# Patient Record
Sex: MALE | Race: ASIAN | NOT HISPANIC OR LATINO | Employment: FULL TIME | URBAN - METROPOLITAN AREA
[De-identification: names, ages, dates, MRNs, and addresses within clinical notes are randomized per-mention and may not be internally consistent; named-entity substitution may affect disease eponyms.]

---

## 2018-04-30 ENCOUNTER — OFFICE VISIT (OUTPATIENT)
Dept: FAMILY MEDICINE | Facility: CLINIC | Age: 28
End: 2018-04-30
Payer: COMMERCIAL

## 2018-04-30 VITALS
SYSTOLIC BLOOD PRESSURE: 94 MMHG | DIASTOLIC BLOOD PRESSURE: 62 MMHG | OXYGEN SATURATION: 100 % | BODY MASS INDEX: 27 KG/M2 | WEIGHT: 175 LBS | HEART RATE: 63 BPM | TEMPERATURE: 98.3 F

## 2018-04-30 DIAGNOSIS — J45.20 MILD INTERMITTENT ASTHMA WITHOUT COMPLICATION: ICD-10-CM

## 2018-04-30 DIAGNOSIS — F90.0 ATTENTION DEFICIT HYPERACTIVITY DISORDER (ADHD), PREDOMINANTLY INATTENTIVE TYPE: Primary | ICD-10-CM

## 2018-04-30 PROCEDURE — 99214 OFFICE O/P EST MOD 30 MIN: CPT | Performed by: FAMILY MEDICINE

## 2018-04-30 RX ORDER — DEXTROAMPHETAMINE SACCHARATE, AMPHETAMINE ASPARTATE, DEXTROAMPHETAMINE SULFATE AND AMPHETAMINE SULFATE 5; 5; 5; 5 MG/1; MG/1; MG/1; MG/1
20 TABLET ORAL 2 TIMES DAILY
Qty: 60 TABLET | Refills: 0 | Status: SHIPPED | OUTPATIENT
Start: 2018-05-30 | End: 2018-04-30

## 2018-04-30 RX ORDER — ALBUTEROL SULFATE 90 UG/1
1 AEROSOL, METERED RESPIRATORY (INHALATION) PRN
COMMUNITY
Start: 2017-10-20 | End: 2018-04-30

## 2018-04-30 RX ORDER — DEXTROAMPHETAMINE SACCHARATE, AMPHETAMINE ASPARTATE, DEXTROAMPHETAMINE SULFATE AND AMPHETAMINE SULFATE 5; 5; 5; 5 MG/1; MG/1; MG/1; MG/1
20 TABLET ORAL 2 TIMES DAILY
Qty: 60 TABLET | Refills: 0 | Status: SHIPPED | OUTPATIENT
Start: 2018-04-30 | End: 2018-04-30

## 2018-04-30 RX ORDER — DEXTROAMPHETAMINE SACCHARATE, AMPHETAMINE ASPARTATE, DEXTROAMPHETAMINE SULFATE AND AMPHETAMINE SULFATE 5; 5; 5; 5 MG/1; MG/1; MG/1; MG/1
20 TABLET ORAL 2 TIMES DAILY
Qty: 60 TABLET | Refills: 0 | Status: SHIPPED | OUTPATIENT
Start: 2018-06-30 | End: 2018-07-30

## 2018-04-30 RX ORDER — ALBUTEROL SULFATE 90 UG/1
1 AEROSOL, METERED RESPIRATORY (INHALATION) PRN
Qty: 18 G | Refills: 3 | Status: SHIPPED | OUTPATIENT
Start: 2018-04-30 | End: 2018-10-10

## 2018-04-30 NOTE — NURSING NOTE
"Chief Complaint   Patient presents with     Recheck Medication       Initial BP 94/62 (Cuff Size: Adult Large)  Pulse 63  Temp 98.3  F (36.8  C) (Tympanic)  Wt 175 lb (79.4 kg)  SpO2 100%  BMI 27 kg/m2 Estimated body mass index is 27 kg/(m^2) as calculated from the following:    Height as of 1/8/13: 5' 7.5\" (1.715 m).    Weight as of this encounter: 175 lb (79.4 kg).  Medication Reconciliation: complete   .Dee GARCIA      "

## 2018-04-30 NOTE — MR AVS SNAPSHOT
After Visit Summary   4/30/2018    Cinda Peterson    MRN: [id number]           Patient Information     Date Of Birth          1990        Visit Information        Provider Department      4/30/2018 4:30 PM Kavin Monaco MD [facility]        Today's Diagnoses     Attention deficit hyperactivity disorder (ADHD), predominantly inattentive type    -  1    Mild intermittent asthma without complication           Follow-ups after your visit        Who to contact     If you have questions or need follow up information about today's clinic visit or your schedule please contact [facility] directly at [phone].  Normal or non-critical lab and imaging results will be communicated to you by MyChart, letter or phone within 4 business days after the clinic has received the results. If you do not hear from us within 7 days, please contact the clinic through [employer] or phone. If you have a critical or abnormal lab result, we will notify you by phone as soon as possible.  Submit refill requests through [employer] or call your pharmacy and they will forward the refill request to us. Please allow 3 business days for your refill to be completed.          Additional Information About Your Visit        MyChart Information     [employer] gives you secure access to your electronic health record. If you see a primary care provider, you can also send messages to your care team and make appointments. If you have questions, please call your primary care clinic.  If you do not have a primary care provider, please call [phone] and they will assist you.        Care EveryWhere ID     This is your Care EveryWhere ID. This could be used by other organizations to access your [place] medical records  [id number]        Your Vitals Were     Pulse Temperature Pulse Oximetry BMI (Body Mass Index)          63 98.3  F (36.8  C) (Tympanic) 100% 27 kg/m2          Blood Pressure from Last 3 Encounters:   04/30/18 94/62   01/08/13 96/54   08/17/12 120/60    Weight from Last 3 Encounters:   04/30/18 175 lb (79.4 kg)   01/08/13 175 lb 6.4 oz (79.6 kg)   08/17/12 157 lb (71.2 kg)              Today, you had the following     No orders found for display         Today's Medication Changes          These changes are accurate as of 4/30/18  4:37 PM.  If you have any questions, ask your nurse or doctor.               Start taking these medicines.        Dose/Directions    amphetamine-dextroamphetamine 20 MG per tablet   Commonly known as:  ADDERALL   Used for:  Attention deficit hyperactivity disorder (ADHD), predominantly inattentive type   Started by:  Kavin Monaco MD        Dose:  20 mg   Start taking on:  6/30/2018   Take 1 tablet (20 mg) by mouth 2 times daily   Quantity:  60 tablet   Refills:  0            Where to get your medicines      These medications were sent to [facility] [place] [id number]  [address] [facility] [address]     Phone:  [phone]     albuterol 108 (90 Base) MCG/ACT Inhaler         Some of these will need a paper prescription and others can be bought over the counter.  Ask your nurse if you have questions.     Bring a paper prescription for each of these medications     amphetamine-dextroamphetamine 20 MG per tablet                Primary Care Provider Office Phone # Fax #    Brianna HAIRSTON Wali Spaulding [phone]       [facility] [address] [place] [address]        Equal Access to Services     [facility] AH: Hadii aad ku hadasho Soomaali, waaxda luqadaha, qaybta kaalmada adeegyada, norma reyes haybianca farr. So [facility] [phone].    ATENCIÓN: Si habla español, tiene a allen disposición servicios gratuitos de asistencia lingüística. Llame al [phone].    We comply with applicable federal civil rights laws and [place] laws. We do not  discriminate on the basis of race, color, national origin, age, disability, sex, sexual orientation, or gender identity.            Thank you!     Thank you for choosing [facility]  for your care. Our goal is always to provide you with excellent care. Hearing back from our patients is one way we can continue to improve our services. Please take a few minutes to complete the written survey that you may receive in the mail after your visit with us. Thank you!             Your Updated Medication List - Protect others around you: Learn how to safely use, store and throw away your medicines at www.disposemymeds.org.          This list is accurate as of 4/30/18  4:37 PM.  Always use your most recent med list.                   Brand Name Dispense Instructions for use Diagnosis    albuterol 108 (90 Base) MCG/ACT Inhaler    VENTOLIN HFA    18 g    Inhale 1 puff into the lungs as needed    Mild intermittent asthma without complication       amphetamine-dextroamphetamine 20 MG per tablet   Start taking on:  6/30/2018    ADDERALL    60 tablet    Take 1 tablet (20 mg) by mouth 2 times daily    Attention deficit hyperactivity disorder (ADHD), predominantly inattentive type       fish oil-omega-3 fatty acids 1000 MG capsule      Take 3 g by mouth daily.        vitamin D 2000 units Caps      Take  by mouth 2 times daily.

## 2018-04-30 NOTE — PROGRESS NOTES
SUBJECTIVE:   Cinda Peterson is a 27 year old male who presents to clinic today for the following health issues:    Pt had been living in [place], [place] working for [employer].  He moved back to [place] in March now working for  [employer]     Medication Followup of adderall    Taking Medication as prescribed: yes    Side Effects:  None    Medication Helping Symptoms:  yes   Pt has been taking Adderall 20 mg twice daily, works well for him without side effects        Attention Deficit Disorder      Duration: since teenager     Description (location/character/radiation): primarily inattentive type    Intensity:  moderate    Accompanying signs and symptoms: hard to keep on track, focus     History (similar episodes/previous evaluation): has been ongoing    Precipitating or alleviating factors: None    Therapies tried and outcome: Adderall 20 mg BID works well     Asthma Follow-Up    Was ACT completed today?  No      Respiratory symptoms:   Cough: No   Wheezing: Yes-  intermittent   Shortness of breath: No    Use of short- acting(rescue) inhaler: Albuterol    Taking controlled (daily) meds as prescribed: No    ER/UC visits or hospital admissions since last visit: none     Recent asthma triggers that patient is dealing with: animal dander, found out that previous renter had a cat.  Pt has Hx of allergy to cats       Problem list and histories reviewed & adjusted, as indicated.  Additional history: as documented    Labs reviewed in EPIC    Reviewed and updated as needed this visit by clinical staff  Tobacco  Allergies  Meds  Problems  Med Hx  Surg Hx  Fam Hx  Soc Hx        Reviewed and updated as needed this visit by Provider  Allergies  Meds  Problems         ROS:  CONSTITUTIONAL:NEGATIVE for fever, chills, change in weight  ENT/MOUTH: NEGATIVE for ear, mouth and throat problems  RESP:POSITIVE for Hx asthma and wheezing  PSYCHIATRIC: NEGATIVE for changes in mood or affect and  POSITIVE forconcentration difficulty    OBJECTIVE:                                                    BP 94/62 (Cuff Size: Adult Large)  Pulse 63  Temp 98.3  F (36.8  C) (Tympanic)  Wt 175 lb (79.4 kg)  SpO2 100%  BMI 27 kg/m2  Body mass index is 27 kg/(m^2).  GENERAL APPEARANCE: healthy, alert and no distress  HENT: ear canals and TM's normal and nose and mouth without ulcers or lesions  RESP: lungs clear to auscultation - no rales, rhonchi or wheezes  CV: regular rates and rhythm, normal S1 S2, no S3 or S4 and no murmur, click or rub  PSYCH: mentation appears normal and affect normal/bright    Diagnostic test results:  none      ASSESSMENT/PLAN:                                                        ICD-10-CM    1. Attention deficit hyperactivity disorder (ADHD), predominantly inattentive type F90.0 amphetamine-dextroamphetamine (ADDERALL) 20 MG per tablet     DISCONTINUED: amphetamine-dextroamphetamine (ADDERALL) 20 MG per tablet     DISCONTINUED: amphetamine-dextroamphetamine (ADDERALL) 20 MG per tablet   2. Mild intermittent asthma without complication J45.20 albuterol (VENTOLIN HFA) 108 (90 Base) MCG/ACT Inhaler       Follow up with Provider - 3 mo   Rx for 3 sequential months of Adderall given to Pt.  1st month dated today, 2nd dated 5/30/18 and the 3rd dated 6/30/18     Patient Instructions   Advised to have apartment professionally cleaned to help get rid of the cat dander and hair      Kavin Monaco MD  [facility]

## 2018-05-03 ENCOUNTER — TELEPHONE (OUTPATIENT)
Dept: FAMILY MEDICINE | Facility: CLINIC | Age: 28
End: 2018-05-03

## 2018-05-03 NOTE — TELEPHONE ENCOUNTER
Patient called today.    Patient needs a prior authorization on medication Adderall.    Please contact patient.    Thank you.    Central Scheduling  Anna RAHMAN

## 2018-05-03 NOTE — TELEPHONE ENCOUNTER
Central Prior Authorization Team   Phone: [phone]    PA Initiation    Medication: Amphetamine-dextroamphetamine 20 mg  Insurance Company: [employer] - Phone [phone] Fax [phone]  Pharmacy Filling the Rx: [place], [place] - [address]  Filling Pharmacy Phone: [phone]  Filling Pharmacy Fax:    Start Date: 5/3/2018

## 2018-05-03 NOTE — TELEPHONE ENCOUNTER
Prior Authorization Retail Medication Request    Medication/Dose: Amphetamine-dextroamphetamine 20 mg  ICD code (if different than what is on RX):    Previously Tried and Failed:    Rationale:      Insurance Name:  Preferred [employer]clear [employer]  Insurance ID:  [id number]     Pharmacy Information (if different than what is on RX)  Name:    Phone:

## 2018-05-04 NOTE — TELEPHONE ENCOUNTER
Central Prior Authorization Team   Phone: [phone]    Answered additional questions for insurance and faxed back.

## 2018-05-07 NOTE — TELEPHONE ENCOUNTER
Prior Authorization Approval    Authorization Effective Date:  05/04/2018   Authorization Expiration Date:  05/04/2019  Medication: Amphetamine-dextroamphetamine 20 mg  Approved Dose/Quantity:    Reference #:     Insurance Company: [employer] - Phone [phone] Fax [phone]  Expected CoPay:       CoPay Card Available:      Foundation Assistance Needed:    Which Pharmacy is filling the prescription (Not needed for infusion/clinic administered): [facility] [place], [place] - [address]  Pharmacy Notified: Yes  Patient Notified: Yes

## 2018-05-23 ENCOUNTER — TELEPHONE (OUTPATIENT)
Dept: FAMILY MEDICINE | Facility: CLINIC | Age: 28
End: 2018-05-23

## 2018-05-23 NOTE — TELEPHONE ENCOUNTER
Panel Management Review      Patient has the following on his problem list:   Asthma review   No flowsheet data found.   1. Is Asthma diagnosis on the Problem List? Yes    2. Is Asthma listed on Health Maintenance? Yes    3. Patient is due for:  ACT and AAP      Composite cancer screening  Chart review shows that this patient is due/due soon for the following None  Summary:    Patient is due/failing the following:   AAP and ACT    Action needed:   Patient needs office visit for asthma.    Type of outreach:    Sent letter.    Questions for provider review:    None                                                                                                                                    Ellie Gan CMA

## 2018-05-23 NOTE — LETTER
May 23, 2018    Cinda Donaldthipiirene  [address]  [facility] [address]    Dear Koko BLISS cares about your health and your health plan.  I have reviewed your medical conditions, medication list and lab results, and am making recommendations based on this review to better manage your health.    You are in particular need of attention regarding:  -Asthma    I am recommending that you:     -Complete and return the attached ASTHMA CONTROL TEST.  If your total score is 19 or less or you have been to the ER or urgent care for your asthma, then please schedule an asthma followup appointment.      Please call us at the [facility] location:  [phone] or use [employer] to address the above recommendations.     Thank you for trusting [facility].  We appreciate the opportunity to serve you and look forward to supporting your healthcare in the future.    If you have (or plan to have) any of these tests done at a facility other than a [facility] or a [facility], please have the results sent to the [facility] location noted above.      Best Regards,    Kavin Monaco MD

## 2018-05-29 ENCOUNTER — OFFICE VISIT (OUTPATIENT)
Dept: PSYCHIATRY | Facility: CLINIC | Age: 28
End: 2018-05-29
Attending: PSYCHIATRY & NEUROLOGY
Payer: COMMERCIAL

## 2018-05-29 DIAGNOSIS — F90.0 ATTENTION DEFICIT HYPERACTIVITY DISORDER (ADHD), PREDOMINANTLY INATTENTIVE TYPE: Primary | ICD-10-CM

## 2018-05-29 NOTE — PROGRESS NOTES
"[facility]  Therapy Progress Note    Client Name: Cinda Peterson (RUTHY) : 1990  Date:18     Diagnostic Codes: ADHD, predominantly inattentive type F90.0  Type of Session: Individual psychotherapy  Length of Session:  60 minutes  Practitioner: Abiel Turner MD  Supervisor: Tish Holland LP PsyD    The patient was seen by this writer. Met with patient, who prefers to be called TK.  The limits of confidentiality were reviewed at the onset of the session. Discussed various issues that TK feels he needs help with. He has recently moved back to the [place] and has been working with [employer] for 2 months now. He has previously tried therapy in the form of counseling when he was doing his college degree in [place]. He felt he benefited from that therapy and he was with his counselor for two years.     He identified two ongoing issues for him. One is related to his family and his sister. He is having trouble communicating effectively with his family because he feels they don't understand what he is going through. He describes them as \"conservative\" and not adapting to the culture around them. They don't seem to understand what TK is going through and are not fully comprehending the choices he makes. He also struggles with them because he feels his sister is suffering from a mental illness and needs to get help. However, due to stigma and lack of understanding from their end, they are not accepting that and his sister is not getting the help she needs. TK would like explore ways he can communicate effectively with his parents and bridge the gap between them.     The second issue is pertaining to relationships. He describes himself as not able to maintain close friendships. He feels that is partly related to him growing up with his family who \"isolated\" him. They wouldn't let him participate in groups and they didn't encourage him to have friends. The first time he made some friends was in high " "school. He didn't identify close friends in college besides some roommates. Now, that he is back in the [facility], he is living with his highschool friends. He feels that he is not able to connect with them. He describes them as still \"stuck in the highschool phase\". He is seeking relationships and friendships that would stimulate his brain. He feels he is \"grown up\" and they haven't. He is also interested in an romantic relationship. He has not had one before.     As for his mental health, he is being followed by his PCP for treatment of ADHD. He is tolerating the medications, with no side effects, and describes them as clinically effective to him. He was diagnosed with depression during college years, has tried therapy as an intervention. Currently he denies depressive symptoms. No SI. He reported battling with anxiety when he was younger, mostly around talking to people. However, this is not an issue for him now. He is able to talk to people without trouble. In fact, he has been going to meet up groups to socialize and mingle with people. He also has a date planned with a girl he asked out and met at the meet up groups. He is looking forward to that date and denies anxiety surrounding it. Denies current ongoing concerns.     Past therapy techniques were reviewed.Different kinds of therapies were discussed. TK agrees that supportive psychotherapy would work best for him. He prefers a therapist and not a trainee because he wants continuity of care. He is looking for a therapist who would be a male and would have similar diverse background.    Therapy goals: Develop healthy interpersonal relationships that lead to the alleviation and prevention of depression and anxiety symptoms, show evidence of increased motivation, and develop healthy cognitive patterns and beliefs, elevate mood and show evidence of usual energy levels, activities, and socialization level    Assessment:   Appearance:  No apparent distress, " "Formally dressed, Well groomed and Appears stated age  Behavior/relationship to examiner/demeanor:  Cooperative and Pleasant  Motor activity/EPS:  Normal  Gait:  Normal  Speech rate:  Normal  Speech volume:  Normal  Speech articulation:  Normal  Speech coherence:  Normal  Speech spontaneity:  Normal  Mood (subjective report):  \"good\"  Affect (objective appearance):  Appropriate/mood-congruent  Thought Process (Associations):  Logical, Linear and Goal directed  Thought process (Rate):  Normal  Thought content:  no evidence of suicidal or homicidal ideation and no overt psychosis  Abnormal Perception:  None  Insight:  Good  Judgment:  Good      Plan:     - Social Work Referral- patient looking for a therapist with similar background and gender  - Referral to therapist in the community. Patient would benefit from supportive psychotherapy     Abiel Lord MD  PGY 2 Resident     I did not see this pt directly. This pt is discussed with me in individual psychotherapy supervision, and I agree with the plan as documented. Tish Holland Psy.D. , L.P.        "

## 2018-05-29 NOTE — MR AVS SNAPSHOT
After Visit Summary   5/29/2018    Cinda Peterson    MRN: [id number]           Patient Information     Date Of Birth          1990        Visit Information        Provider Department      5/29/2018 4:00 PM Abiel Lord MD [facility]        Today's Diagnoses     Attention deficit hyperactivity disorder (ADHD), predominantly inattentive type    -  1       Follow-ups after your visit        Who to contact     Please call your clinic at [phone] to:    Ask questions about your health    Make or cancel appointments    Discuss your medicines    Learn about your test results    Speak to your doctor            Additional Information About Your Visit        MyChart Information     [employer] gives you secure access to your electronic health record. If you see a primary care provider, you can also send messages to your care team and make appointments. If you have questions, please call your primary care clinic.  If you do not have a primary care provider, please call [phone] and they will assist you.      [facility] is an electronic gateway that provides easy, online access to your medical records. With [facility], you can request a clinic appointment, read your test results, renew a prescription or communicate with your care team.     To access your existing account, please contact your [facility] or call [phone] for assistance.        Care EveryWhere ID     This is your Care EveryWhere ID. This could be used by other organizations to access your [place] medical records  [id number]         Blood Pressure from Last 3 Encounters:   04/30/18 94/62   01/08/13 96/54   08/17/12 120/60    Weight from Last 3 Encounters:   04/30/18 79.4 kg (175 lb)   01/08/13 79.6 kg (175 lb 6.4 oz)   08/17/12 71.2 kg (157 lb)              Today, you had the following     No orders found for display       Primary Care Provider Office Phone # Fax #    Brianna Keyes  MD [place] [phone]       [facility] [address] [facility] [address]        Equal Access to Services     [facility] CLEMENT[facility] : Hadii aad ku danielo Socelsoali, waaxda luqadaha, qaybta kaalmada adeegyada, norma coello laAjitbianca farr. So [facility] [phone].    ATENCIÓN: Si habla español, tiene a allen disposición servicios gratuitos de asistencia lingüística. Llame al [phone].    We comply with applicable federal civil rights laws and [place] laws. We do not discriminate on the basis of race, color, national origin, age, disability, sex, sexual orientation, or gender identity.            Thank you!     Thank you for choosing [facility]  for your care. Our goal is always to provide you with excellent care. Hearing back from our patients is one way we can continue to improve our services. Please take a few minutes to complete the written survey that you may receive in the mail after your visit with us. Thank you!             Your Updated Medication List - Protect others around you: Learn how to safely use, store and throw away your medicines at www.disposemymeds.org.          This list is accurate as of 5/29/18 11:59 PM.  Always use your most recent med list.                   Brand Name Dispense Instructions for use Diagnosis    albuterol 108 (90 Base) MCG/ACT Inhaler    VENTOLIN HFA    18 g    Inhale 1 puff into the lungs as needed    Mild intermittent asthma without complication       amphetamine-dextroamphetamine 20 MG per tablet   Start taking on:  6/30/2018    ADDERALL    60 tablet    Take 1 tablet (20 mg) by mouth 2 times daily    Attention deficit hyperactivity disorder (ADHD), predominantly inattentive type       cholecalciferol 1000 UNIT tablet    vitamin D3     Take 1,000 Units by mouth daily        OMEGA-3 PLUS 1000 MG Caps      Take 1,000 mg by mouth daily

## 2018-06-05 ENCOUNTER — TELEPHONE (OUTPATIENT)
Dept: FAMILY MEDICINE | Facility: CLINIC | Age: 28
End: 2018-06-05

## 2018-06-05 NOTE — TELEPHONE ENCOUNTER
PA Initiation    Medication: amphetamine-dextroamphetamine (ADDERALL) 20 MG per tablet  Insurance Company: [employer] - Phone [phone] Fax [phone]  Pharmacy Filling the Rx: [facility] [id number] [place] [address] AT [facility]  Filling Pharmacy Phone: [phone]  Filling Pharmacy Fax:    Start Date: 6/5/2018    THIS HAS BEEN SUBMITTED BY THE PRIOR-AUTHORIZATION TEAM. ANY QUESTIONS PLEASE CALL [phone]. THANK YOU

## 2018-06-07 NOTE — TELEPHONE ENCOUNTER
Prior Authorization Not Needed per Insurance    Medication: amphetamine-dextroamphetamine (ADDERALL) 20 MG per tablet  Insurance Company: [employer] - Phone [phone] Fax [phone]  Expected CoPay:      Pharmacy Filling the Rx: [facility] [address] [place] [address] [facility]  Pharmacy Notified: Yes  Patient Notified: Yes    Please see telephone note from 5/3/2018.  This medication is already approved for 1 year until 5/4/19.

## 2018-06-20 ENCOUNTER — OFFICE VISIT (OUTPATIENT)
Dept: FAMILY MEDICINE | Facility: CLINIC | Age: 28
End: 2018-06-20
Payer: COMMERCIAL

## 2018-06-20 VITALS
HEART RATE: 93 BPM | BODY MASS INDEX: 27.36 KG/M2 | HEIGHT: 66 IN | TEMPERATURE: 97.3 F | OXYGEN SATURATION: 100 % | RESPIRATION RATE: 18 BRPM | WEIGHT: 170.25 LBS

## 2018-06-20 DIAGNOSIS — J20.9 ACUTE BRONCHITIS WITH COEXISTING CONDITION REQUIRING PROPHYLACTIC TREATMENT: Primary | ICD-10-CM

## 2018-06-20 PROCEDURE — 99213 OFFICE O/P EST LOW 20 MIN: CPT | Performed by: FAMILY MEDICINE

## 2018-06-20 RX ORDER — AZITHROMYCIN 250 MG/1
TABLET, FILM COATED ORAL
Qty: 6 TABLET | Refills: 0 | Status: SHIPPED | OUTPATIENT
Start: 2018-06-20 | End: 2018-10-10

## 2018-06-20 RX ORDER — ALBUTEROL SULFATE 90 UG/1
2 AEROSOL, METERED RESPIRATORY (INHALATION) EVERY 6 HOURS PRN
Qty: 1 INHALER | Refills: 0 | Status: SHIPPED | OUTPATIENT
Start: 2018-06-20 | End: 2018-10-10

## 2018-06-20 NOTE — MR AVS SNAPSHOT
After Visit Summary   6/20/2018    Cinda Peterson    MRN: [id number]           Patient Information     Date Of Birth          1990        Visit Information        Provider Department      6/20/2018 4:45 PM Benedict Dye MD [facility]        Today's Diagnoses     Acute bronchitis with coexisting condition requiring prophylactic treatment    -  1      Care Instructions    Given your exam and symptoms today, I do recommend azithromcyin - 2 pills day one then 1 pill by mouth daily for 4 more days.  Herbal (mint or ginger tea) with lemon and honey.  Tylenol up to 1000mg every 8 hours or Ibuprofen up to 800mg every 8 hrs as needed for fever and aches.  Continue albuterol as needed.    If not better or if worsening, send message Friday AM - I would add prednisone.          Follow-ups after your visit        Who to contact     If you have questions or need follow up information about today's clinic visit or your schedule please contact [facility] directly at [phone].  Normal or non-critical lab and imaging results will be communicated to you by Elixenthart, letter or phone within 4 business days after the clinic has received the results. If you do not hear from us within 7 days, please contact the clinic through BlackStratust or phone. If you have a critical or abnormal lab result, we will notify you by phone as soon as possible.  Submit refill requests through Channel Breeze or call your pharmacy and they will forward the refill request to us. Please allow 3 business days for your refill to be completed.          Additional Information About Your Visit        Elixenthart Information     Channel Breeze gives you secure access to your electronic health record. If you see a primary care provider, you can also send messages to your care team and make appointments. If you have questions, please call your primary care clinic.  If you do not have a primary care  "provider, please call [phone] and they will assist you.        Care EveryWhere ID     This is your Care EveryWhere ID. This could be used by other organizations to access your [place] medical records  [id number]        Your Vitals Were     Pulse Temperature Respirations Height Pulse Oximetry BMI (Body Mass Index)    93 97.3  F (36.3  C) (Oral) 18 5' 6\" (1.676 m) 100% 27.48 kg/m2       Blood Pressure from Last 3 Encounters:   04/30/18 94/62   01/08/13 96/54   08/17/12 120/60    Weight from Last 3 Encounters:   06/20/18 170 lb 4 oz (77.2 kg)   04/30/18 175 lb (79.4 kg)   01/08/13 175 lb 6.4 oz (79.6 kg)              Today, you had the following     No orders found for display         Today's Medication Changes          These changes are accurate as of 6/20/18  5:45 PM.  If you have any questions, ask your nurse or doctor.               Start taking these medicines.        Dose/Directions    azithromycin 250 MG tablet   Commonly known as:  ZITHROMAX   Used for:  Acute bronchitis with coexisting condition requiring prophylactic treatment   Started by:  Benedict Dye MD        Two tablets first day, then one tablet daily for four days.   Quantity:  6 tablet   Refills:  0         These medicines have changed or have updated prescriptions.        Dose/Directions    * albuterol 108 (90 Base) MCG/ACT Inhaler   Commonly known as:  VENTOLIN HFA   This may have changed:  Another medication with the same name was added. Make sure you understand how and when to take each.   Used for:  Mild intermittent asthma without complication   Changed by:  Benedict Dye MD        Dose:  1 puff   Inhale 1 puff into the lungs as needed   Quantity:  18 g   Refills:  3       * albuterol 108 (90 Base) MCG/ACT Inhaler   Commonly known as:  PROAIR HFA/PROVENTIL HFA/VENTOLIN HFA   This may have changed:  You were already taking a medication with the same name, and this prescription was added. Make sure you " understand how and when to take each.   Used for:  Acute bronchitis with coexisting condition requiring prophylactic treatment   Changed by:  Benedict Dye MD        Dose:  2 puff   Inhale 2 puffs into the lungs every 6 hours as needed for shortness of breath / dyspnea or wheezing   Quantity:  1 Inhaler   Refills:  0       * Notice:  This list has 2 medication(s) that are the same as other medications prescribed for you. Read the directions carefully, and ask your doctor or other care provider to review them with you.         Where to get your medicines      These medications were sent to [facility] [address] AT [address] [address]     Phone:  [phone]     albuterol 108 (90 Base) MCG/ACT Inhaler    azithromycin 250 MG tablet                Primary Care Provider Office Phone # Fax #    Brianna MARIKA Wali Spaulding [phone]       [facility] [address] [place] [address]        Equal Access to Services     [facility]: Hadii aad ku hadasho Soomaali, waaxda luqadaha, qaybta kaalmada adeegyada, waxay amy haybianca caraballo . So [facility] [phone].    ATENCIÓN: Si habla español, tiene a allen disposición servicios gratuitos de asistencia lingüística. [facility] [phone].    We comply with applicable federal civil rights laws and [place] laws. We do not discriminate on the basis of race, color, national origin, age, disability, sex, sexual orientation, or gender identity.            Thank you!     Thank you for choosing [facility]  for your care. Our goal is always to provide you with excellent care. Hearing back from our patients is one way we can continue to improve our services. Please take a few minutes to complete the written survey that you may receive in the mail after your visit with us. Thank you!             Your Updated  Medication List - Protect others around you: Learn how to safely use, store and throw away your medicines at www.disposemymeds.org.          This list is accurate as of 6/20/18  5:45 PM.  Always use your most recent med list.                   Brand Name Dispense Instructions for use Diagnosis    * albuterol 108 (90 Base) MCG/ACT Inhaler    VENTOLIN HFA    18 g    Inhale 1 puff into the lungs as needed    Mild intermittent asthma without complication       * albuterol 108 (90 Base) MCG/ACT Inhaler    PROAIR HFA/PROVENTIL HFA/VENTOLIN HFA    1 Inhaler    Inhale 2 puffs into the lungs every 6 hours as needed for shortness of breath / dyspnea or wheezing    Acute bronchitis with coexisting condition requiring prophylactic treatment       amphetamine-dextroamphetamine 20 MG per tablet   Start taking on:  6/30/2018    ADDERALL    60 tablet    Take 1 tablet (20 mg) by mouth 2 times daily    Attention deficit hyperactivity disorder (ADHD), predominantly inattentive type       azithromycin 250 MG tablet    ZITHROMAX    6 tablet    Two tablets first day, then one tablet daily for four days.    Acute bronchitis with coexisting condition requiring prophylactic treatment       cholecalciferol 1000 UNIT tablet    vitamin D3     Take 1,000 Units by mouth daily        OMEGA-3 PLUS 1000 MG Caps      Take 1,000 mg by mouth daily        * Notice:  This list has 2 medication(s) that are the same as other medications prescribed for you. Read the directions carefully, and ask your doctor or other care provider to review them with you.

## 2018-06-20 NOTE — PROGRESS NOTES
"  SUBJECTIVE:   Cinda Peterson is a 28 year old male who presents to clinic today for the following health issues:      RESPIRATORY SYMPTOMS      Duration: started last Wednesday. Pt was bed-ridden Friday and Saturday: couldn't eat    Description   cough, fatigue, dizziness, fogginess, green and yellow phelm, fever, started off as a sore sore throat     Severity: severe    Accompanying signs and symptoms: body aches     History (predisposing factors): none    Precipitating or alleviating factors: None    Therapies tried and outcome:  Vitamin C and Nyquil     He does live with a friend who used to have a cat - moved their three months ago.    These symptoms started last week.     He does take antihistamine. He also has tried albuterol and it does help.    Does use albuterol and allegra for asthma/allergies.      ROS  Feels weak, usually has some more energy.  No longer feels sore throat  No longer feels feverish  No ear pain  Doesn't feel tight chest or shortness of breath.    EXAM:  Pulse 93  Temp 97.3  F (36.3  C) (Oral)  Resp 18  Ht 5' 6\" (1.676 m)  Wt 170 lb 4 oz (77.2 kg)  SpO2 100%  BMI 27.48 kg/m2  Constitutional: Healthy, alert, no distress   EENT: PERRL, TM's clear bilaterally, oropharynx with minimal erythema, no anterior cervical lymphadenopathy   Cardiovascular: RRR. No murmurs   Respiratory: crackles and wheeze in left lower lung posteriorly    ASSESSMENT    ICD-10-CM    1. Acute bronchitis with coexisting condition requiring prophylactic treatment J20.9 azithromycin (ZITHROMAX) 250 MG tablet     albuterol (PROAIR HFA/PROVENTIL HFA/VENTOLIN HFA) 108 (90 Base) MCG/ACT Inhaler      Plan:  Patient Instructions   Given your exam and symptoms today, I do recommend azithromcyin - 2 pills day one then 1 pill by mouth daily for 4 more days.  Herbal (mint or ginger tea) with lemon and honey.  Tylenol up to 1000mg every 8 hours or Ibuprofen up to 800mg every 8 hrs as needed for fever and " aches.  Continue albuterol as needed.    If not better or if worsening, send message Friday AM - I would add prednisone.     Benedict Baltazar MD  Family Medicine Physician

## 2018-06-20 NOTE — PATIENT INSTRUCTIONS
Given your exam and symptoms today, I do recommend azithromcyin - 2 pills day one then 1 pill by mouth daily for 4 more days.  Herbal (mint or ginger tea) with lemon and honey.  Tylenol up to 1000mg every 8 hours or Ibuprofen up to 800mg every 8 hrs as needed for fever and aches.  Continue albuterol as needed.    If not better or if worsening, send message Friday AM - I would add prednisone.

## 2018-06-21 ASSESSMENT — ASTHMA QUESTIONNAIRES: ACT_TOTALSCORE: 20

## 2018-06-22 ENCOUNTER — MYC MEDICAL ADVICE (OUTPATIENT)
Dept: FAMILY MEDICINE | Facility: CLINIC | Age: 28
End: 2018-06-22

## 2018-06-22 DIAGNOSIS — J20.9 ACUTE BRONCHITIS WITH COEXISTING CONDITION REQUIRING PROPHYLACTIC TREATMENT: Primary | ICD-10-CM

## 2018-06-22 RX ORDER — PREDNISONE 20 MG/1
TABLET ORAL
Qty: 7 TABLET | Refills: 0 | Status: SHIPPED | OUTPATIENT
Start: 2018-06-22 | End: 2018-10-10

## 2018-06-22 RX ORDER — PREDNISONE 20 MG/1
TABLET ORAL
Qty: 5 TABLET | Refills: 0 | Status: SHIPPED | OUTPATIENT
Start: 2018-06-22 | End: 2018-06-22

## 2018-06-22 NOTE — TELEPHONE ENCOUNTER
Script rewritten with correction of quantity. Spoke with patient. Felt worse this morning than he does now. Recommended waiting to start medication until tomorrow if needed.    Benedict Baltazar MD  Family Medicine Physician

## 2018-07-30 ENCOUNTER — MYC MEDICAL ADVICE (OUTPATIENT)
Dept: FAMILY MEDICINE | Facility: CLINIC | Age: 28
End: 2018-07-30

## 2018-07-30 DIAGNOSIS — F90.0 ATTENTION DEFICIT HYPERACTIVITY DISORDER (ADHD), PREDOMINANTLY INATTENTIVE TYPE: ICD-10-CM

## 2018-07-30 RX ORDER — DEXTROAMPHETAMINE SACCHARATE, AMPHETAMINE ASPARTATE, DEXTROAMPHETAMINE SULFATE AND AMPHETAMINE SULFATE 5; 5; 5; 5 MG/1; MG/1; MG/1; MG/1
20 TABLET ORAL 2 TIMES DAILY
Qty: 60 TABLET | Refills: 0 | Status: SHIPPED | OUTPATIENT
Start: 2018-07-30 | End: 2018-09-06

## 2018-07-30 NOTE — TELEPHONE ENCOUNTER
Controlled Substance Refill Request for amphetamine-dextroamphetamine (ADDERALL) 20 MG per tablet  Problem List Complete:  No     PROVIDER TO CONSIDER COMPLETION OF PROBLEM LIST AND OVERVIEW/CONTROLLED SUBSTANCE AGREEMENT        Controlled substance agreement on file: No.     Processing:  Patient will  in clinic   checked in past 3 months?  Yes 07/30/2018 no concerns Rx was last filled 07/06/2018 #60 tablets.

## 2018-07-30 NOTE — TELEPHONE ENCOUNTER
Message left on voice mail that script was ready for  at our [facility] location.    Oumou Pedersen LPN

## 2018-07-30 NOTE — TELEPHONE ENCOUNTER
Requested Prescriptions   Pending Prescriptions Disp Refills     amphetamine-dextroamphetamine (ADDERALL) 20 MG per tablet      Last Written Prescription Date:  4/30/18  Last Fill Quantity: 60,   # refills: 0  Last Office Visit: 4/30/18 [facility]  Future Office visit:       Routing refill request to provider for review/approval because:  Drug not on the [facility], [facility] or [facility] refill protocol or controlled substance   60 tablet 0     Sig: Take 1 tablet (20 mg) by mouth 2 times daily    There is no refill protocol information for this order

## 2018-09-06 ENCOUNTER — MYC MEDICAL ADVICE (OUTPATIENT)
Dept: FAMILY MEDICINE | Facility: CLINIC | Age: 28
End: 2018-09-06

## 2018-09-06 DIAGNOSIS — F90.0 ATTENTION DEFICIT HYPERACTIVITY DISORDER (ADHD), PREDOMINANTLY INATTENTIVE TYPE: ICD-10-CM

## 2018-09-06 RX ORDER — DEXTROAMPHETAMINE SACCHARATE, AMPHETAMINE ASPARTATE, DEXTROAMPHETAMINE SULFATE AND AMPHETAMINE SULFATE 5; 5; 5; 5 MG/1; MG/1; MG/1; MG/1
20 TABLET ORAL 2 TIMES DAILY
Qty: 60 TABLET | Refills: 0 | Status: SHIPPED | OUTPATIENT
Start: 2018-09-06 | End: 2018-10-19

## 2018-09-06 NOTE — TELEPHONE ENCOUNTER
Amphetamine-dextroamphetamine (ADDERALL) 20 MG     Last Written Prescription Date:  7/30/18  Last Fill Quantity: 60,   # refills: 0  Last Office Visit:6/20/18  Future Office visit:       Routing refill request to provider for review/approval because:  Drug not on the [facility], [facility] or [facility] refill protocol or controlled substance  Controlled Substance Refill Request for Amphetamine-dextroamphetamine(ADDERALL) 20 MG Problem List Complete:  Yes   checked in past 3 months?  Yes 9/6/18 NO CONCERNS

## 2018-10-10 ENCOUNTER — OFFICE VISIT (OUTPATIENT)
Dept: FAMILY MEDICINE | Facility: CLINIC | Age: 28
End: 2018-10-10
Payer: COMMERCIAL

## 2018-10-10 VITALS
TEMPERATURE: 98.2 F | SYSTOLIC BLOOD PRESSURE: 118 MMHG | HEIGHT: 68 IN | WEIGHT: 174 LBS | OXYGEN SATURATION: 99 % | BODY MASS INDEX: 26.37 KG/M2 | HEART RATE: 60 BPM | DIASTOLIC BLOOD PRESSURE: 62 MMHG | RESPIRATION RATE: 14 BRPM

## 2018-10-10 DIAGNOSIS — J20.9 ACUTE BRONCHITIS, UNSPECIFIED ORGANISM: Primary | ICD-10-CM

## 2018-10-10 PROCEDURE — 99213 OFFICE O/P EST LOW 20 MIN: CPT | Performed by: FAMILY MEDICINE

## 2018-10-10 RX ORDER — ALBUTEROL SULFATE 90 UG/1
2 AEROSOL, METERED RESPIRATORY (INHALATION) 4 TIMES DAILY
Qty: 18 G | Refills: 3 | Status: SHIPPED | OUTPATIENT
Start: 2018-10-10 | End: 2019-02-28

## 2018-10-10 NOTE — PATIENT INSTRUCTIONS
Guaifenisen 400-600mg daily in the morning to loosen secretions.   Herbal (mint or ginger tea) with lemon and honey.  Albuterol 2 puffs every 4 hours for 3 days then as needed.  Vitamin C and Zinc which is naturally occurring in orange juice, but also present in products such as Emergen C or Airborne.  Loratadine 10mg daily.  Tylenol up to 1000mg every 8 hours or Ibuprofen up to 800mg daily as needed for fever and aches.   Rest.    If not better by 10/15/18, call or my chart - that is the timeline I'd expect you to be healed - if not would start azithromycin.

## 2018-10-10 NOTE — MR AVS SNAPSHOT
After Visit Summary   10/10/2018    Cinda Peterson    MRN: [id number]           Patient Information     Date Of Birth          1990        Visit Information        Provider Department      10/10/2018 6:00 PM Benedict Dye MD [facility]        Today's Diagnoses     Mild intermittent asthma without complication          Care Instructions    Guaifenisen 400-600mg daily in the morning to loosen secretions.   Herbal (mint or ginger tea) with lemon and honey.  Albuterol 2 puffs every 4 hours for 3 days then as needed.  Vitamin C and Zinc which is naturally occurring in orange juice, but also present in products such as Emergen C or Airborne.  Loratadine 10mg daily.  Tylenol up to 1000mg every 8 hours or Ibuprofen up to 800mg daily as needed for fever and aches.   Rest.    If not better by 10/15/18, call or my chart - that is the timeline I'd expect you to be healed - if not would start azithromycin.          Follow-ups after your visit        Who to contact     If you have questions or need follow up information about today's clinic visit or your schedule please contact [facility] directly at [phone].  Normal or non-critical lab and imaging results will be communicated to you by MyChart, letter or phone within 4 business days after the clinic has received the results. If you do not hear from us within 7 days, please contact the clinic through [facility] or phone. If you have a critical or abnormal lab result, we will notify you by phone as soon as possible.  Submit refill requests through Handup or call your pharmacy and they will forward the refill request to us. Please allow 3 business days for your refill to be completed.          Additional Information About Your Visit        [facility] Information     Handup gives you secure access to your electronic health record. If you see a primary care provider, you can also send messages to  "your care team and make appointments. If you have questions, please call your primary care clinic.  If you do not have a primary care provider, please call [phone] and they will assist you.        Care EveryWhere ID     This is your Care EveryWhere ID. This could be used by other organizations to access your [place] medical records  [id number]        Your Vitals Were     Pulse Temperature Respirations Height Pulse Oximetry BMI (Body Mass Index)    60 98.2  F (36.8  C) (Oral) 14 5' 7.5\" (1.715 m) 99% 26.85 kg/m2       Blood Pressure from Last 3 Encounters:   10/10/18 118/62   04/30/18 94/62   01/08/13 96/54    Weight from Last 3 Encounters:   10/10/18 174 lb (78.9 kg)   06/20/18 170 lb 4 oz (77.2 kg)   04/30/18 175 lb (79.4 kg)              Today, you had the following     No orders found for display         Today's Medication Changes          These changes are accurate as of 10/10/18  6:47 PM.  If you have any questions, ask your nurse or doctor.               These medicines have changed or have updated prescriptions.        Dose/Directions    albuterol 108 (90 Base) MCG/ACT inhaler   Commonly known as:  VENTOLIN HFA   This may have changed:    - how much to take  - when to take this  - reasons to take this   Used for:  Mild intermittent asthma without complication   Changed by:  Benedict Dye MD        Dose:  2 puff   Inhale 2 puffs into the lungs 4 times daily   Quantity:  18 g   Refills:  3            Where to get your medicines      These medications were sent to [facility] [id number] [place] [address] AT [address] [address]     Phone:  [phone]     albuterol 108 (90 Base) MCG/ACT inhaler                Primary Care Provider Office Phone # Fax #    [facility] [phone] [address] [facility] [address]        Equal Access to Services     GE[facility]: Rael bolden " Fredi, stacia hernandezadaha, qakarmata kabrent baron, norma anayeliin hayaan dulcejane opalsimon laAjitbianca chika. So [facility] [phone].    ATENCIÓN: Si tavares lr, tiene a allen disposición servicios gratuitos de asistencia lingüística. Genny al [phone].    We comply with applicable federal civil rights laws and [place] laws. We do not discriminate on the basis of race, color, national origin, age, disability, sex, sexual orientation, or gender identity.            Thank you!     Thank you for choosing [facility]  for your care. Our goal is always to provide you with excellent care. Hearing back from our patients is one way we can continue to improve our services. Please take a few minutes to complete the written survey that you may receive in the mail after your visit with us. Thank you!             Your Updated Medication List - Protect others around you: Learn how to safely use, store and throw away your medicines at www.disposemymeds.org.          This list is accurate as of 10/10/18  6:47 PM.  Always use your most recent med list.                   Brand Name Dispense Instructions for use Diagnosis    albuterol 108 (90 Base) MCG/ACT inhaler    VENTOLIN HFA    18 g    Inhale 2 puffs into the lungs 4 times daily    Mild intermittent asthma without complication       amphetamine-dextroamphetamine 20 MG per tablet    ADDERALL    60 tablet    Take 1 tablet (20 mg) by mouth 2 times daily    Attention deficit hyperactivity disorder (ADHD), predominantly inattentive type       cholecalciferol 1000 UNIT tablet    vitamin D3     Take 1,000 Units by mouth daily        OMEGA-3 PLUS 1000 MG Caps      Take 1,000 mg by mouth daily

## 2018-10-10 NOTE — PROGRESS NOTES
"  SUBJECTIVE:   Cinda Peterson is a 28 year old male who presents to clinic today for the following health issues:      Acute Illness   Acute illness concerns: URI  Onset: past 5 days - started on Saturday    Fever: YES    Chills/Sweats: YES    Headache (location?): YES    Sinus Pressure:no    Conjunctivitis:  no    Ear Pain: no    Rhinorrhea: no     Congestion: no     Sore Throat: YES     Cough: YES-productive of yellow sputum    Wheeze: no     Decreased Appetite: YES    Nausea: YES    Vomiting: no     Diarrhea:  no     Dysuria/Freq.: no     Fatigue/Achiness: YES    Sick/Strep Exposure: no      Therapies Tried and outcome: fluids and rest    Initially with sore throat, feeling down and decreased energy.    Very fatigued.    Some improvement. Sore throat has resolved.    Has not used albuterol. He does have an inhaler.    ROS  Body was very tired on Sunday  Prominent mucous congestion    EXAM:  /62 (BP Location: Left arm, Patient Position: Sitting, Cuff Size: Adult Regular)  Pulse 60  Temp 98.2  F (36.8  C) (Oral)  Resp 14  Ht 5' 7.5\" (1.715 m)  Wt 174 lb (78.9 kg)  SpO2 99%  BMI 26.85 kg/m2  Constitutional: Healthy, alert, no distress   EENT: PERRL, TM's clear bilaterally, oropharynx slight erythema, minimal anterior cervical lymphadenopathy   Cardiovascular: RRR. No murmurs   Respiratory: Clear to auscultation     ASSESSMENT    ICD-10-CM    1. Acute bronchitis, unspecified organism J20.9 albuterol (VENTOLIN HFA) 108 (90 Base) MCG/ACT inhaler      Plan:  Patient Instructions   Guaifenisen 400-600mg daily in the morning to loosen secretions.   Herbal (mint or ginger tea) with lemon and honey.  Albuterol 2 puffs every 4 hours for 3 days then as needed.  Vitamin C and Zinc which is naturally occurring in orange juice, but also present in products such as Emergen C or Airborne.  Loratadine 10mg daily.  Tylenol up to 1000mg every 8 hours or Ibuprofen up to 800mg daily as needed for fever and " aches.   Rest.    If not better by 10/15/18, call or my chart - that is the timeline I'd expect you to be healed - if not would start azithromycin.     Benedict Baltazar MD  Family Medicine Physician

## 2018-10-19 ENCOUNTER — MYC MEDICAL ADVICE (OUTPATIENT)
Dept: FAMILY MEDICINE | Facility: CLINIC | Age: 28
End: 2018-10-19

## 2018-10-19 DIAGNOSIS — F90.0 ATTENTION DEFICIT HYPERACTIVITY DISORDER (ADHD), PREDOMINANTLY INATTENTIVE TYPE: ICD-10-CM

## 2018-10-19 RX ORDER — DEXTROAMPHETAMINE SACCHARATE, AMPHETAMINE ASPARTATE, DEXTROAMPHETAMINE SULFATE AND AMPHETAMINE SULFATE 5; 5; 5; 5 MG/1; MG/1; MG/1; MG/1
20 TABLET ORAL 2 TIMES DAILY
Qty: 60 TABLET | Refills: 0 | Status: SHIPPED | OUTPATIENT
Start: 2018-10-19 | End: 2018-12-04

## 2018-10-19 NOTE — TELEPHONE ENCOUNTER
Requested Prescriptions   Pending Prescriptions Disp Refills     amphetamine-dextroamphetamine (ADDERALL) 20 MG per tablet      Last Written Prescription Date:  9/6/18  Last Fill Quantity: 60,   # refills: 0  Last Office Visit: 4/30/18 [facility]  Future Office visit:       Routing refill request to provider for review/approval because:  Drug not on the [facility], [facility] or [facility] refill protocol or controlled substance   60 tablet 0     Sig: Take 1 tablet (20 mg) by mouth 2 times daily    There is no refill protocol information for this order

## 2018-10-23 NOTE — TELEPHONE ENCOUNTER
Patient called requesting prescription be sent to his pharmacy   [facility] [address]     Prescription mailed

## 2018-11-02 ENCOUNTER — MYC MEDICAL ADVICE (OUTPATIENT)
Dept: FAMILY MEDICINE | Facility: CLINIC | Age: 28
End: 2018-11-02

## 2018-11-02 ENCOUNTER — MYC REFILL (OUTPATIENT)
Dept: FAMILY MEDICINE | Facility: CLINIC | Age: 28
End: 2018-11-02

## 2018-11-02 DIAGNOSIS — J20.9 ACUTE BRONCHITIS, UNSPECIFIED ORGANISM: ICD-10-CM

## 2018-11-02 RX ORDER — ALBUTEROL SULFATE 90 UG/1
2 AEROSOL, METERED RESPIRATORY (INHALATION) 4 TIMES DAILY
Qty: 18 G | Refills: 3 | Status: CANCELLED | OUTPATIENT
Start: 2018-11-02

## 2018-11-02 NOTE — TELEPHONE ENCOUNTER
Message from MyChart:  Original authorizing provider: Benedict Baltazar MD    TK Nicholas would like a refill of the following medications:  albuterol (VENTOLIN HFA) 108 (90 Base) MCG/ACT inhaler [Benedict Baltazar MD]    Preferred pharmacy: [facility] [address] [place] [address] AT [facility] [address]    Comment:

## 2018-12-03 ENCOUNTER — MYC MEDICAL ADVICE (OUTPATIENT)
Dept: FAMILY MEDICINE | Facility: CLINIC | Age: 28
End: 2018-12-03

## 2018-12-03 DIAGNOSIS — F90.0 ATTENTION DEFICIT HYPERACTIVITY DISORDER (ADHD), PREDOMINANTLY INATTENTIVE TYPE: ICD-10-CM

## 2018-12-03 NOTE — TELEPHONE ENCOUNTER
Could not respond before Dr Baltazar was leaving the office.  Ok with me if Pt transfers getting the Rx done by Dr Baltazar.  Otherwise see how the Pt wants to do for this month

## 2018-12-03 NOTE — TELEPHONE ENCOUNTER
Dr. Harry Baltazar review the Adderall request although you have not seen him for this.  Shall he do a clinic visit or is E visit okay?   Ritu Valenzuela RN

## 2018-12-10 RX ORDER — DEXTROAMPHETAMINE SACCHARATE, AMPHETAMINE ASPARTATE, DEXTROAMPHETAMINE SULFATE AND AMPHETAMINE SULFATE 5; 5; 5; 5 MG/1; MG/1; MG/1; MG/1
20 TABLET ORAL 2 TIMES DAILY
Qty: 60 TABLET | Refills: 0 | Status: SHIPPED | OUTPATIENT
Start: 2018-12-10 | End: 2019-01-23

## 2018-12-10 NOTE — TELEPHONE ENCOUNTER
Script for 1 month filled, in RN basket, should have visit with me for further refills if I am taking over prescribing of this.    Benedict Baltazar MD  Family Medicine Physician

## 2019-01-23 ENCOUNTER — OFFICE VISIT (OUTPATIENT)
Dept: FAMILY MEDICINE | Facility: CLINIC | Age: 29
End: 2019-01-23
Payer: COMMERCIAL

## 2019-01-23 VITALS
DIASTOLIC BLOOD PRESSURE: 64 MMHG | RESPIRATION RATE: 14 BRPM | WEIGHT: 178 LBS | HEART RATE: 86 BPM | BODY MASS INDEX: 27.47 KG/M2 | SYSTOLIC BLOOD PRESSURE: 110 MMHG | OXYGEN SATURATION: 98 % | TEMPERATURE: 97.9 F

## 2019-01-23 DIAGNOSIS — F90.0 ATTENTION DEFICIT HYPERACTIVITY DISORDER (ADHD), PREDOMINANTLY INATTENTIVE TYPE: ICD-10-CM

## 2019-01-23 DIAGNOSIS — J01.90 ACUTE NON-RECURRENT SINUSITIS, UNSPECIFIED LOCATION: ICD-10-CM

## 2019-01-23 DIAGNOSIS — J30.2 SEASONAL ALLERGIC RHINITIS, UNSPECIFIED TRIGGER: Primary | ICD-10-CM

## 2019-01-23 PROCEDURE — 99214 OFFICE O/P EST MOD 30 MIN: CPT | Performed by: FAMILY MEDICINE

## 2019-01-23 RX ORDER — DEXTROAMPHETAMINE SACCHARATE, AMPHETAMINE ASPARTATE, DEXTROAMPHETAMINE SULFATE AND AMPHETAMINE SULFATE 5; 5; 5; 5 MG/1; MG/1; MG/1; MG/1
20 TABLET ORAL 2 TIMES DAILY
Qty: 60 TABLET | Refills: 0 | Status: SHIPPED | OUTPATIENT
Start: 2019-01-23 | End: 2019-03-28

## 2019-01-23 NOTE — PROGRESS NOTES
SUBJECTIVE:   Cinda Peterson is a 28 year old male who presents to clinic today for the following health issues:    URI     Onset: past 3 months ongoing    Fever: YES    Chills/Sweats: YES    Headache (location?): YES    Sinus Pressure:no    Conjunctivitis:  no    Ear Pain: no    Rhinorrhea: YES    Congestion: YES    Sore Throat: no      Cough: YES-productive of clear sputum, productive of yellow sputum    Wheeze: no     Decreased Appetite: YES    Nausea: no     Vomiting: no     Diarrhea:  yes    Dysuria/Freq.: no     Fatigue/Achiness: YES    Sick/Strep Exposure: no     Other: trouble sleeping, feeling tired.        He has been very sick, initially started prior to New Year's maria isabel. His body feels weak. He feels low energy.    Sneezing initially was the symptom. His cough is now the predominant symptom.    Mucous is clear or yellow.    Initially felt very weak/muscle aches, decreased appetite.    Symptoms are fluctuating.    Adderall working well at current dose.    ROS  No vomiting.  No fever    EXAM:  /64   Pulse 86   Temp 97.9  F (36.6  C)   Resp 14   Wt 80.7 kg (178 lb)   SpO2 98%   BMI 27.47 kg/m    Constitutional: Healthy, alert, no distress   EENT: erythema of conjunctivae, yellow post nasal drainage.  Cardiovascular: RRR. No murmurs   Respiratory: Clear to auscultation     ASSESSMENT    ICD-10-CM    1. Seasonal allergic rhinitis, unspecified trigger J30.2 ALLERGY/ASTHMA ADULT REFERRAL   2. Acute non-recurrent sinusitis, unspecified location J01.90 amoxicillin-clavulanate (AUGMENTIN) 875-125 MG tablet   3. Attention deficit hyperactivity disorder (ADHD), predominantly inattentive type F90.0 amphetamine-dextroamphetamine (ADDERALL) 20 MG tablet      Plan:  Patient Instructions   Follow-up in 1 month to consider treatment for latent tuberculosis.    I expect good response to treatment for bacterial sinusitis with augmentin 875mg by mouth twice daily.    Humidifier in the bedroom 30-40%  humidity, change filter monthly.  Cold air humidifier not vaporizer.   Herbal (mint or shivani tea) with lemon and honey.  Guaifenisen 400-600mg once daily in AM to loosen secretions.      Consider consultation with allergist to perhaps do testing to identify allergens/causes of allergy.     OK to continue current dose of adderall.    Return in about 4 weeks (around 2/20/2019) for Routine Visit.    Benedict Baltazar MD  Family Medicine Physician

## 2019-01-23 NOTE — PATIENT INSTRUCTIONS
Follow-up in 1 month to consider treatment for latent tuberculosis.    I expect good response to treatment for bacterial sinusitis with augmentin 875mg by mouth twice daily.    Humidifier in the bedroom 30-40% humidity, change filter monthly.  Cold air humidifier not vaporizer.   Herbal (mint or shivani tea) with lemon and honey.  Guaifenisen 400-600mg once daily in AM to loosen secretions.      Consider consultation with allergist to perhaps do testing to identify allergens/causes of allergy.

## 2019-02-28 ENCOUNTER — MYC MEDICAL ADVICE (OUTPATIENT)
Dept: FAMILY MEDICINE | Facility: CLINIC | Age: 29
End: 2019-02-28

## 2019-02-28 DIAGNOSIS — J20.9 ACUTE BRONCHITIS, UNSPECIFIED ORGANISM: ICD-10-CM

## 2019-02-28 RX ORDER — ALBUTEROL SULFATE 90 UG/1
2 AEROSOL, METERED RESPIRATORY (INHALATION) 4 TIMES DAILY
Qty: 18 G | Refills: 3 | Status: SHIPPED | OUTPATIENT
Start: 2019-02-28 | End: 2019-04-08

## 2019-02-28 NOTE — TELEPHONE ENCOUNTER
LOV: 1/23/2019    OMsignal sent instructing patient to call to complete an ACT.      Please call to complete  Will fill inhaler 1 x.  Thanks! Symone Fallon RN

## 2019-02-28 NOTE — TELEPHONE ENCOUNTER
Patient returned call to Symone HAIRSTON to complete his ACT. Please call patient back. Thanks! MARCIO JessicaN, RN

## 2019-03-01 ASSESSMENT — ASTHMA QUESTIONNAIRES: ACT_TOTALSCORE: 19

## 2019-04-08 ENCOUNTER — OFFICE VISIT (OUTPATIENT)
Dept: FAMILY MEDICINE | Facility: CLINIC | Age: 29
End: 2019-04-08
Payer: COMMERCIAL

## 2019-04-08 VITALS
WEIGHT: 178 LBS | SYSTOLIC BLOOD PRESSURE: 118 MMHG | TEMPERATURE: 97.8 F | HEART RATE: 80 BPM | BODY MASS INDEX: 27.47 KG/M2 | DIASTOLIC BLOOD PRESSURE: 68 MMHG | OXYGEN SATURATION: 98 % | RESPIRATION RATE: 14 BRPM

## 2019-04-08 DIAGNOSIS — J20.9 ACUTE BRONCHITIS, UNSPECIFIED ORGANISM: ICD-10-CM

## 2019-04-08 DIAGNOSIS — F90.0 ATTENTION DEFICIT HYPERACTIVITY DISORDER (ADHD), PREDOMINANTLY INATTENTIVE TYPE: Primary | ICD-10-CM

## 2019-04-08 PROCEDURE — 99213 OFFICE O/P EST LOW 20 MIN: CPT | Performed by: FAMILY MEDICINE

## 2019-04-08 RX ORDER — ALBUTEROL SULFATE 90 UG/1
2 AEROSOL, METERED RESPIRATORY (INHALATION) 4 TIMES DAILY
Qty: 18 G | Refills: 3 | Status: SHIPPED | OUTPATIENT
Start: 2019-04-08 | End: 2019-09-23

## 2019-04-08 RX ORDER — DEXTROAMPHETAMINE SACCHARATE, AMPHETAMINE ASPARTATE, DEXTROAMPHETAMINE SULFATE AND AMPHETAMINE SULFATE 5; 5; 5; 5 MG/1; MG/1; MG/1; MG/1
20 TABLET ORAL 2 TIMES DAILY
Qty: 60 TABLET | Refills: 0 | Status: SHIPPED | OUTPATIENT
Start: 2019-06-07 | End: 2019-12-27

## 2019-04-08 RX ORDER — DEXTROAMPHETAMINE SACCHARATE, AMPHETAMINE ASPARTATE, DEXTROAMPHETAMINE SULFATE AND AMPHETAMINE SULFATE 5; 5; 5; 5 MG/1; MG/1; MG/1; MG/1
20 TABLET ORAL 2 TIMES DAILY
Qty: 60 TABLET | Refills: 0 | Status: SHIPPED | OUTPATIENT
Start: 2019-05-08 | End: 2019-12-27

## 2019-04-08 RX ORDER — DEXTROAMPHETAMINE SACCHARATE, AMPHETAMINE ASPARTATE, DEXTROAMPHETAMINE SULFATE AND AMPHETAMINE SULFATE 5; 5; 5; 5 MG/1; MG/1; MG/1; MG/1
20 TABLET ORAL 2 TIMES DAILY
Qty: 60 TABLET | Refills: 0 | Status: SHIPPED | OUTPATIENT
Start: 2019-04-08 | End: 2019-12-27

## 2019-04-08 NOTE — PROGRESS NOTES
SUBJECTIVE:                                                    Cinda Peterson is a 28 year old male who presents to clinic today for the following health issues:          Medication Followup of desiall     Taking Medication as prescribed: yes    Side Effects:  None    Medication Helping Symptoms:  yes     He is taking 20mg of adderall twice daily and tolerating it OK.    It is helping with his ability to focus and get his job done.  No noted headaches or palpitations.    Uses albuterol a couple times a week when symptoms are acting up.    EXAM:  /68   Pulse 80   Temp 97.8  F (36.6  C)   Resp 14   Wt 80.7 kg (178 lb)   SpO2 98%   BMI 27.47 kg/m    Constitutional: Healthy, alert, no distress   EENT: some erythema of conjunctivae  Cardiovascular: RRR. No murmurs   Respiratory: Clear to auscultation     ASSESSMENT    ICD-10-CM    1. Attention deficit hyperactivity disorder (ADHD), predominantly inattentive type F90.0 amphetamine-dextroamphetamine (ADDERALL) 20 MG tablet     amphetamine-dextroamphetamine (ADDERALL) 20 MG tablet     amphetamine-dextroamphetamine (ADDERALL) 20 MG tablet   2. Acute bronchitis, unspecified organism J20.9 albuterol (VENTOLIN HFA) 108 (90 Base) MCG/ACT inhaler      Plan:  Patient Instructions   Ok to continue current dose of adderall.    If you are consistently using albuterol 2 times a week at nighttime or 4 times a week during daytime we should consider a preventive asthma medication.       Return in about 3 months (around 7/8/2019) for Routine Visit and medication fill.    Benedict Baltazar MD  Family Medicine Physician

## 2019-04-08 NOTE — PATIENT INSTRUCTIONS
Ok to continue current dose of adderall.    If you are consistently using albuterol 2 times a week at nighttime or 4 times a week during daytime we should consider a preventive asthma medication.

## 2019-04-09 ASSESSMENT — ASTHMA QUESTIONNAIRES: ACT_TOTALSCORE: 25

## 2019-06-12 DIAGNOSIS — F90.0 ATTENTION DEFICIT HYPERACTIVITY DISORDER (ADHD), PREDOMINANTLY INATTENTIVE TYPE: ICD-10-CM

## 2019-06-12 RX ORDER — DEXTROAMPHETAMINE SACCHARATE, AMPHETAMINE ASPARTATE, DEXTROAMPHETAMINE SULFATE AND AMPHETAMINE SULFATE 5; 5; 5; 5 MG/1; MG/1; MG/1; MG/1
20 TABLET ORAL 2 TIMES DAILY
Qty: 60 TABLET | Refills: 0 | Status: CANCELLED | OUTPATIENT
Start: 2019-06-12

## 2019-06-12 NOTE — TELEPHONE ENCOUNTER
We got a message from the [facility] that this is not covered. Please call plan at [phone] his ID # is [id number]

## 2019-06-13 ENCOUNTER — TELEPHONE (OUTPATIENT)
Dept: FAMILY MEDICINE | Facility: CLINIC | Age: 29
End: 2019-06-13

## 2019-06-13 ENCOUNTER — MYC MEDICAL ADVICE (OUTPATIENT)
Dept: FAMILY MEDICINE | Facility: CLINIC | Age: 29
End: 2019-06-13

## 2019-06-13 NOTE — TELEPHONE ENCOUNTER
Prior Authorization Approval    Authorization Effective Date: 6/13/2019  Authorization Expiration Date: 6/12/2020  Medication: amphetamine-dextroamphetamine (ADDERALL) 20 MG tablet  Approved Dose/Quantity: 60  Reference #:   [id number]  Insurance Company: [employer] - Phone [phone] Fax [phone]  Which Pharmacy is filling the prescription (Not needed for infusion/clinic administered): [facility] [address] [place] [address] AT [address] [facility]  Pharmacy Notified: Yes  Patient Notified: **Instructed pharmacy to notify patient when script is ready to /ship.**

## 2019-06-13 NOTE — TELEPHONE ENCOUNTER
PA team unable to document in a RxAuth/refill encounter, creating a new telephone encounter. Please see encounter created 06/13/19 for updates.

## 2019-06-13 NOTE — TELEPHONE ENCOUNTER
Clinic got a message from the pharmacy that this is not covered. Plan PH#  [phone] his ID # is [id number]    PA Initiation    Medication: amphetamine-dextroamphetamine (ADDERALL) 20 MG tablet  Insurance Company: [employer] - Phone [phone] Fax [phone]  Pharmacy Filling the Rx: [facility] [address] [place] [address] AT [address]  Filling Pharmacy Phone: [phone]  Filling Pharmacy Fax:    Start Date: 6/13/2019    Central Prior Authorization Team   Phone: [phone]

## 2019-06-13 NOTE — TELEPHONE ENCOUNTER
Patient call to check on the status of PA. Writer spoke with someone from the PA team who states that we can tamar this as high priority since patient has called a few times regarding rx.    Halley Reyes

## 2019-07-26 ENCOUNTER — OFFICE VISIT (OUTPATIENT)
Dept: FAMILY MEDICINE | Facility: CLINIC | Age: 29
End: 2019-07-26
Payer: COMMERCIAL

## 2019-07-26 VITALS
SYSTOLIC BLOOD PRESSURE: 102 MMHG | BODY MASS INDEX: 28.39 KG/M2 | WEIGHT: 184 LBS | TEMPERATURE: 98.7 F | DIASTOLIC BLOOD PRESSURE: 64 MMHG | HEART RATE: 80 BPM

## 2019-07-26 DIAGNOSIS — F90.0 ATTENTION DEFICIT HYPERACTIVITY DISORDER (ADHD), PREDOMINANTLY INATTENTIVE TYPE: ICD-10-CM

## 2019-07-26 PROCEDURE — 99213 OFFICE O/P EST LOW 20 MIN: CPT | Performed by: NURSE PRACTITIONER

## 2019-07-26 RX ORDER — DEXTROAMPHETAMINE SACCHARATE, AMPHETAMINE ASPARTATE, DEXTROAMPHETAMINE SULFATE AND AMPHETAMINE SULFATE 5; 5; 5; 5 MG/1; MG/1; MG/1; MG/1
20 TABLET ORAL 2 TIMES DAILY
Qty: 60 TABLET | Refills: 0 | Status: CANCELLED | OUTPATIENT
Start: 2019-07-26

## 2019-07-26 RX ORDER — DEXTROAMPHETAMINE SACCHARATE, AMPHETAMINE ASPARTATE, DEXTROAMPHETAMINE SULFATE AND AMPHETAMINE SULFATE 5; 5; 5; 5 MG/1; MG/1; MG/1; MG/1
20 TABLET ORAL 2 TIMES DAILY
Qty: 60 TABLET | Refills: 0 | Status: SHIPPED | OUTPATIENT
Start: 2019-07-26 | End: 2019-08-25

## 2019-07-26 RX ORDER — DEXTROAMPHETAMINE SACCHARATE, AMPHETAMINE ASPARTATE, DEXTROAMPHETAMINE SULFATE AND AMPHETAMINE SULFATE 5; 5; 5; 5 MG/1; MG/1; MG/1; MG/1
20 TABLET ORAL 2 TIMES DAILY
Qty: 60 TABLET | Refills: 0 | Status: SHIPPED | OUTPATIENT
Start: 2019-09-23 | End: 2020-03-06

## 2019-07-26 RX ORDER — DEXTROAMPHETAMINE SACCHARATE, AMPHETAMINE ASPARTATE, DEXTROAMPHETAMINE SULFATE AND AMPHETAMINE SULFATE 5; 5; 5; 5 MG/1; MG/1; MG/1; MG/1
20 TABLET ORAL 2 TIMES DAILY
Qty: 60 TABLET | Refills: 0 | Status: SHIPPED | OUTPATIENT
Start: 2019-08-23 | End: 2020-03-06

## 2019-07-26 NOTE — PROGRESS NOTES
Subjective     Cinda Peterson is a 29 year old male who presents to clinic today for the following health issues:    HPI   Medication Followup of Adderall    Taking Medication as prescribed: yes    Side Effects:  None    Medication Helping Symptoms:  yes     ADHD    Onset: since childhood     Description:   Easily distracted: YES  Short attention span: YES  Trouble following directions: YES   Impulsive behavior: no   Trouble completing tasks: YES    Accompanying Signs & Symptoms:        Change in sleep pattern: none  Irritability at certain times of the day: no  Socially withdrawn: no  Depression symptoms: no  Anxiety symptoms: no    History:  Caffeine intake: Small  Loss of appetite: no  Healthy diet: YES  Did you have problems in school or with previous employment: no  Family history of ADHD: no  Have you had an evaluation for ADHD in the past: YES  Do you use alcohol or drugs: no    Therapies tried: Adderall (concerta) with total relief    Has been taking Adderall 20 mg IR BID for several years  Working well  Not having any side effects  BP and weight stable  Wants to stay on this dose  Due for refills today.          Reviewed and updated as needed this visit by Provider  Tobacco  Allergies  Meds  Problems  Med Hx  Surg Hx  Fam Hx         Review of Systems   ROS COMP: Constitutional, HEENT, cardiovascular, pulmonary, gi and gu systems are negative, except as otherwise noted.      Objective    /64   Pulse 80   Temp 98.7  F (37.1  C) (Oral)   Wt 83.5 kg (184 lb)   BMI 28.39 kg/m    Body mass index is 28.39 kg/m .  Physical Exam   GENERAL: healthy, alert and no distress  RESP: lungs clear to auscultation - no rales, rhonchi or wheezes  CV: regular rate and rhythm, normal S1 S2, no S3 or S4, no murmur, click or rub, no peripheral edema and peripheral pulses strong  MS: no gross musculoskeletal defects noted, no edema  SKIN: no suspicious lesions or rashes  PSYCH: mentation appears normal,  affect normal/bright          Assessment & Plan       ICD-10-CM    1. Attention deficit hyperactivity disorder (ADHD), predominantly inattentive type F90.0 amphetamine-dextroamphetamine (ADDERALL) 20 MG tablet     amphetamine-dextroamphetamine (ADDERALL) 20 MG tablet     amphetamine-dextroamphetamine (ADDERALL) 20 MG tablet      Discussed the nature of ADHD and medications with side effects.  Refill meds 3 months.  Must fill at [facility] pharmacy, they will hold onto remaining Rx.  Will not replace lost or stolen prescriptions.    Follow-up:  3 months on the phone or via com[employer]hart to verify things are going well and not having any side effects. Then 6 months in the clinic to get weight and BP check.        Return in about 3 months (around 10/26/2019) for Routine E Visit.    KORIN Ordonez [facility]

## 2019-09-23 ENCOUNTER — OFFICE VISIT (OUTPATIENT)
Dept: FAMILY MEDICINE | Facility: CLINIC | Age: 29
End: 2019-09-23
Payer: COMMERCIAL

## 2019-09-23 VITALS
DIASTOLIC BLOOD PRESSURE: 64 MMHG | RESPIRATION RATE: 13 BRPM | TEMPERATURE: 97.8 F | SYSTOLIC BLOOD PRESSURE: 94 MMHG | WEIGHT: 178.5 LBS | OXYGEN SATURATION: 100 % | HEART RATE: 59 BPM | BODY MASS INDEX: 28.69 KG/M2 | HEIGHT: 66 IN

## 2019-09-23 DIAGNOSIS — Z23 NEED FOR PROPHYLACTIC VACCINATION AND INOCULATION AGAINST INFLUENZA: ICD-10-CM

## 2019-09-23 DIAGNOSIS — Z22.7 TB LUNG, LATENT: ICD-10-CM

## 2019-09-23 DIAGNOSIS — J30.2 SEASONAL ALLERGIC RHINITIS, UNSPECIFIED TRIGGER: Primary | ICD-10-CM

## 2019-09-23 DIAGNOSIS — J45.20 MILD INTERMITTENT ASTHMA WITHOUT COMPLICATION: ICD-10-CM

## 2019-09-23 PROCEDURE — 90471 IMMUNIZATION ADMIN: CPT | Performed by: FAMILY MEDICINE

## 2019-09-23 PROCEDURE — 99214 OFFICE O/P EST MOD 30 MIN: CPT | Mod: 25 | Performed by: FAMILY MEDICINE

## 2019-09-23 PROCEDURE — 90686 IIV4 VACC NO PRSV 0.5 ML IM: CPT | Performed by: FAMILY MEDICINE

## 2019-09-23 RX ORDER — ALBUTEROL SULFATE 90 UG/1
2 AEROSOL, METERED RESPIRATORY (INHALATION) 4 TIMES DAILY
Qty: 18 G | Refills: 3 | Status: SHIPPED | OUTPATIENT
Start: 2019-09-23 | End: 2019-09-23

## 2019-09-23 RX ORDER — ALBUTEROL SULFATE 90 UG/1
2 AEROSOL, METERED RESPIRATORY (INHALATION) 4 TIMES DAILY
Qty: 18 G | Refills: 3 | Status: SHIPPED | OUTPATIENT
Start: 2019-09-23 | End: 2019-12-30

## 2019-09-23 ASSESSMENT — MIFFLIN-ST. JEOR: SCORE: 1722.42

## 2019-09-23 NOTE — PROGRESS NOTES
Subjective     Cinda Peterson is a 29 year old male who presents to clinic today for the following health issues:    HPI   Asthma Follow-Up    Was ACT completed today?    Yes    ACT Total Scores 9/23/2019   ACT TOTAL SCORE (Goal Greater than or Equal to 20) 20   In the past 12 months, how many times did you visit the emergency room for your asthma without being admitted to the hospital? 0   In the past 12 months, how many times were you hospitalized overnight because of your asthma? 0       How many days per week do you miss taking your asthma controller medication?  I do not have an asthma controller medication    Please describe any recent triggers for your asthma: Patient is unaware of triggers- come at night     Have you had any Emergency Room Visits, Urgent Care Visits, or Hospital Admissions since your last office visit?  No    How many servings of fruits and vegetables do you eat daily?  2-3    On average, how many sweetened beverages do you drink each day (soda, juice, sweet tea, etc)?   0  How many days per week do you miss taking your medication?  Taking it as needed    What makes it hard for you to take your medications?  taking as needed.    Pt state looking to get a referral to see an allergists     Used to have cat allergies.   Moved and not using allergy medication.   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     Around Oct month - gets sick easily. Not sure why.     Latent TB - never started medication. No direct patient contact.          Social History     Occupational History     Not on file   Tobacco Use     Smoking status: Never Smoker     Smokeless tobacco: Never Used   Substance and Sexual Activity     Alcohol use: No     Alcohol/week: 0.0 standard drinks     Comment: quit drinking     Drug use: No     Sexual activity: Yes     Partners: Female     Birth control/protection: Condom     No Known Allergies  Patient Active Problem List   Diagnosis     H/O vitamin D deficiency      "Attention deficit hyperactivity disorder (ADHD), predominantly inattentive type     Mild intermittent asthma without complication     TB lung, latent     Reviewed medications, social history and  past medical and surgical history.    Review of system: for general, respiratory, CVS, GI and psychiatry negative except for noted above.     EXAM:  BP 94/64 (BP Location: Right arm, Patient Position: Chair, Cuff Size: Adult Large)   Pulse 59   Temp 97.8  F (36.6  C) (Oral)   Resp 13   Ht 1.676 m (5' 6\")   Wt 81 kg (178 lb 8 oz)   SpO2 100%   BMI 28.81 kg/m    Constitutional: healthy, alert and no distress   Psychiatric: mentation appears normal and affect normal/bright  Cardiovascular: RRR. No murmurs,  Respiratory: negative, Lungs clear. No crackles or wheezing. No tachypnea.        ASSESSMENT / PLAN:  (J30.2) Seasonal allergic rhinitis, unspecified trigger  (primary encounter diagnosis)  Comment: Contributing to his asthma.  Persistent symptoms and he would like to find out further about possible culprit.  Given allergy/ asthma referral.  Plan: ALLERGY/ASTHMA ADULT REFERRAL             (J45.20) Mild intermittent asthma without complication  Comment: See above.  Using albuterol as needed.  We discussed about using inhaled steroid.  He is going to hold off until seen by allergy specialist.  If they do not recommend any inhaled steroid and if you would like to give it a try he can certainly call me.  Plan: ALLERGY/ASTHMA ADULT REFERRAL             (R76.11) TB lung, latent  Comment:    Plan: From his history and as per his records ( had positive QuantiFERON test).  He did not do latent tuberculosis treatment.  He is asymptomatic.  We discussed why it is important to do treatment.  He would like to hold off on it for now and he is going to do some research on his own.  Does not come in contact with patients or sick people directly.     (Z23) Need for prophylactic vaccination and inoculation against influenza  Comment:  "   Plan: INFLUENZA VACCINE IM > 6 MONTHS VALENT IIV4         [81099], Vaccine Administration, Initial         [79911]                  The above note was dictated using voice recognition. Although reviewed after completion, some word and grammatical error may remain .      Patient Instructions   The Difference Between Latent TB Infection and TB Disease  What is TB?  Tuberculosis (TB) is a disease caused by a germ called Mycobacterium tuberculosis that is spread from person to person through the air. TB usually affects the lungs, but it can also affect other parts of the body, such as the brain, the kidneys, or the spine. When a person with infectious TB coughs or sneezes, droplet nuclei containing M. tuberculosis are expelled into the air. If another person inhales air containing these droplet nuclei, he or she may become infected. However, not everyone infected with TB bacteria becomes sick. As a result, two TB-related conditions exist: latent TB infection and TB disease.  What is Latent TB Infection?  Persons with latent TB infection do not feel sick and do not have any symptoms. They are infected with M. tuberculosis, but do not have TB disease. The only sign of TB infection is a positive reaction to the tuberculin skin test or TB blood test. Persons with latent TB infection are not infectious and cannot spread TB infection to others.  Overall, without treatment, about 5 to 10% of infected persons will develop TB disease at some time in their lives. About half of those people who develop TB will do so within the first two years of infection. For persons whose immune systems are weak, especially those with HIV infection, the risk of developing TB disease is considerably higher than for persons with normal immune systems.  Of special concern are persons infected by someone with extensively drug-resistant TB (XDR TB) who later develop TB disease; these persons will have XDR TB, not regular TB disease.  A person with  latent TB infection   Usually has a skin test or blood test result indicating TB infection   Has a normal chest x-ray and a negative sputum test   Has TB bacteria in his/her body that are alive, but inactive   Does not feel sick,   Cannot spread TB bacteria to others   Needs treatment for latent TB infection to prevent TB disease; however, if exposed and infected by a person with multidrug-resistant TB (MDR TB) or extensively drug-resistant TB (XDR TB), preventive treatment may not be an option  What is TB Disease?  In some people, TB bacteria overcome the defenses of the immune system and begin to multiply, resulting in the progression from latent TB infection to TB disease. Some people develop TB disease soon after infection, while others develop TB disease later when their immune system becomes weak.  The general symptoms of TB disease include  Unexplained weight loss   Loss of appetite   Night sweats   Fever   Fatigue   Chills  The symptoms of TB of the lungs include  Coughing for 3 weeks or longer   Hemoptysis (coughing up blood)   Chest pain  Other symptoms depend on the part of the body that is  affected.  Persons with TB disease are considered infectious and may spread TB bacteria to others. If TB disease is suspected, persons should be referred for a complete medical evaluation. If it is determined that a person has TB disease, therapy is given to treat it. TB disease is a serious condition and can lead to death if not treated.  A person with TB disease   Usually has a skin test or blood test result indicating TB infection   May have an abnormal chest x-ray, or positive sputum smear or culture   Has active TB bacteria in his/her body   Usually feels sick and may have symptoms such as coughing, fever, and weight loss   May spread TB bacteria to others   Needs treatment to treat TB disease            Injectable Influenza Immunization Documentation    1.  Is the person to be vaccinated sick today?   No    2.  Does the person to be vaccinated have an allergy to a component   of the vaccine?   No  Egg Allergy Algorithm Link    3. Has the person to be vaccinated ever had a serious reaction   to influenza vaccine in the past?   No    4. Has the person to be vaccinated ever had Guillain-Barré syndrome?   No    Form completed by Ashley Winslow MA

## 2019-09-23 NOTE — PATIENT INSTRUCTIONS
The Difference Between Latent TB Infection and TB Disease  What is TB?  Tuberculosis (TB) is a disease caused by a germ called Mycobacterium tuberculosis that is spread from person to person through the air. TB usually affects the lungs, but it can also affect other parts of the body, such as the brain, the kidneys, or the spine. When a person with infectious TB coughs or sneezes, droplet nuclei containing M. tuberculosis are expelled into the air. If another person inhales air containing these droplet nuclei, he or she may become infected. However, not everyone infected with TB bacteria becomes sick. As a result, two TB-related conditions exist: latent TB infection and TB disease.  What is Latent TB Infection?  Persons with latent TB infection do not feel sick and do not have any symptoms. They are infected with M. tuberculosis, but do not have TB disease. The only sign of TB infection is a positive reaction to the tuberculin skin test or TB blood test. Persons with latent TB infection are not infectious and cannot spread TB infection to others.  Overall, without treatment, about 5 to 10% of infected persons will develop TB disease at some time in their lives. About half of those people who develop TB will do so within the first two years of infection. For persons whose immune systems are weak, especially those with HIV infection, the risk of developing TB disease is considerably higher than for persons with normal immune systems.  Of special concern are persons infected by someone with extensively drug-resistant TB (XDR TB) who later develop TB disease; these persons will have XDR TB, not regular TB disease.  A person with latent TB infection   Usually has a skin test or blood test result indicating TB infection   Has a normal chest x-ray and a negative sputum test   Has TB bacteria in his/her body that are alive, but inactive   Does not feel sick,   Cannot spread TB bacteria to others   Needs treatment for latent  TB infection to prevent TB disease; however, if exposed and infected by a person with multidrug-resistant TB (MDR TB) or extensively drug-resistant TB (XDR TB), preventive treatment may not be an option  What is TB Disease?  In some people, TB bacteria overcome the defenses of the immune system and begin to multiply, resulting in the progression from latent TB infection to TB disease. Some people develop TB disease soon after infection, while others develop TB disease later when their immune system becomes weak.  The general symptoms of TB disease include  Unexplained weight loss   Loss of appetite   Night sweats   Fever   Fatigue   Chills  The symptoms of TB of the lungs include  Coughing for 3 weeks or longer   Hemoptysis (coughing up blood)   Chest pain  Other symptoms depend on the part of the body that is  affected.  Persons with TB disease are considered infectious and may spread TB bacteria to others. If TB disease is suspected, persons should be referred for a complete medical evaluation. If it is determined that a person has TB disease, therapy is given to treat it. TB disease is a serious condition and can lead to death if not treated.  A person with TB disease   Usually has a skin test or blood test result indicating TB infection   May have an abnormal chest x-ray, or positive sputum smear or culture   Has active TB bacteria in his/her body   Usually feels sick and may have symptoms such as coughing, fever, and weight loss   May spread TB bacteria to others   Needs treatment to treat TB disease

## 2019-09-24 ASSESSMENT — ASTHMA QUESTIONNAIRES: ACT_TOTALSCORE: 20

## 2019-10-29 ENCOUNTER — OFFICE VISIT (OUTPATIENT)
Dept: FAMILY MEDICINE | Facility: CLINIC | Age: 29
End: 2019-10-29
Payer: COMMERCIAL

## 2019-10-29 VITALS
RESPIRATION RATE: 16 BRPM | DIASTOLIC BLOOD PRESSURE: 64 MMHG | TEMPERATURE: 97.8 F | OXYGEN SATURATION: 100 % | HEIGHT: 66 IN | WEIGHT: 184 LBS | HEART RATE: 67 BPM | BODY MASS INDEX: 29.57 KG/M2 | SYSTOLIC BLOOD PRESSURE: 100 MMHG

## 2019-10-29 DIAGNOSIS — F90.0 ATTENTION DEFICIT HYPERACTIVITY DISORDER (ADHD), PREDOMINANTLY INATTENTIVE TYPE: ICD-10-CM

## 2019-10-29 DIAGNOSIS — Z13.6 SCREENING FOR CARDIOVASCULAR CONDITION: ICD-10-CM

## 2019-10-29 DIAGNOSIS — Z00.00 ROUTINE GENERAL MEDICAL EXAMINATION AT A HEALTH CARE FACILITY: Primary | ICD-10-CM

## 2019-10-29 DIAGNOSIS — Z11.4 SCREENING FOR HIV (HUMAN IMMUNODEFICIENCY VIRUS): ICD-10-CM

## 2019-10-29 DIAGNOSIS — M25.522 LEFT ELBOW PAIN: ICD-10-CM

## 2019-10-29 DIAGNOSIS — Z13.1 SCREENING FOR DIABETES MELLITUS: ICD-10-CM

## 2019-10-29 LAB
CHOLEST SERPL-MCNC: 143 MG/DL
GLUCOSE SERPL-MCNC: 78 MG/DL (ref 70–99)
HDLC SERPL-MCNC: 43 MG/DL
LDLC SERPL CALC-MCNC: 89 MG/DL
NONHDLC SERPL-MCNC: 100 MG/DL
TRIGL SERPL-MCNC: 53 MG/DL

## 2019-10-29 PROCEDURE — 99213 OFFICE O/P EST LOW 20 MIN: CPT | Mod: 25 | Performed by: FAMILY MEDICINE

## 2019-10-29 PROCEDURE — 99395 PREV VISIT EST AGE 18-39: CPT | Performed by: FAMILY MEDICINE

## 2019-10-29 PROCEDURE — 87389 HIV-1 AG W/HIV-1&-2 AB AG IA: CPT | Performed by: FAMILY MEDICINE

## 2019-10-29 PROCEDURE — 36415 COLL VENOUS BLD VENIPUNCTURE: CPT | Performed by: FAMILY MEDICINE

## 2019-10-29 PROCEDURE — 80061 LIPID PANEL: CPT | Performed by: FAMILY MEDICINE

## 2019-10-29 PROCEDURE — 82947 ASSAY GLUCOSE BLOOD QUANT: CPT | Performed by: FAMILY MEDICINE

## 2019-10-29 RX ORDER — DEXTROAMPHETAMINE SACCHARATE, AMPHETAMINE ASPARTATE MONOHYDRATE, DEXTROAMPHETAMINE SULFATE AND AMPHETAMINE SULFATE 5; 5; 5; 5 MG/1; MG/1; MG/1; MG/1
20 CAPSULE, EXTENDED RELEASE ORAL EVERY MORNING
Qty: 30 CAPSULE | Refills: 0 | Status: SHIPPED | OUTPATIENT
Start: 2019-12-23 | End: 2019-12-27

## 2019-10-29 ASSESSMENT — ENCOUNTER SYMPTOMS
PALPITATIONS: 0
HEARTBURN: 0
MYALGIAS: 1
CHILLS: 0
SHORTNESS OF BREATH: 0
NAUSEA: 0
HEMATOCHEZIA: 0
FEVER: 0
ARTHRALGIAS: 0
COUGH: 0
DIARRHEA: 0
DIZZINESS: 0
SORE THROAT: 0
HEADACHES: 0
PARESTHESIAS: 0
EYE PAIN: 0
WEAKNESS: 0
ABDOMINAL PAIN: 0
NERVOUS/ANXIOUS: 0
HEMATURIA: 0
DYSURIA: 0
FREQUENCY: 1
CONSTIPATION: 0
JOINT SWELLING: 0

## 2019-10-29 ASSESSMENT — MIFFLIN-ST. JEOR: SCORE: 1747.37

## 2019-10-29 NOTE — PROGRESS NOTES
SUBJECTIVE:   CC: Cinda Peterson is an 29 year old male who presents for preventative health visit.     Healthy Habits:     Getting at least 3 servings of Calcium per day:  Yes    Bi-annual eye exam:  Yes    Dental care twice a year:  NO    Sleep apnea or symptoms of sleep apnea:  None    Diet:  Regular (no restrictions)    Frequency of exercise:  4-5 days/week    Duration of exercise:  Greater than 60 minutes    Taking medications regularly:  Yes    Medication side effects:  None    PHQ-2 Total Score: 0    Additional concerns today:  No    Other:  Muscle soreness     Today's PHQ-2 Score:   PHQ-2 ( 1999 Pf[employer]) 10/29/2019   Q1: Little interest or pleasure in doing things 0   Q2: Feeling down, depressed or hopeless 0   PHQ-2 Score 0   Q1: Little interest or pleasure in doing things Not at all   Q2: Feeling down, depressed or hopeless Not at all   PHQ-2 Score 0       Abuse: Current or Past(Physical, Sexual or Emotional)- No  Do you feel safe in your environment? Yes    Social History     Tobacco Use     Smoking status: Never Smoker     Smokeless tobacco: Never Used   Substance Use Topics     Alcohol use: No     Alcohol/week: 0.0 standard drinks     Comment: quit drinking     If you drink alcohol do you typically have >3 drinks per day or >7 drinks per week? No    Alcohol Use 10/29/2019   Prescreen: >3 drinks/day or >7 drinks/week? No   Prescreen: >3 drinks/day or >7 drinks/week? -   No flowsheet data found.    Last PSA: No results found for: PSA    Reviewed orders with patient. Reviewed health maintenance and updated orders accordingly - Yes     Reviewed and updated as needed this visit by clinical staff    Reviewed and updated as needed this visit by Provider    Left elbow - golfer elbow. Improving. Want to go for massage therapy.     Doing exercise 5 times per week.     Adderall - no major issue. No side effects. Taking 2 times per day.          Review of Systems   Constitutional: Negative for chills and  "fever.   HENT: Negative for congestion, ear pain, hearing loss and sore throat.    Eyes: Negative for pain and visual disturbance.   Respiratory: Negative for cough and shortness of breath.    Cardiovascular: Negative for chest pain, palpitations and peripheral edema.   Gastrointestinal: Negative for abdominal pain, constipation, diarrhea, heartburn, hematochezia and nausea.   Genitourinary: Positive for frequency. Negative for discharge, dysuria, genital sores, hematuria, impotence and urgency.   Musculoskeletal: Positive for myalgias. Negative for arthralgias and joint swelling.   Skin: Negative for rash.   Neurological: Negative for dizziness, weakness, headaches and paresthesias.   Psychiatric/Behavioral: Negative for mood changes. The patient is not nervous/anxious.      OBJECTIVE:   /64 (BP Location: Right arm, Patient Position: Sitting, Cuff Size: Adult Regular)   Pulse 67   Temp 97.8  F (36.6  C) (Oral)   Resp 16   Ht 1.676 m (5' 6\")   Wt 83.5 kg (184 lb)   SpO2 100%   BMI 29.70 kg/m      Physical Exam  GENERAL: healthy, alert and no distress  EYES: Eyes grossly normal to inspection, PERRL and conjunctivae and sclerae normal  HENT: ear canals and TM's normal, nose and mouth without ulcers or lesions  NECK: no adenopathy, no asymmetry, masses, or scars and thyroid normal to palpation  RESP: lungs clear to auscultation - no rales, rhonchi or wheezes  CV: regular rate and rhythm, normal S1 S2, no S3 or S4, no murmur, click or rub, no peripheral edema and peripheral pulses strong  ABDOMEN: soft, nontender, no hepatosplenomegaly, no masses and bowel sounds normal   (male): normal male genitalia without lesions or urethral discharge, no hernia  MS: no gross musculoskeletal defects noted, no edema, left elbow on medial epicondyles mild diffuse tenderness.  Range of motion not restricted.  SKIN: no suspicious lesions or rashes  NEURO: Normal strength and tone, mentation intact and speech " normal  PSYCH: mentation appears normal, affect normal/bright    ASSESSMENT/PLAN:   1. Routine general medical examination at a health care facility       2. Left elbow pain  Mild medial epicondylitis symptoms.  Discussed hand therapy would be most helpful.  He is more interested in doing massage therapy.  He would like to start with massage therapist and if that is not covered he would like to go to see a chiropractor who will use device that provides massage therapy.  He was strongly advised to check his insurance coverage despite getting a referral.  - [facility]; Future  - MASSAGE THERAPY REFERRAL    3. Attention deficit hyperactivity disorder (ADHD), predominantly inattentive type  Discussed immediate release stimulants are not usually recommended for adults and extended release and most ideal to use it as it is FDA approved for adults.  -He is willing to try it.  He would like to get a 1 month of prescription and if it is not covered he is going to contact me and we can do prior authorization with the information of FDA recommendations.  -If it is covered and if he is willing to continue the same Rx, he can send me a message and I can refill both the January and February month of prescription.  -He currently has a prescription for him to November month.  - amphetamine-dextroamphetamine (ADDERALL XR) 20 MG 24 hr capsule; Take 1 capsule (20 mg) by mouth every morning  Dispense: 30 capsule; Refill: 0    4. Screening for diabetes mellitus     - Glucose    5. Screening for cardiovascular condition     - Lipid panel reflex to direct LDL Fasting    6. Screening for HIV (human immunodeficiency virus)     - HIV Antigen Antibody Combo    COUNSELING:   Reviewed preventive health counseling, as reflected in patient instructions  Special attention given to:        Regular exercise       Healthy diet/nutrition       Vision screening       Hearing screening    Estimated body mass index is 29.7  "kg/m  as calculated from the following:    Height as of this encounter: 1.676 m (5' 6\").    Weight as of this encounter: 83.5 kg (184 lb).     Weight management plan: Has a very healthy exercise regimen     reports that he has never smoked. He has never used smokeless tobacco.      Counseling Resources:  ATP IV Guidelines  Pooled Cohorts Equation Calculator  FRAX Risk Assessment  ICSI Preventive Guidelines  Dietary Guidelines for Americans, 2010  USDA's MyPlate  ASA Prophylaxis  Lung CA Screening    Vish Michael MD  [facility]  "

## 2019-10-30 LAB — HIV 1+2 AB+HIV1 P24 AG SERPL QL IA: NONREACTIVE

## 2019-11-12 ENCOUNTER — MYC MEDICAL ADVICE (OUTPATIENT)
Dept: FAMILY MEDICINE | Facility: CLINIC | Age: 29
End: 2019-11-12

## 2019-11-12 DIAGNOSIS — J45.20 MILD INTERMITTENT ASTHMA WITHOUT COMPLICATION: Primary | ICD-10-CM

## 2019-11-12 RX ORDER — FLUTICASONE PROPIONATE 44 UG/1
1 AEROSOL, METERED RESPIRATORY (INHALATION) 2 TIMES DAILY
Qty: 1 INHALER | Refills: 3 | Status: SHIPPED | OUTPATIENT
Start: 2019-11-12 | End: 2021-04-09

## 2019-11-12 NOTE — TELEPHONE ENCOUNTER
Signed flovent. Rinse mouth with water after using inhaler.   Remind him that it should be used on daily basis and use albuterol as needed.

## 2019-12-05 NOTE — PROGRESS NOTES
[facility]   Clinic Visit s Dec 6, 2019    PCP: No Ref-Primary, Physician      Cinda is a 29 year old male who is seen as self referral for   Chief Complaint   Patient presents with     Left Elbow - Pain       Injury: Reports insidious onset without acute precipitating event.     Right hand dominant    Location of Pain: left elbow medial, nonradiating   Duration of Pain: acute, 2 month(s),   Rating of Pain at worst: 6/10  Rating of Pain Currently: 3/10  Pain is better with: activity avoidance and rest   Pain is worse with: pushing  Treatment so far consists of: activity avoidance  Associated symptoms: no distal numbness or tingling; denies swelling or warmth  Recent imaging completed: No recent imaging completed.  Prior History of related problems: none    Social History: is employed as a/an [employer]      Past Medical History:   Diagnosis Date     Adult ADHD      H/O vitamin D deficiency         Patient Active Problem List    Diagnosis Date Noted     TB lung, latent 09/23/2019     Priority: Medium     Mild intermittent asthma without complication 04/30/2018     Priority: Medium     H/O vitamin D deficiency      Priority: Medium     Attention deficit hyperactivity disorder (ADHD), predominantly inattentive type      Priority: Medium       Family History   Problem Relation Age of Onset     No Known Problems Mother      No Known Problems Father      No Known Problems Maternal Grandmother        Social History     Socioeconomic History     Marital status: Single     Spouse name: Not on file     Number of children: Not on file     Years of education: Not on file     Highest education level: Not on file   Occupational History     Not on file   Social Needs     Financial resource strain: Not on file     Food insecurity:     Worry: Not on file     Inability: Not on file     Transportation needs:     Medical: Not on file     Non-medical: Not on file   Tobacco Use     Smoking status: Never  "Smoker     Smokeless tobacco: Never Used   Substance and Sexual Activity     Alcohol use: No     Alcohol/week: 0.0 standard drinks     Comment: quit drinking     Drug use: No     PAST SURGICAL HISTORY  No surgeries reported.       Review of Systems   Respiratory: Negative for hemoptysis.    Musculoskeletal: Positive for joint pain.   All other systems reviewed and are negative.        Physical Exam   /64   Ht 1.676 m (5' 6\")   Wt 83.5 kg (184 lb)   BMI 29.70 kg/m    Constitutional:well-developed, well-nourished, and in no distress.   Cardiovascular: Intact distal pulses.    Neurological: alert. Gait Normal:   Gait, station, stance, and balance appear normal for age  Skin: Skin is warm and dry.   Psychiatric: Mood and affect normal.   Respiratory: unlabored, speaks in full sentences  Lymph: no LAD, no lymphangitis            Left Elbow Exam     Tenderness   The patient is experiencing tenderness in the medial epicondyle.     Range of Motion   Extension: normal   Flexion: normal   Pronation: normal   Supination: normal     Muscle Strength   Pronation:  5/5   Supination:  5/5     Tests   Varus: negative  Valgus: negative  Tinel's sign (cubital tunnel): negative    Other   Erythema: absent  Scars: absent  Sensation: normal  Pulse: present    Comments:  Mild pain with resisted wrist flexion with elbow extended             ASSESSMENT/PLAN    ICD-10-CM    1. Medial epicondylitis of elbow, left M77.02      Nature of injury discussed noting degenerative nature of these injuries as opposed to inflammatory conditions.  Treatment options reviewed including continued rest, therapy either home program or formal hand therapy referral, and injection of cortisone or irritant therapy protocols.    Noted that PRP and autologous blood injections are considered investigational and not covered by health plans. NG patches can be helpful but may take several months to be effective. Cortisone injections are commonly done, and can " be effective though are at risk for relapse, but pain control often valuable to many patients.     He opted for home program of eccentric strengthening and a detailed home stretching program was given to him today.  May call for referral to hand therapy if desired.

## 2019-12-06 ENCOUNTER — OFFICE VISIT (OUTPATIENT)
Dept: ORTHOPEDICS | Facility: CLINIC | Age: 29
End: 2019-12-06
Payer: COMMERCIAL

## 2019-12-06 VITALS
WEIGHT: 184 LBS | BODY MASS INDEX: 29.57 KG/M2 | SYSTOLIC BLOOD PRESSURE: 100 MMHG | HEIGHT: 66 IN | DIASTOLIC BLOOD PRESSURE: 64 MMHG

## 2019-12-06 DIAGNOSIS — M77.02 MEDIAL EPICONDYLITIS OF ELBOW, LEFT: Primary | ICD-10-CM

## 2019-12-06 PROCEDURE — 99203 OFFICE O/P NEW LOW 30 MIN: CPT | Performed by: FAMILY MEDICINE

## 2019-12-06 ASSESSMENT — MIFFLIN-ST. JEOR: SCORE: 1747.37

## 2019-12-06 ASSESSMENT — ENCOUNTER SYMPTOMS: HEMOPTYSIS: 0

## 2019-12-06 NOTE — LETTER
12/6/2019         RE: Cinda Peterson  [address] [place] [address]        Dear Colleague,    Thank you for referring your patient, Cinda Peterson, to the [facility]. Please see a copy of my visit note below.    HPI     [facility]   Clinic Visit s Dec 6, 2019    PCP: No Ref-Primary, Physician      Cinda is a 29 year old male who is seen as self referral for   Chief Complaint   Patient presents with     Left Elbow - Pain       Injury: Reports insidious onset without acute precipitating event.     Right hand dominant    Location of Pain: left elbow medial, nonradiating   Duration of Pain: acute, 2 month(s),   Rating of Pain at worst: 6/10  Rating of Pain Currently: 3/10  Pain is better with: activity avoidance and rest   Pain is worse with: pushing  Treatment so far consists of: activity avoidance  Associated symptoms: no distal numbness or tingling; denies swelling or warmth  Recent imaging completed: No recent imaging completed.  Prior History of related problems: none    Social History: is employed as a/an [place] [employer]      Past Medical History:   Diagnosis Date     Adult ADHD      H/O vitamin D deficiency         Patient Active Problem List    Diagnosis Date Noted     TB lung, latent 09/23/2019     Priority: Medium     Mild intermittent asthma without complication 04/30/2018     Priority: Medium     H/O vitamin D deficiency      Priority: Medium     Attention deficit hyperactivity disorder (ADHD), predominantly inattentive type      Priority: Medium       Family History   Problem Relation Age of Onset     No Known Problems Mother      No Known Problems Father      No Known Problems Maternal Grandmother        Social History     Socioeconomic History     Marital status: Single     Spouse name: Not on file     Number of children: Not on file     Years of education: Not on file     Highest education level: Not on  "file   Occupational History     Not on file   Social Needs     Financial resource strain: Not on file     Food insecurity:     Worry: Not on file     Inability: Not on file     Transportation needs:     Medical: Not on file     Non-medical: Not on file   Tobacco Use     Smoking status: Never Smoker     Smokeless tobacco: Never Used   Substance and Sexual Activity     Alcohol use: No     Alcohol/week: 0.0 standard drinks     Comment: quit drinking     Drug use: No     PAST SURGICAL HISTORY  No surgeries reported.       Review of Systems   Respiratory: Negative for hemoptysis.    Musculoskeletal: Positive for joint pain.   All other systems reviewed and are negative.        Physical Exam   /64   Ht 1.676 m (5' 6\")   Wt 83.5 kg (184 lb)   BMI 29.70 kg/m     Constitutional:well-developed, well-nourished, and in no distress.   Cardiovascular: Intact distal pulses.    Neurological: alert. Gait Normal:   Gait, station, stance, and balance appear normal for age  Skin: Skin is warm and dry.   Psychiatric: Mood and affect normal.   Respiratory: unlabored, speaks in full sentences  Lymph: no LAD, no lymphangitis            Left Elbow Exam     Tenderness   The patient is experiencing tenderness in the medial epicondyle.     Range of Motion   Extension: normal   Flexion: normal   Pronation: normal   Supination: normal     Muscle Strength   Pronation:  5/5   Supination:  5/5     Tests   Varus: negative  Valgus: negative  Tinel's sign (cubital tunnel): negative    Other   Erythema: absent  Scars: absent  Sensation: normal  Pulse: present    Comments:  Mild pain with resisted wrist flexion with elbow extended             ASSESSMENT/PLAN    ICD-10-CM    1. Medial epicondylitis of elbow, left M77.02      Nature of injury discussed noting degenerative nature of these injuries as opposed to inflammatory conditions.  Treatment options reviewed including continued rest, therapy either home program or formal hand therapy " referral, and injection of cortisone or irritant therapy protocols.    Noted that PRP and autologous blood injections are considered investigational and not covered by health plans. NG patches can be helpful but may take several months to be effective. Cortisone injections are commonly done, and can be effective though are at risk for relapse, but pain control often valuable to many patients.     He opted for home program of eccentric strengthening and a detailed home stretching program was given to him today.  May call for referral to hand therapy if desired.      Again, thank you for allowing me to participate in the care of your patient.        Sincerely,        Baltazar Anderson MD
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
## 2019-12-27 ENCOUNTER — OFFICE VISIT (OUTPATIENT)
Dept: FAMILY MEDICINE | Facility: CLINIC | Age: 29
End: 2019-12-27
Payer: COMMERCIAL

## 2019-12-27 VITALS
TEMPERATURE: 97.8 F | DIASTOLIC BLOOD PRESSURE: 54 MMHG | HEART RATE: 77 BPM | WEIGHT: 184 LBS | SYSTOLIC BLOOD PRESSURE: 108 MMHG | RESPIRATION RATE: 16 BRPM | BODY MASS INDEX: 29.7 KG/M2 | OXYGEN SATURATION: 100 %

## 2019-12-27 DIAGNOSIS — F90.0 ATTENTION DEFICIT HYPERACTIVITY DISORDER (ADHD), PREDOMINANTLY INATTENTIVE TYPE: ICD-10-CM

## 2019-12-27 DIAGNOSIS — M79.672 BILATERAL FOOT PAIN: Primary | ICD-10-CM

## 2019-12-27 DIAGNOSIS — G47.9 SLEEP DISORDER: ICD-10-CM

## 2019-12-27 DIAGNOSIS — M79.671 BILATERAL FOOT PAIN: Primary | ICD-10-CM

## 2019-12-27 DIAGNOSIS — Z71.89 COUNSELING ON HEALTH PROMOTION AND DISEASE PREVENTION: ICD-10-CM

## 2019-12-27 DIAGNOSIS — M77.02 MEDIAL EPICONDYLITIS OF ELBOW, LEFT: ICD-10-CM

## 2019-12-27 PROCEDURE — 99213 OFFICE O/P EST LOW 20 MIN: CPT | Performed by: NURSE PRACTITIONER

## 2019-12-27 RX ORDER — DEXTROAMPHETAMINE SACCHARATE, AMPHETAMINE ASPARTATE MONOHYDRATE, DEXTROAMPHETAMINE SULFATE AND AMPHETAMINE SULFATE 5; 5; 5; 5 MG/1; MG/1; MG/1; MG/1
20 CAPSULE, EXTENDED RELEASE ORAL EVERY MORNING
Qty: 30 CAPSULE | Refills: 0 | Status: SHIPPED | OUTPATIENT
Start: 2019-12-27 | End: 2020-02-03

## 2019-12-27 NOTE — PATIENT INSTRUCTIONS
For plantar fascitis avoid going barefoot even at home going to the bathroom at night.  Wear athletic shoes with good support, consider superfeet inserts.  If not improving consider seeing podiatry    We will check [facility] regarding adderall Rx, should have one for December.  Will call you later today.  with with Dr. Michael for additional refills.  You need to see one consistent provider for refills

## 2019-12-30 ENCOUNTER — MYC REFILL (OUTPATIENT)
Dept: FAMILY MEDICINE | Facility: CLINIC | Age: 29
End: 2019-12-30

## 2019-12-30 DIAGNOSIS — J45.20 MILD INTERMITTENT ASTHMA WITHOUT COMPLICATION: ICD-10-CM

## 2019-12-30 RX ORDER — FLUTICASONE PROPIONATE 44 UG/1
1 AEROSOL, METERED RESPIRATORY (INHALATION) 2 TIMES DAILY
Qty: 1 INHALER | Refills: 3 | Status: CANCELLED | OUTPATIENT
Start: 2019-12-30

## 2019-12-31 RX ORDER — ALBUTEROL SULFATE 90 UG/1
2 AEROSOL, METERED RESPIRATORY (INHALATION) 4 TIMES DAILY
Qty: 18 G | Refills: 3 | Status: SHIPPED | OUTPATIENT
Start: 2019-12-31 | End: 2021-04-09

## 2019-12-31 NOTE — TELEPHONE ENCOUNTER
"Requested Prescriptions   Pending Prescriptions Disp Refills     albuterol (VENTOLIN HFA) 108 (90 Base) MCG/ACT inhaler 18 g 3     Sig: Inhale 2 puffs into the lungs 4 times daily         Last Written Prescription Date:  9/23/19  Last Fill Quantity: 18g,   # refills: 3  Last Office Visit: 12/27/19  Future Office visit:       Routing refill request to provider for review/approval because:  Use of >6 canisters per year      Asthma Maintenance Inhalers - Anticholinergics Passed - 12/30/2019  5:56 PM        Passed - Patient is age 12 years or older        Passed - Asthma control assessment score within normal limits in last 6 months     Please review ACT score.     ACT Total Scores 2/28/2019 4/8/2019 9/23/2019   ACT TOTAL SCORE (Goal Greater than or Equal to 20) 19 25 20   In the past 12 months, how many times did you visit the emergency room for your asthma without being admitted to the hospital? 0 0 0   In the past 12 months, how many times were you hospitalized overnight because of your asthma? 0 0 0               Passed - Medication is active on med list        Passed - Recent (6 mo) or future (30 days) visit within the authorizing provider's specialty     Patient had office visit in the last 6 months or has a visit in the next 30 days with authorizing provider or within the authorizing provider's specialty.  See \"Patient Info\" tab in inbasket, or \"Choose Columns\" in Meds & Orders section of the refill encounter.            fluticasone (FLOVENT HFA) 44 MCG/ACT inhaler 1 Inhaler 3     Sig: Inhale 1 puff into the lungs 2 times daily   duplicate    Inhaled Steroids Protocol Passed - 12/30/2019  5:56 PM        Passed - Patient is age 12 or older        Passed - Asthma control assessment score within normal limits in last 6 months     Please review ACT score.           Passed - Medication is active on med list        Passed - Recent (6 mo) or future (30 days) visit within the authorizing provider's specialty     Patient " "had office visit in the last 6 months or has a visit in the next 30 days with authorizing provider or within the authorizing provider's specialty.  See \"Patient Info\" tab in inbasket, or \"Choose Columns\" in Meds & Orders section of the refill encounter.              "

## 2020-01-06 ENCOUNTER — TELEPHONE (OUTPATIENT)
Dept: FAMILY MEDICINE | Facility: CLINIC | Age: 30
End: 2020-01-06

## 2020-01-06 NOTE — TELEPHONE ENCOUNTER
Reason for Call:  Other call back    Detailed comments:Ashlyn from [facility] states they need the pt referral faxed to [phone]    Phone Number Patient can be reached at: Other phone number:  [phone]    Best Time: asap    Can we leave a detailed message on this number? YES    Call taken on 1/6/2020 at 7:31 AM by Clari Alonso

## 2020-01-06 NOTE — TELEPHONE ENCOUNTER
Reason for Call:  Other call back    Detailed comments: Ashlyn from [facility]  States they need PT referral faxed to [phone]    Phone Number Patient can be reached at: Other phone number:  [phone]    Best Time: asap    Can we leave a detailed message on this number? YES    Call taken on 1/6/2020 at 7:36 AM by Clari Alonso

## 2020-01-06 NOTE — TELEPHONE ENCOUNTER
Harriet Norwood-please advise if patient can be referred to [facility] for Physical Therapy?    DEJAN referral ordered on 10/29/19.    Thank you!  MARCIO BarnesN, RN

## 2020-01-06 NOTE — TELEPHONE ENCOUNTER
"Reason for Call:  Other {reason for call:[id number]}    Detailed comments: ***    Phone Number Patient can be reached at: {PHONE:[phone]}    Best Time: ***    Can we leave a detailed message on this number? { :[id number]::\"YES\"}    Call taken on 1/6/2020 at 7:30 AM by Clari Alonso      "

## 2020-01-08 NOTE — TELEPHONE ENCOUNTER
[facility] now has five medical plans to choose from and [facility] is in network under [facility]. Therefore, Pt can be seen at [facility] for PT and also spoke with Ashlyn and they have the PT Order from Dr. Michael. No further action is needed.    DOTTIE Kidd [facility] Referral Rep

## 2020-01-13 ENCOUNTER — TRANSFERRED RECORDS (OUTPATIENT)
Dept: HEALTH INFORMATION MANAGEMENT | Facility: CLINIC | Age: 30
End: 2020-01-13

## 2020-02-03 ENCOUNTER — OFFICE VISIT (OUTPATIENT)
Dept: FAMILY MEDICINE | Facility: CLINIC | Age: 30
End: 2020-02-03
Payer: COMMERCIAL

## 2020-02-03 VITALS
DIASTOLIC BLOOD PRESSURE: 66 MMHG | RESPIRATION RATE: 14 BRPM | SYSTOLIC BLOOD PRESSURE: 110 MMHG | OXYGEN SATURATION: 95 % | WEIGHT: 185.25 LBS | TEMPERATURE: 97.8 F | HEART RATE: 83 BPM | BODY MASS INDEX: 29.9 KG/M2

## 2020-02-03 DIAGNOSIS — F90.0 ATTENTION DEFICIT HYPERACTIVITY DISORDER (ADHD), PREDOMINANTLY INATTENTIVE TYPE: ICD-10-CM

## 2020-02-03 DIAGNOSIS — J06.9 UPPER RESPIRATORY TRACT INFECTION, UNSPECIFIED TYPE: Primary | ICD-10-CM

## 2020-02-03 DIAGNOSIS — M77.02 MEDIAL EPICONDYLITIS OF ELBOW, LEFT: ICD-10-CM

## 2020-02-03 LAB
FLUAV+FLUBV AG SPEC QL: NEGATIVE
FLUAV+FLUBV AG SPEC QL: NEGATIVE
SPECIMEN SOURCE: NORMAL

## 2020-02-03 PROCEDURE — 87804 INFLUENZA ASSAY W/OPTIC: CPT | Mod: 59 | Performed by: FAMILY MEDICINE

## 2020-02-03 PROCEDURE — 99214 OFFICE O/P EST MOD 30 MIN: CPT | Performed by: FAMILY MEDICINE

## 2020-02-03 RX ORDER — DEXTROAMPHETAMINE SACCHARATE, AMPHETAMINE ASPARTATE MONOHYDRATE, DEXTROAMPHETAMINE SULFATE AND AMPHETAMINE SULFATE 5; 5; 5; 5 MG/1; MG/1; MG/1; MG/1
20 CAPSULE, EXTENDED RELEASE ORAL EVERY MORNING
Qty: 30 CAPSULE | Refills: 0 | Status: SHIPPED | OUTPATIENT
Start: 2020-03-03 | End: 2020-02-03

## 2020-02-03 RX ORDER — DEXTROAMPHETAMINE SACCHARATE, AMPHETAMINE ASPARTATE MONOHYDRATE, DEXTROAMPHETAMINE SULFATE AND AMPHETAMINE SULFATE 5; 5; 5; 5 MG/1; MG/1; MG/1; MG/1
20 CAPSULE, EXTENDED RELEASE ORAL EVERY MORNING
Qty: 30 CAPSULE | Refills: 0 | Status: SHIPPED | OUTPATIENT
Start: 2020-02-03 | End: 2020-02-03

## 2020-02-03 RX ORDER — DEXTROAMPHETAMINE SACCHARATE, AMPHETAMINE ASPARTATE MONOHYDRATE, DEXTROAMPHETAMINE SULFATE AND AMPHETAMINE SULFATE 5; 5; 5; 5 MG/1; MG/1; MG/1; MG/1
20 CAPSULE, EXTENDED RELEASE ORAL EVERY MORNING
Qty: 30 CAPSULE | Refills: 0 | Status: SHIPPED | OUTPATIENT
Start: 2020-04-02 | End: 2020-06-16 | Stop reason: DRUGHIGH

## 2020-02-03 NOTE — PROGRESS NOTES
Subjective     iCnda Peterson is a 29 year old male who presents to clinic today for the following health issues:    HPI   RESPIRATORY SYMPTOMS      Duration: 6 days     Description  nasal congestion, sore throat and fatigue/malaise    Severity: mild    Accompanying signs and symptoms: None    History (predisposing factors):  None hx of asthma    Precipitating or alleviating factors:  Sleeping helps    Therapies tried and outcome:  rest and fluids  Zinc and vitamins C    Right now - feeling OK.   Feeling somewhat tired.   Cut down caffeine.   Nose is still stuffed up.   Asthma is doing OK.   Get sick a lot - sore throat and got sick last Nov.   Currently albuterol use 1-2 times per week. flovent daily.     Been to sports medicine doctor.  Doing home exercises for his elbow but pain is still there.  Not as severe but not going away.  Wondering about dry needle technique.  He has read about it.    Would like to get ADHD medication refill.  Extended release  medications are not working as well as in the release but is willing to continue the same.    Social History     Occupational History     Not on file   Tobacco Use     Smoking status: Never Smoker     Smokeless tobacco: Never Used   Substance and Sexual Activity     Alcohol use: No     Alcohol/week: 0.0 standard drinks     Comment: quit drinking     Drug use: No     Sexual activity: Yes     Partners: Female     Birth control/protection: Condom     No Known Allergies  Patient Active Problem List   Diagnosis     H/O vitamin D deficiency     Attention deficit hyperactivity disorder (ADHD), predominantly inattentive type     Mild intermittent asthma without complication     TB lung, latent     Reviewed medications, social history and  past medical and surgical history.    Review of system: for general, respiratory, CVS, GI and psychiatry negative except for noted above.     EXAM:  /66 (BP Location: Right arm, Patient Position: Chair, Cuff Size: Adult  Regular)   Pulse 83   Temp 97.8  F (36.6  C) (Oral)   Resp 14   Wt 84 kg (185 lb 4 oz)   SpO2 95%   BMI 29.90 kg/m    Constitutional: healthy, alert and no distress   Psychiatric: mentation appears normal and affect normal/bright  Cardiovascular: RRR. No murmurs,  Respiratory: negative, Lungs clear. No crackles or wheezing. No tachypnea.        ASSESSMENT / PLAN:  (J06.9) Upper respiratory tract infection, unspecified type  (primary encounter diagnosis)  Comment: With his history of asthma we have ruled out influenza.  His symptoms most likely were started with influenza but currently feeling much better.  I strongly recommended him to continue to avoid stressors for his asthma.   Plan: Influenza A/B antigen             (F90.0) Attention deficit hyperactivity disorder (ADHD), predominantly inattentive type  Comment:  Patient is tolerating current medication without any major side effects of concerns and current dose seems reasonable too.  Current medication regime is effective. Continue current treatment without any changes.   Plan: amphetamine-dextroamphetamine (ADDERALL XR) 20         MG 24 hr capsule, DISCONTINUED:         amphetamine-dextroamphetamine (ADDERALL XR) 20         MG 24 hr capsule, DISCONTINUED:         amphetamine-dextroamphetamine (ADDERALL XR) 20         MG 24 hr capsule             (M77.02) Medial epicondylitis of elbow, left  Comment:    Plan: Symptoms are persistent.  We reviewed his previous sports medicine doctor's note and we recommend he should follow-up with them.   .         The above note was dictated using voice recognition. Although reviewed after completion, some word and grammatical error may remain .

## 2020-02-04 ENCOUNTER — MYC MEDICAL ADVICE (OUTPATIENT)
Dept: ORTHOPEDICS | Facility: CLINIC | Age: 30
End: 2020-02-04

## 2020-02-04 ASSESSMENT — ASTHMA QUESTIONNAIRES: ACT_TOTALSCORE: 20

## 2020-02-10 ENCOUNTER — TRANSFERRED RECORDS (OUTPATIENT)
Dept: HEALTH INFORMATION MANAGEMENT | Facility: CLINIC | Age: 30
End: 2020-02-10

## 2020-02-10 ENCOUNTER — OFFICE VISIT (OUTPATIENT)
Dept: ORTHOPEDICS | Facility: CLINIC | Age: 30
End: 2020-02-10
Payer: COMMERCIAL

## 2020-02-10 VITALS
WEIGHT: 185 LBS | BODY MASS INDEX: 29.73 KG/M2 | HEIGHT: 66 IN | SYSTOLIC BLOOD PRESSURE: 114 MMHG | DIASTOLIC BLOOD PRESSURE: 68 MMHG

## 2020-02-10 DIAGNOSIS — M77.02 MEDIAL EPICONDYLITIS OF ELBOW, LEFT: Primary | ICD-10-CM

## 2020-02-10 PROCEDURE — 99214 OFFICE O/P EST MOD 30 MIN: CPT | Performed by: FAMILY MEDICINE

## 2020-02-10 ASSESSMENT — MIFFLIN-ST. JEOR: SCORE: 1751.9

## 2020-02-10 NOTE — PATIENT INSTRUCTIONS
Diagnosis: Medial epicondylitis (Golfer's Elbow)  Image Findings: none  Treatment: Continue physical therapy call for PRP vs CAIN injection  Job: No restrictions  Medications: Limited tylenol/ibuprofen for pain for 1-2 weeks  Follow-up: As needed    It was great seeing you today!    Barry Alvarez

## 2020-02-10 NOTE — LETTER
2/10/2020         RE: Cinda Peterson  [address] [place] [address]        Dear Colleague,    Thank you for referring your patient, Cinda Peterson, to the [facility]. Please see a copy of my visit note below.    ASSESSMENT & PLAN  There are no diagnoses linked to this encounter.  Patient is a 29 year old male presenting for evaluation of   Chief Complaint   Patient presents with     Left Elbow - Pain      # Left Medial Epicondylitis: Notable over the past 4 months in the setting of being a free weight  with no inciting injury.  No sig TTP over medial epicondyle but pain with resisted pronation and wrist flexion.  Counseled patient on nature of condition and treatment options.  Given this plan as below, follow-up as needed  Treatment: Activities as tolerated, follow-up with hand surgery as scheduled  Physical Therapy Cont HEP, can go to PT for ART   Injection Can consider CAIN vs PRP per patient preference  Medications  Limited NSAIDs/Tylenol    Concerning signs/sx that would warrant urgent evaluation were discussed.  All questions were answered, patient understands and agrees with plan.      Return if symptoms worsen or fail to improve.    -----    SUBJECTIVE  Cinda Peterson is a/an 29 year old Right handed male who is seen as a self referral for evaluation of left elbow pain. The patient is seen by themselves.    Onset: 10/1/20, 4 month(s) ago. Patient describes injury as weight training.  Saw Dr. Anderson 12/6/19.  Sx not too flared currently.   Location of Pain: left medial elbow   Rating of Pain at worst: 9/10  Rating of Pain Currently: 5/10  Worsened by: wide  bench press, pulling, weight bearing, massage thearpist, chiropractor.  Pain in the AM.    Better with: nothing   Treatments tried: stretching, home exercises.   Associated symptoms: no distal numbness or tingling; denies swelling or warmth  Orthopedic history:  NO  Relevant surgical history: NO  Past Medical History:   Diagnosis Date     Adult ADHD      H/O vitamin D deficiency      Social History     Socioeconomic History     Marital status: Single     Spouse name: None     Number of children: None     Years of education: None     Highest education level: None   Occupational History     None   Social Needs     Financial resource strain: None     Food insecurity:     Worry: None     Inability: None     Transportation needs:     Medical: None     Non-medical: None   Tobacco Use     Smoking status: Never Smoker     Smokeless tobacco: Never Used   Substance and Sexual Activity     Alcohol use: No     Alcohol/week: 0.0 standard drinks     Comment: quit drinking     Drug use: No     Sexual activity: Yes     Partners: Female     Birth control/protection: Condom   Lifestyle     Physical activity:     Days per week: None     Minutes per session: None     Stress: None   Relationships     Social connections:     Talks on phone: None     Gets together: None     Attends Tenriism service: None     Active member of club or organization: None     Attends meetings of clubs or organizations: None     Relationship status: None     Intimate partner violence:     Fear of current or ex partner: None     Emotionally abused: None     Physically abused: None     Forced sexual activity: None   Other Topics Concern     Parent/sibling w/ CABG, MI or angioplasty before 65F 55M? Not Asked   Social History Narrative     None       Patient's past medical, surgical, social, and family histories were reviewed today and no changes are noted.  No family history pertinent to the patient's problem today    REVIEW OF SYSTEMS:  10 point ROS is negative other than symptoms noted above in HPI, Past Medical History or as stated below  Constitutional: NEGATIVE for fever, chills, change in weight  Skin: NEGATIVE for worrisome rashes, moles or lesions  GI/: NEGATIVE for bowel or bladder changes  Neuro: NEGATIVE  "for weakness, dizziness or paresthesias    OBJECTIVE:  /68   Ht 1.676 m (5' 6\")   Wt 83.9 kg (185 lb)   BMI 29.86 kg/m      General: healthy, alert and in no distress  HEENT: no scleral icterus or conjunctival erythema  Skin: no suspicious lesions or rash. No jaundice.  CV: regular rhythm by palpation  Resp: normal respiratory effort without conversational dyspnea   Psych: normal mood and affect  Gait: normal steady gait with appropriate coordination and balance  Neuro: Normal sensory exam of bilateral hands.   MSK:  LEFT ELBOW  Inspection:    No swelling, bruising, discoloration, or obvious deformity or asymmetry  Palpation:  Nontender.    Crepitus is Absent  Range of Motion:     Extension full / flexion full / pronation full / supination full  Strength:    Flexion full extension full pronation full supination full  Special Tests:    Positive: pain with resisted wrist flexion and pronation    Negative: Pain with resisted wrist extension, pain with resisted middle finger extension, pain with resisted supination, passive valgus stress, milking manuever, varus stress, cubital tunnel (ulnar Tinel's)    Independent visualization of the below image:  No results found for this or any previous visit (from the past 24 hour(s)).      Patient's conditions were thoroughly discussed during today's visit with greater than 50% of the visit spent counseling the patient with total time spent face-to-face with the patient being 30 minutes.    Barry Alvarez MD, [facility]        Again, thank you for allowing me to participate in the care of your patient.        Sincerely,        Barry Alvarez MD    "

## 2020-02-10 NOTE — PROGRESS NOTES
ASSESSMENT & PLAN  There are no diagnoses linked to this encounter.  Patient is a 29 year old male presenting for evaluation of   Chief Complaint   Patient presents with     Left Elbow - Pain      # Left Medial Epicondylitis: Notable over the past 4 months in the setting of being a free weight  with no inciting injury.  No sig TTP over medial epicondyle but pain with resisted pronation and wrist flexion.  Counseled patient on nature of condition and treatment options.  Given this plan as below, follow-up as needed  Treatment: Activities as tolerated, follow-up with hand surgery as scheduled  Physical Therapy Cont HEP, can go to PT for ART   Injection Can consider CAIN vs PRP per patient preference  Medications  Limited NSAIDs/Tylenol    Concerning signs/sx that would warrant urgent evaluation were discussed.  All questions were answered, patient understands and agrees with plan.      Return if symptoms worsen or fail to improve.    -----    SUBJECTIVE  Cinda Peterson is a/an 29 year old Right handed male who is seen as a self referral for evaluation of left elbow pain. The patient is seen by themselves.    Onset: 10/1/20, 4 month(s) ago. Patient describes injury as weight training.  Saw Dr. Anderson 12/6/19.  Sx not too flared currently.   Location of Pain: left medial elbow   Rating of Pain at worst: 9/10  Rating of Pain Currently: 5/10  Worsened by: wide  bench press, pulling, weight bearing, massage thearpist, chiropractor.  Pain in the AM.    Better with: nothing   Treatments tried: stretching, home exercises.   Associated symptoms: no distal numbness or tingling; denies swelling or warmth  Orthopedic history: NO  Relevant surgical history: NO  Past Medical History:   Diagnosis Date     Adult ADHD      H/O vitamin D deficiency      Social History     Socioeconomic History     Marital status: Single     Spouse name: None     Number of children: None     Years of education: None     Highest  "education level: None   Occupational History     None   Social Needs     Financial resource strain: None     Food insecurity:     Worry: None     Inability: None     Transportation needs:     Medical: None     Non-medical: None   Tobacco Use     Smoking status: Never Smoker     Smokeless tobacco: Never Used   Substance and Sexual Activity     Alcohol use: No     Alcohol/week: 0.0 standard drinks     Comment: quit drinking     Drug use: No     Sexual activity: Yes     Partners: Female     Birth control/protection: Condom   Lifestyle     Physical activity:     Days per week: None     Minutes per session: None     Stress: None   Relationships     Social connections:     Talks on phone: None     Gets together: None     Attends Hoahaoism service: None     Active member of club or organization: None     Attends meetings of clubs or organizations: None     Relationship status: None     Intimate partner violence:     Fear of current or ex partner: None     Emotionally abused: None     Physically abused: None     Forced sexual activity: None   Other Topics Concern     Parent/sibling w/ CABG, MI or angioplasty before 65F 55M? Not Asked   Social History Narrative     None       Patient's past medical, surgical, social, and family histories were reviewed today and no changes are noted.  No family history pertinent to the patient's problem today    REVIEW OF SYSTEMS:  10 point ROS is negative other than symptoms noted above in HPI, Past Medical History or as stated below  Constitutional: NEGATIVE for fever, chills, change in weight  Skin: NEGATIVE for worrisome rashes, moles or lesions  GI/: NEGATIVE for bowel or bladder changes  Neuro: NEGATIVE for weakness, dizziness or paresthesias    OBJECTIVE:  /68   Ht 1.676 m (5' 6\")   Wt 83.9 kg (185 lb)   BMI 29.86 kg/m     General: healthy, alert and in no distress  HEENT: no scleral icterus or conjunctival erythema  Skin: no suspicious lesions or rash. No jaundice.  CV: " regular rhythm by palpation  Resp: normal respiratory effort without conversational dyspnea   Psych: normal mood and affect  Gait: normal steady gait with appropriate coordination and balance  Neuro: Normal sensory exam of bilateral hands.   MSK:  LEFT ELBOW  Inspection:    No swelling, bruising, discoloration, or obvious deformity or asymmetry  Palpation:  Nontender.    Crepitus is Absent  Range of Motion:     Extension full / flexion full / pronation full / supination full  Strength:    Flexion full extension full pronation full supination full  Special Tests:    Positive: pain with resisted wrist flexion and pronation    Negative: Pain with resisted wrist extension, pain with resisted middle finger extension, pain with resisted supination, passive valgus stress, milking manuever, varus stress, cubital tunnel (ulnar Tinel's)    Independent visualization of the below image:  No results found for this or any previous visit (from the past 24 hour(s)).      Patient's conditions were thoroughly discussed during today's visit with greater than 50% of the visit spent counseling the patient with total time spent face-to-face with the patient being 30 minutes.    Barry Alvarez MD, [facility]

## 2020-02-12 ENCOUNTER — MYC MEDICAL ADVICE (OUTPATIENT)
Dept: ORTHOPEDICS | Facility: CLINIC | Age: 30
End: 2020-02-12

## 2020-02-12 ENCOUNTER — THERAPY VISIT (OUTPATIENT)
Dept: PHYSICAL THERAPY | Facility: CLINIC | Age: 30
End: 2020-02-12
Payer: COMMERCIAL

## 2020-02-12 DIAGNOSIS — M25.522 LEFT ELBOW PAIN: ICD-10-CM

## 2020-02-12 DIAGNOSIS — M77.02 MEDIAL EPICONDYLITIS OF ELBOW, LEFT: ICD-10-CM

## 2020-02-12 PROCEDURE — 97110 THERAPEUTIC EXERCISES: CPT | Mod: GP | Performed by: PHYSICAL THERAPIST

## 2020-02-12 PROCEDURE — 97161 PT EVAL LOW COMPLEX 20 MIN: CPT | Mod: GP | Performed by: PHYSICAL THERAPIST

## 2020-02-12 NOTE — PROGRESS NOTES
[facility] Initial Evaluation  Subjective:  Patient reports onset of sharp medial left elbow pain October 2019 that he relates to weight training doing bench press or rowing.  He has had ongoing pain and soreness that is limiting his gripping, reaching, lifting, and driving.  He reports doing treatment including described [place] type technique, stretching, ice, all without significant functional change.  Patient is right-handed.      Patient Entered Health History - See Therapist Evaluation below  Cinda Petesron being seen for Golfers elbow.     Problem began: 10/1/2019.   Problem occurred: I think it was benching  and reported as 5/10 on pain scale.  General health as reported by patient is good.  Pertinent medical history includes: asthma.        Surgeries include:  None.                             Physical Exam                    Objective:  Standing Alignment:    Cervical/Thoracic:  Flat thoracic upper spine and forward head              General deviations alignment: head position with slight right SB in sitting and standing.                      Cervical/Thoracic Evaluation    AROM:  AROM Cervical:    Flexion:          Extension:       Mod loss  Rotation:         Left: mod loss     Right: min loss  Side Bend:      Left:     Right:                 Cervical Palpation:    Tenderness present at Left:    Rhomboids; Upper Trap; Levator and Erector Spinae        Spinal Segmental Conclusions:    Level:  Hypo at T5, T4, T3, T2, T1 and C7             Shoulder Evaluation:  ROM:    PROM:    Flexion:  Left:  158              Internal Rotation:  Left:  65 firm end feel     Right:  80  External Rotation:  Left:  85                                     ROM:    PROM:      Flexion Elbow:  Left: min loss  Extension Elbow: Left: min loss              Pain: end range supination ext L elbow,   Endfeel: springy end feel L supination min loss , altered end feel L elbow soft tissue stretch   Strength:         Flexion Wrist:  Left:/5 strong/painful Pain:      Extension Wrist: Left:/5 strong/painful Pain:    Supination Elbow/Wrist: Left:/5 strong/painful Pain:      Pronation Elbow/Wrist: Left:/5 strong/painful Pain:            Ulnar Deviation: Left:/5 strong/painful Pain:          Palpation:    Left wrist/elbow tenderness present at: Medial Epicondyle; Pronator Teres; Wrist Flexors and Radial Head    Mobility:    Proximal Radioulnar:  Left: hypomobile                                           General     ROS    Assessment/Plan:    Patient is a 29 year old male with left side elbow complaints.    Patient has the following significant findings with corresponding treatment plan.                Diagnosis 1: Left medial epicondylitis Pain -  hot/cold therapy, manual therapy and self management  Decreased ROM/flexibility - manual therapy and therapeutic exercise  Decreased joint mobility - manual therapy and therapeutic exercise  Inflammation - cold therapy    Impaired muscle performance - neuro re-education  Decreased function - therapeutic activities  Impaired posture - neuro re-education    Therapy Evaluation Codes:   1) History comprised of:   Personal factors that impact the plan of care:      None.    Comorbidity factors that impact the plan of care are:      None.     Medications impacting care: None.  2) Examination of Body Systems comprised of:   Body structures and functions that impact the plan of care:      Cervical spine, Elbow, Shoulder and Thoracic Spine.   Activity limitations that impact the plan of care are:      Driving, Grasping, Lifting, Reading/Computer work and Sports.  3) Clinical presentation characteristics are:   Stable/Uncomplicated.  4) Decision-Making    Low complexity using standardized patient assessment instrument and/or measureable assessment of functional outcome.  Cumulative Therapy Evaluation is: Low complexity.    Previous and current functional limitations:  (See Goal Flow Sheet for  this information)    Short term and Long term goals: (See Goal Flow Sheet for this information)     Communication ability:  Patient appears to be able to clearly communicate and understand verbal and written communication and follow directions correctly.  Treatment Explanation - The following has been discussed with the patient:   RX ordered/plan of care  Anticipated outcomes  Possible risks and side effects  This patient would benefit from PT intervention to resume normal activities.   Rehab potential is excellent.    Frequency: 1 X week, once daily  Duration:  for 4 weeks  Discharge Plan:  Achieve all LTG.  Independent in home treatment program.  Reach maximal therapeutic benefit.    Please refer to the daily flowsheet for treatment today, total treatment time and time spent performing 1:1 timed codes.

## 2020-02-13 ENCOUNTER — TELEPHONE (OUTPATIENT)
Dept: ORTHOPEDICS | Facility: CLINIC | Age: 30
End: 2020-02-13

## 2020-02-13 NOTE — TELEPHONE ENCOUNTER
Patient LVM that he wants to schedule a PRP.     Please call to schedule    Rosa Maria Crocker ATC

## 2020-02-13 NOTE — TELEPHONE ENCOUNTER
----- Message from Fina Coulter sent at 2/12/2020  1:42 PM CST -----  Regarding: needs to schedule PRP injection-Antonio  Contact: [phone]  Patient would like to schedule a PRP injection w/Dr. Alvarez    Thank you!

## 2020-02-14 ENCOUNTER — TELEPHONE (OUTPATIENT)
Dept: ORTHOPEDICS | Facility: CLINIC | Age: 30
End: 2020-02-14

## 2020-02-14 NOTE — TELEPHONE ENCOUNTER
Cinda Peterson is a 29 year old male who left voicemail yesterday asking to set up a PRP injection with Dr. Petersen.   He saw Dr. Alvarez previously who recommended PRP.     Patient expecting call back: YES      Preferred contact number:  [phone] (home)    Can we leave a detailed message on this number: NO consent on file    Phone call to patient. He states he has tried therapy and all things recommended by Dr. Alvarez. He is considering PRP but wants to avoid it if possible. He is not sure if there are any other options. He asks if the PRP is the same cost as with Dr. Alvarez. Informed it is $450 up front.   Informed that a consultation is needed with Dr. Petersen prior to having a PRP. Inquired as to why he is not considering PRP with Dr. Alvarez. He states the [place] location is closer for him. He also states the [employer] website initially listed only Dr. Alvarez as doing PRP's. He was able to look at a separate site for Dr. Petersen and liked the information presented there.   Offered to schedule a consultation with Dr. Petersen. Was in the process of scheduling with Dr. Petersen for next week, when patient then said to cancel the appointment and he will schedule a PRP with Dr. Alvarez.   Appointment cancelled.     CJ Hemphill RN

## 2020-02-20 ENCOUNTER — THERAPY VISIT (OUTPATIENT)
Dept: PHYSICAL THERAPY | Facility: CLINIC | Age: 30
End: 2020-02-20
Payer: COMMERCIAL

## 2020-02-20 DIAGNOSIS — M25.522 LEFT ELBOW PAIN: Primary | ICD-10-CM

## 2020-02-20 PROCEDURE — 97140 MANUAL THERAPY 1/> REGIONS: CPT | Mod: GP | Performed by: PHYSICAL THERAPIST

## 2020-02-20 PROCEDURE — 97110 THERAPEUTIC EXERCISES: CPT | Mod: GP | Performed by: PHYSICAL THERAPIST

## 2020-02-27 ENCOUNTER — OFFICE VISIT (OUTPATIENT)
Dept: ORTHOPEDICS | Facility: CLINIC | Age: 30
End: 2020-02-27

## 2020-02-27 DIAGNOSIS — M77.02 MEDIAL EPICONDYLITIS OF ELBOW, LEFT: Primary | ICD-10-CM

## 2020-02-27 PROCEDURE — 0232T NJX PLATELET PLASMA: CPT | Performed by: FAMILY MEDICINE

## 2020-02-27 RX ORDER — HYDROCODONE BITARTRATE AND ACETAMINOPHEN 5; 325 MG/1; MG/1
1 TABLET ORAL EVERY 6 HOURS PRN
Qty: 12 TABLET | Refills: 0 | Status: SHIPPED | OUTPATIENT
Start: 2020-02-27 | End: 2020-05-12

## 2020-02-27 NOTE — PROGRESS NOTES
Cinda Peterson  :  1990  DOS: 2020  MRN: [id number]    Sports Medicine Clinic Procedure      Ultrasound Guided left PRP Injection      Cinda Peterson has elected to receive a left Elbow  medial epicondylitis PRP injection to help with modulation of pain and to promote healing. Sonographic guidance will be used to ensure accurate placement of the medication into the left common flexor tendon.      Diagnosis: (M77.02) Medial epicondylitis of elbow, left  (primary encounter diagnosis)  Comment: Left common flexor tendon PRP injection  Plan: HYDROcodone-acetaminophen (NORCO) 5-325 MG         tablet           PRP PROCEDURE:  The patient was prepped and approximately 120 cc of whole blood was withdrawn using a standard sterile technique via antecubiatal access of the arm. The whole blood was processed on location in the [facility] and approximately 5 cc of Platelet Rich Plasma was obtained.      Technique: The risks of the procedure were explained to the patient.  A consent was signed for the left medial common flexor tendon PRP injection.  The patient was evaluated with a [employer] ultrasound machine using a 12 MHz linear probe.       The left Elbow  was prepped and draped in a sterile manner.  Approximately 2 ml of 1% lidocaine without epinephrine was placed at injection site.  Ultrasound identification of the left medial common flexor tendon in both long and short axis.  Area blood vessels noted.  The probe was placed in a oblique-sagittal axis to the left Elbow  joint.  A 1.5 inch 25 gauge needle was placed under ultrasound guidance into the left Elbow .   A solution with total of volume of 5 cc PRP was injected into the left medial common flexor tendon in both long and short axis taking care to distribute the volume throughout all areas.  There was ultrasound documentation of needle placement and injection.        Impression:  Successful left medial common flexor  tendon PRP injection.      Plan:  Follow up prn   Will provide update in 1-2 and 4 weeks  Discussed potential next steps based on clinical progress  Advised will likely be sore over next 2-14 days, OTC and supportive care reviewed  All questions were answered today  Contact us with additional questions or concerns  Signs and sx of concern reviewed      Barry Alvarez MD CAQ, MD  Primary Care Sports Medicine  [facility]

## 2020-02-27 NOTE — PATIENT INSTRUCTIONS
PRP (Platelet Rich Plasma) Post-Procedure Instructions  1. Do not take anti-inflammatories (Motrin, Advil, Naprosyn, Naproxen, Aleve, Indocin, Mobic, Toradol, Voltaren, Arthrotec, Ibuprofen, Diclofenac) for 2 weeks after the procedure  2. You can take Tylenol up to 1000 mg / dose and no more than 3,000 mg / day  3. You will be given a prescription strength pain medication (Norco) to be used for severe pain.  No driving or alcohol while taking narcotics.  4. If pain is persistent after Tylenol and Norco you may apply ice to the affected area for 20 minutes. Limit icing within the first 2 weeks.  5. Plan to rest the remainder of the day after the procedure.  6. A follow-up appointment should be scheduled for 1 week after the procedure.  7. Formal physical therapy, if needed, will begin 1-2 weeks after the procedure.  8. It can take up to 6-8 weeks to determine your full response to the PRP injection    For elbow tendinopathy / tearing  1. You will be in a sling for 3 - 7 days. Gentle range of motion out of the sling is necessary / encouraged.  2. No lifting more than a glass of water for first 3 days, no more than a pot of coffee from 3- 7 days.    3. No strength training, hitting or tennis for at least 6-8 weeks    It was great seeing you again today!    Barry Alvarez

## 2020-02-27 NOTE — LETTER
February 27, 2020      Cinda Acrhersanto  [address] [place] [address]        To Whom It May Concern,      Patient was seen for left elbow injury and had a procedure done for his pain.  He should be in a sling over the next 2 weeks.  Please allow him to wear this during the day at work.            Sincerely,        Barry Alvarez MD

## 2020-02-27 NOTE — LETTER
2020         RE: Cinda Peterson  [address] [place] [address]        Dear Colleague,    Thank you for referring your patient, Cinda Peterson, to the [facility]. Please see a copy of my visit note below.    Cinda Peterson  :  1990  DOS: 2020  MRN: [id number]    Sports Medicine Clinic Procedure      Ultrasound Guided left PRP Injection      Cinda Peterson has elected to receive a  left Elbow   medial epicondylitis PRP injection to help with modulation of pain and to promote healing. Sonographic guidance will be used to ensure accurate placement of the medication into the left common flexor tendon.      Diagnosis: (M77.02) Medial epicondylitis of elbow, left  (primary encounter diagnosis)  Comment: Left common flexor tendon PRP injection  Plan: HYDROcodone-acetaminophen (NORCO) 5-325 MG         tablet           PRP PROCEDURE:  The patient was prepped and approximately 120 cc of whole blood was withdrawn using a standard sterile technique via antecubiatal access of the arm. The whole blood was processed on location in the [employer] [facility] and approximately 5 cc of Platelet Rich Plasma was obtained.      Technique: The risks of the procedure were explained to the patient.  A consent was signed for the left medial common flexor tendon PRP injection.  The patient was evaluated with a [employer] ultrasound machine using a 12 MHz linear probe.       The left Elbow  was prepped and draped in a sterile manner.   Approximately 2 ml of 1% lidocaine without epinephrine was placed at injection site.  Ultrasound identification of the left medial common flexor tendon in both long and short axis.  Area blood vessels noted.  The probe was placed in a oblique-sagittal axis to the left Elbow  joint.  A 1.5 inch  25 gauge needle was placed under ultrasound guidance into the left Elbow .   A solution with total of  volume of 5 cc PRP was injected into the left medial common flexor tendon in both long and short axis taking care to distribute the volume throughout all areas.  There was ultrasound documentation of needle placement and injection.        Impression:  Successful left medial common flexor tendon PRP injection.      Plan:  Follow up prn   Will provide update in 1-2 and 4 weeks  Discussed potential next steps based on clinical progress  Advised will likely be sore over next 2-14 days, OTC and supportive care reviewed  All questions were answered today  Contact us with additional questions or concerns  Signs and sx of concern reviewed      MD BARBIE Meeks MD  Primary Care Sports Medicine  [facility]       Again, thank you for allowing me to participate in the care of your patient.        Sincerely,        Barry Alvarez MD

## 2020-03-06 ENCOUNTER — MYC MEDICAL ADVICE (OUTPATIENT)
Dept: FAMILY MEDICINE | Facility: CLINIC | Age: 30
End: 2020-03-06

## 2020-03-10 ENCOUNTER — OFFICE VISIT (OUTPATIENT)
Dept: ORTHOPEDICS | Facility: CLINIC | Age: 30
End: 2020-03-10
Payer: COMMERCIAL

## 2020-03-10 VITALS
HEART RATE: 64 BPM | DIASTOLIC BLOOD PRESSURE: 71 MMHG | WEIGHT: 185 LBS | SYSTOLIC BLOOD PRESSURE: 111 MMHG | HEIGHT: 66 IN | BODY MASS INDEX: 29.73 KG/M2

## 2020-03-10 DIAGNOSIS — M77.02 MEDIAL EPICONDYLITIS OF ELBOW, LEFT: Primary | ICD-10-CM

## 2020-03-10 PROCEDURE — 99213 OFFICE O/P EST LOW 20 MIN: CPT | Performed by: FAMILY MEDICINE

## 2020-03-10 ASSESSMENT — MIFFLIN-ST. JEOR: SCORE: 1751.9

## 2020-03-10 NOTE — PATIENT INSTRUCTIONS
Diagnosis: Left Medial Epicondylitis  2 weeks out of PRP injecdtion  Treatment: Restart home exercises, gradually return to lifting over the next 1-2 months   Tenex may be an option if not improved    Follow-up: As needed    It was great seeing you today!    Barry Alvarez

## 2020-03-23 ENCOUNTER — MYC MEDICAL ADVICE (OUTPATIENT)
Dept: ORTHOPEDICS | Facility: CLINIC | Age: 30
End: 2020-03-23

## 2020-03-26 NOTE — TELEPHONE ENCOUNTER
Contacted pt regarding follow-up after PRP injection on 2/27/20.  Pt reporting still having some pain over medial epicondylitis.  Pt noting pain with push-ups.  Pt feels pain came back quickly.  He is concerned about it returning.  Counseled pt that PRP injections into tendon takes months for relief.  He can restart HEP and f/u if not improved after several mon, sooner if worsening.  Pt understands and agrees with plan.    Barry Alvarez MD

## 2020-04-09 ENCOUNTER — MYC MEDICAL ADVICE (OUTPATIENT)
Dept: FAMILY MEDICINE | Facility: CLINIC | Age: 30
End: 2020-04-09

## 2020-05-07 ENCOUNTER — MYC MEDICAL ADVICE (OUTPATIENT)
Dept: FAMILY MEDICINE | Facility: CLINIC | Age: 30
End: 2020-05-07

## 2020-05-07 NOTE — TELEPHONE ENCOUNTER
DR FIELD  Last office visit 2/20  Got rx for 3 months  2/20,3/20,4/20    Ok for refills?    Thanks!     Gill Reynaga RN

## 2020-05-08 NOTE — TELEPHONE ENCOUNTER
Controlled substance refill - needs some sort of visit.   Either video or phone visit.   Please let patient know and help him schedule an appt

## 2020-05-12 ENCOUNTER — VIRTUAL VISIT (OUTPATIENT)
Dept: FAMILY MEDICINE | Facility: CLINIC | Age: 30
End: 2020-05-12
Payer: COMMERCIAL

## 2020-05-12 VITALS — HEIGHT: 66 IN | BODY MASS INDEX: 28.93 KG/M2 | WEIGHT: 180 LBS

## 2020-05-12 DIAGNOSIS — F90.0 ATTENTION DEFICIT HYPERACTIVITY DISORDER (ADHD), PREDOMINANTLY INATTENTIVE TYPE: ICD-10-CM

## 2020-05-12 PROCEDURE — 99214 OFFICE O/P EST MOD 30 MIN: CPT | Mod: GT | Performed by: FAMILY MEDICINE

## 2020-05-12 RX ORDER — DEXTROAMPHETAMINE SACCHARATE, AMPHETAMINE ASPARTATE, DEXTROAMPHETAMINE SULFATE AND AMPHETAMINE SULFATE 2.5; 2.5; 2.5; 2.5 MG/1; MG/1; MG/1; MG/1
10 TABLET ORAL DAILY
Qty: 30 TABLET | Refills: 0 | Status: SHIPPED | OUTPATIENT
Start: 2020-05-12 | End: 2020-06-16

## 2020-05-12 RX ORDER — DEXTROAMPHETAMINE SACCHARATE, AMPHETAMINE ASPARTATE MONOHYDRATE, DEXTROAMPHETAMINE SULFATE AND AMPHETAMINE SULFATE 7.5; 7.5; 7.5; 7.5 MG/1; MG/1; MG/1; MG/1
30 CAPSULE, EXTENDED RELEASE ORAL DAILY
Qty: 30 CAPSULE | Refills: 0 | Status: SHIPPED | OUTPATIENT
Start: 2020-05-12 | End: 2020-06-16

## 2020-05-12 ASSESSMENT — MIFFLIN-ST. JEOR: SCORE: 1729.22

## 2020-05-12 NOTE — NURSING NOTE
Mailed copy of non-opiod controlled substance contract to review/sign/return     Marlee Montemayor CMA on 5/12/2020 at 9:55 AM

## 2020-05-12 NOTE — LETTER
[facility]  05/12/20    Patient: Cinda Peterson  YOB: 1990  Medical Record Number: [id number]  [facility]: [id number]                                                                              Non-opioid Controlled Substance Agreement    I understand that my care provider has prescribed a controlled substance to help manage my condition(s). I am taking this medicine to help me function or work. I know this is strong medicine, and that it can cause serious side effects. Controlled substances can be sedating, addicting and may cause a dependency on the drug. They can affect my ability to drive or think, and cause depression. They need to be taken exactly as prescribed. Combining controlled substances with certain medicines or chemicals (such as cocaine, sedatives and tranquilizers, sleeping pills, meth) can be dangerous or even fatal. Also, if I stop controlled substances suddenly, I may have severe withdrawal symptoms.  If not helpful, I may be asked to stop them.    The risks, benefits, and side effects of these medicine(s) were explained to me. I agree that:    1. I will take part in other treatments as advised by my care team. This may be psychiatry or counseling, physical therapy, behavioral therapy, group treatment or a referral to a pain clinic. I will reduce or stop my medicine when my care team tells me to do so.  2. I will take my medicines as prescribed. I will not change the dose or schedule unless my care team tells me to. There will be no refills if I  run out early.   I may be contactedwithout warning and asked to complete a urine drug test or pill count at any time.   3. I will keep all my appointments, and understand this is part of the monitoring of controlled substances. My care team may require an office visit for EVERY controlled substance refill. If I miss appointments or don t follow instructions, my care team may stop my medicine.  4. I will not ask other  providers to prescribe controlled substances, and I will not accept controlled substances from other people. If I need another prescribed controlled substance for a new reason, I will tell my care team within 1 business day.  5. I will use one pharmacy to fill all of my controlled substance prescriptions, and it is up to me to make sure that I do not run out of my medicines on weekends or holidays. If my care team is willing to refill my controlled substance prescription without a visit, I must request refills only during office hours, refills may take up to 3 days to process, and it may take up to 5 to 7 days for my medicine to be mailed and ready at my pharmacy. Prescriptions will not be mailed anywhere except my pharmacy.    6. I am responsible for my prescriptions. If the medicine/prescription is lost or stolen, it will not be replaced. I also agree not to share controlled substance medicines with anyone.              [facility]  05/12/20  Patient:  Cinda Peterson  YOB: 1990  Medical Record Number: [id number]  [facility]: [id number]    7. I agree to not use ANY illegal or recreational drugs. This includes marijuana, cocaine, bath salts or other drugs. I agree not to use alcohol unless my care team says I may. I agree to give urine samples whenever asked. If I don t give a urine sample, the care team may stop my medicine.    8. If I enroll in the [place] Medical Marijuana program, I will tell my care team. I will also sign an agreement to share my medical records with my care team.    9. I will bring in my list of medicines (or my medicine bottles) each time I come to the clinic.   10. I will tell my care team right away if I become pregnant or have a new medical problem treated outside of my regular clinic.  11. I understand that this medicine can affect my thinking and judgment. It may be unsafe for me to drive, use machinery and do dangerous tasks. I will not do any of these  things until I know how the medicine affects me. If my dose changes, I will wait to see how it affects me. I will contact my care team if I have concerns about medicine side effects.    I understand that if I do not follow any of the conditions above, my prescriptions or treatment may be stopped.      I agree that my provider, clinic care team, and pharmacy may work with any city, state or federal law enforcement agency that investigates the misuse, sale, or other diversion of my controlled medicine. I will allow my provider to discuss my care with or share a copy of this agreement with any other treating provider, pharmacy or emergency room where I receive care. I agree to give up (waive) any right of privacy or confidentiality with respect to these consents.   I have read this agreement and have asked questions about anything I did not understand.    ____________________________________________________    ____________  ________  Patient signature                                                         Date      Time    ____________________________________________________     ____________  ________  Witness                                                          Date      Time    ____________________________________________________  Provider signature

## 2020-05-12 NOTE — PROGRESS NOTES
"Cinda Peterson is a 29 year old male who is being evaluated via a billable video visit.      The patient has been notified of following:     \"This video visit will be conducted via a call between you and your physician/provider. We have found that certain health care needs can be provided without the need for an in-person physical exam.  This service lets us provide the care you need with a video conversation.  If a prescription is necessary we can send it directly to your pharmacy.  If lab work is needed we can place an order for that and you can then stop by our lab to have the test done at a later time.    Video visits are billed at different rates depending on your insurance coverage.  Please reach out to your insurance provider with any questions.    If during the course of the call the physician/provider feels a video visit is not appropriate, you will not be charged for this service.\"    Patient has given verbal consent for Video visit? Yes    How would you like to obtain your AVS? Jerry    Patient would like the video invitation sent by: Text to cell phone: [phone]    Will anyone else be joining your video visit? No        Subjective     Cinda Peterson is a 29 year old male who presents today via video visit for the following health issues:    HPI  Medication Followup of Adderall, Adderall XR    Taking Medication as prescribed: yes    Side Effects:  None    Medication Helping Symptoms:  yes           Video Start Time: 8:31      Attention deficit hyperactivity disorder (ADHD), predominantly inattentive type :overall doing well.  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      Longstanding adhd diagnosis.  Used to be on 20mg  Twice daily short acting.  Changed to extended release 20mg daily by dr. Michael. Does not feel working as well.  Not as effective and seems to wear off by end of the day.  No change in appetite, insomnia.  Feeling well emotionally.     No substance abuse currently " or in past including any marijuana use.         Problem list, Medication list, Allergies, and Medical/Social/Surgical histories reviewed in EPIC and updated as appropriate.Reviewed and updated as needed this visit by Provider  Labs reviewed in EPIC    Patient Active Problem List   Diagnosis     H/O vitamin D deficiency     Attention deficit hyperactivity disorder (ADHD), predominantly inattentive type     Mild intermittent asthma without complication     TB lung, latent     Medial epicondylitis of elbow, left     History reviewed. No pertinent surgical history.    Social History     Tobacco Use     Smoking status: Never Smoker     Smokeless tobacco: Never Used   Substance Use Topics     Alcohol use: No     Alcohol/week: 0.0 standard drinks     Comment: quit drinking     Family History   Problem Relation Age of Onset     No Known Problems Mother      No Known Problems Father      No Known Problems Maternal Grandmother            Current Outpatient Medications   Medication Sig Dispense Refill     albuterol (VENTOLIN HFA) 108 (90 Base) MCG/ACT inhaler Inhale 2 puffs into the lungs 4 times daily 18 g 3     amphetamine-dextroamphetamine (ADDERALL XR) 20 MG 24 hr capsule Take 1 capsule (20 mg) by mouth every morning 30 capsule 0     amphetamine-dextroamphetamine (ADDERALL XR) 30 MG 24 hr capsule Take 1 capsule (30 mg) by mouth daily 30 capsule 0     amphetamine-dextroamphetamine (ADDERALL) 10 MG tablet Take 1 tablet (10 mg) by mouth daily 30 tablet 0     cholecalciferol (VITAMIN D3) 1000 UNIT tablet Take 1,000 Units by mouth daily       fluticasone (FLOVENT HFA) 44 MCG/ACT inhaler Inhale 1 puff into the lungs 2 times daily 1 Inhaler 3     Omega-3 Fatty Acids (OMEGA-3 PLUS) 1000 MG CAPS Take 1,000 mg by mouth daily       No Known Allergies  Recent Labs   Lab Test 10/29/19  1000 08/17/12  0917   LDL 89 101*   HDL 43 45   TRIG 53 68   CR  --  0.81   POTASSIUM  --  4.0        BP Readings from Last 3 Encounters:   03/10/20  "111/71   02/10/20 114/68   02/03/20 110/66    Wt Readings from Last 3 Encounters:   05/12/20 81.6 kg (180 lb)   03/10/20 83.9 kg (185 lb)   02/10/20 83.9 kg (185 lb)                      ROS:  Constitutional, HEENT, cardiovascular, pulmonary, GI, , musculoskeletal, neuro, skin, endocrine and psych systems are negative, except as otherwise noted.      Objective    Ht 1.676 m (5' 6\")   Wt 81.6 kg (180 lb)   BMI 29.05 kg/m      Physical Exam     GENERAL: healthy, alert and no distress  EYES: Eyes grossly normal to inspection, conjunctivae and sclerae normal  RESP: no audible wheeze, cough, or visible cyanosis.  No visible retractions or increased work of breathing.  Able to speak fully in complete sentences.  NEURO: Cranial nerves grossly intact, mentation intact and speech normal  PSYCH: mentation appears normal, affect normal/bright, judgement and insight intact, normal speech and appearance well-groomed  Clear speech.     Diagnostic Test Results:  Labs reviewed in Epic      ASSESSMENT AND PLAN  1. Attention deficit hyperactivity disorder (ADHD), predominantly inattentive type  Discussed rational for not using high doses of short acting including increased risk of abuse/addiction and generally wears off quickly .     I proposed a mixed strategy of using extended release in am and then a lower dose of short acting in the afternoon if wearing off.     He agreed with this plan.  Increase xr to 30mg (from 20) to increase effectiveness and add 10mg short acting in afternoon.     Due to review/sign controlled substance agreement - will mail to him to review/sign/return.     Due for yearly drug screen - discussed marijuana as well would be a failed screen and he understood (again, does not use).      We can plan to do the drug screen post-covid.     Follow up one month with dr. proctor to review med change/efficacy and if going well can go back to every three month visits.     [place] today.     - amphetamine-dextroamphetamine (ADDERALL XR) 30 MG 24 hr capsule; Take 1 capsule (30 mg) by mouth daily  Dispense: 30 capsule; Refill: 0  - amphetamine-dextroamphetamine (ADDERALL) 10 MG tablet; Take 1 tablet (10 mg) by mouth daily  Dispense: 30 tablet; Refill: 0        Video-Visit Details    Type of service:  Video Visit    Video End Time (time video stopped): 8:43    Originating Location (pt. Location): Home    Distant Location (provider location):  [facility]     Mode of Communication:  Video Conference via [employer]    [facility] in one month.       Joel Wegener, MD

## 2020-06-16 ENCOUNTER — VIRTUAL VISIT (OUTPATIENT)
Dept: FAMILY MEDICINE | Facility: CLINIC | Age: 30
End: 2020-06-16
Payer: COMMERCIAL

## 2020-06-16 VITALS — BODY MASS INDEX: 28.93 KG/M2 | HEIGHT: 66 IN | WEIGHT: 180 LBS

## 2020-06-16 DIAGNOSIS — F43.21 ADJUSTMENT DISORDER WITH DEPRESSED MOOD: Primary | ICD-10-CM

## 2020-06-16 DIAGNOSIS — F90.0 ATTENTION DEFICIT HYPERACTIVITY DISORDER (ADHD), PREDOMINANTLY INATTENTIVE TYPE: ICD-10-CM

## 2020-06-16 PROCEDURE — 99214 OFFICE O/P EST MOD 30 MIN: CPT | Mod: GT | Performed by: FAMILY MEDICINE

## 2020-06-16 PROCEDURE — 96127 BRIEF EMOTIONAL/BEHAV ASSMT: CPT | Performed by: FAMILY MEDICINE

## 2020-06-16 RX ORDER — DEXTROAMPHETAMINE SACCHARATE, AMPHETAMINE ASPARTATE, DEXTROAMPHETAMINE SULFATE AND AMPHETAMINE SULFATE 2.5; 2.5; 2.5; 2.5 MG/1; MG/1; MG/1; MG/1
10 TABLET ORAL DAILY
Qty: 30 TABLET | Refills: 0 | Status: SHIPPED | OUTPATIENT
Start: 2020-08-15 | End: 2020-10-02

## 2020-06-16 RX ORDER — DEXTROAMPHETAMINE SACCHARATE, AMPHETAMINE ASPARTATE MONOHYDRATE, DEXTROAMPHETAMINE SULFATE AND AMPHETAMINE SULFATE 7.5; 7.5; 7.5; 7.5 MG/1; MG/1; MG/1; MG/1
30 CAPSULE, EXTENDED RELEASE ORAL DAILY
Qty: 30 CAPSULE | Refills: 0 | Status: SHIPPED | OUTPATIENT
Start: 2020-07-16 | End: 2020-06-16

## 2020-06-16 RX ORDER — DEXTROAMPHETAMINE SACCHARATE, AMPHETAMINE ASPARTATE, DEXTROAMPHETAMINE SULFATE AND AMPHETAMINE SULFATE 2.5; 2.5; 2.5; 2.5 MG/1; MG/1; MG/1; MG/1
10 TABLET ORAL DAILY
Qty: 30 TABLET | Refills: 0 | Status: SHIPPED | OUTPATIENT
Start: 2020-06-16 | End: 2020-06-16

## 2020-06-16 RX ORDER — DEXTROAMPHETAMINE SACCHARATE, AMPHETAMINE ASPARTATE, DEXTROAMPHETAMINE SULFATE AND AMPHETAMINE SULFATE 2.5; 2.5; 2.5; 2.5 MG/1; MG/1; MG/1; MG/1
10 TABLET ORAL DAILY
Qty: 30 TABLET | Refills: 0 | Status: SHIPPED | OUTPATIENT
Start: 2020-07-16 | End: 2020-06-16

## 2020-06-16 RX ORDER — DEXTROAMPHETAMINE SACCHARATE, AMPHETAMINE ASPARTATE MONOHYDRATE, DEXTROAMPHETAMINE SULFATE AND AMPHETAMINE SULFATE 7.5; 7.5; 7.5; 7.5 MG/1; MG/1; MG/1; MG/1
30 CAPSULE, EXTENDED RELEASE ORAL DAILY
Qty: 30 CAPSULE | Refills: 0 | Status: SHIPPED | OUTPATIENT
Start: 2020-06-16 | End: 2020-06-16

## 2020-06-16 RX ORDER — DEXTROAMPHETAMINE SACCHARATE, AMPHETAMINE ASPARTATE MONOHYDRATE, DEXTROAMPHETAMINE SULFATE AND AMPHETAMINE SULFATE 7.5; 7.5; 7.5; 7.5 MG/1; MG/1; MG/1; MG/1
30 CAPSULE, EXTENDED RELEASE ORAL DAILY
Qty: 30 CAPSULE | Refills: 0 | Status: SHIPPED | OUTPATIENT
Start: 2020-08-15 | End: 2020-10-02

## 2020-06-16 ASSESSMENT — ANXIETY QUESTIONNAIRES
2. NOT BEING ABLE TO STOP OR CONTROL WORRYING: NOT AT ALL
IF YOU CHECKED OFF ANY PROBLEMS ON THIS QUESTIONNAIRE, HOW DIFFICULT HAVE THESE PROBLEMS MADE IT FOR YOU TO DO YOUR WORK, TAKE CARE OF THINGS AT HOME, OR GET ALONG WITH OTHER PEOPLE: NOT DIFFICULT AT ALL
7. FEELING AFRAID AS IF SOMETHING AWFUL MIGHT HAPPEN: NOT AT ALL
6. BECOMING EASILY ANNOYED OR IRRITABLE: NOT AT ALL
5. BEING SO RESTLESS THAT IT IS HARD TO SIT STILL: NOT AT ALL
1. FEELING NERVOUS, ANXIOUS, OR ON EDGE: NOT AT ALL
3. WORRYING TOO MUCH ABOUT DIFFERENT THINGS: NOT AT ALL
GAD7 TOTAL SCORE: 0

## 2020-06-16 ASSESSMENT — MIFFLIN-ST. JEOR: SCORE: 1724.22

## 2020-06-16 ASSESSMENT — PATIENT HEALTH QUESTIONNAIRE - PHQ9
5. POOR APPETITE OR OVEREATING: NOT AT ALL
SUM OF ALL RESPONSES TO PHQ QUESTIONS 1-9: 6

## 2020-06-16 NOTE — PROGRESS NOTES
"Cinda Peterson is a 30 year old male who is being evaluated via a billable video visit.      The patient has been notified of following:     \"This video visit will be conducted via a call between you and your physician/provider. We have found that certain health care needs can be provided without the need for an in-person physical exam.  This service lets us provide the care you need with a video conversation.  If a prescription is necessary we can send it directly to your pharmacy.  If lab work is needed we can place an order for that and you can then stop by our lab to have the test done at a later time.    Video visits are billed at different rates depending on your insurance coverage.  Please reach out to your insurance provider with any questions.    If during the course of the call the physician/provider feels a video visit is not appropriate, you will not be charged for this service.\"    Patient has given verbal consent for Video visit? Yes    Will anyone else be joining your video visit? No     Subjective     Cinda Peterson is a 30 year old male who presents today via video visit for the following health issues:    HPI  Medication Followup of Adderall XR 20 and 30mg    Taking Medication as prescribed: yes    Side Effects:  None    Medication Helping Symptoms:  yes     Video Start Time: 8:31 AM    Working from home.     No major changes in life.    Last time Dr Wegener increased dose of adderall xr to 30mg from 20mg and added 10mg adderall every afternoon. Both are new rx.   He feels it is working well without any major side effects and he would like try that. Afternoon dose - using only when working.   Does OK with it.     Saw psychologist for a year or so ago. Does not feel happy all the time.  Wondering about seeing a psychiatrist.    Reviewed and updated as needed this visit by Provider         Review of Systems   Constitutional, HEENT, cardiovascular, pulmonary, gi and gu systems " "are negative, except as otherwise noted.      Objective    Ht 1.676 m (5' 6\")   Wt 81.6 kg (180 lb)   BMI 29.05 kg/m    Estimated body mass index is 29.05 kg/m  as calculated from the following:    Height as of this encounter: 1.676 m (5' 6\").    Weight as of this encounter: 81.6 kg (180 lb).  Physical Exam     GENERAL: Healthy, alert and no distress  EYES: Eyes grossly normal to inspection.  No discharge or erythema, or obvious scleral/conjunctival abnormalities.  RESP: No audible wheeze, cough, or visible cyanosis.  No visible retractions or increased work of breathing.    SKIN: Visible skin clear. No significant rash, abnormal pigmentation or lesions.  NEURO: Cranial nerves grossly intact.  Mentation and speech appropriate for age.  PSYCH: Mentation appears normal, affect normal/bright, judgement and insight intact, normal speech and appearance well-groomed.           Assessment & Plan   (F43.21) Adjustment disorder with depressed mood  (primary encounter diagnosis)  Comment: With concern of depression.  Wishes to see psychiatrist.  Many patients with ADHD has underlying comorbidities including anxiety and depression it would be appropriate to see psychiatrist for further evaluation.  Plan: MENTAL HEALTH REFERRAL  - Adult; Psychiatry;         Psychiatry; Other: [facility]         [phone]; We will contact you to schedule        the appointment or please call with any         questions             (F90.0) Attention deficit hyperactivity disorder (ADHD), predominantly inattentive type  Comment: Last time Adderall xr dose was increased to 30 mg and immediate release Adderall was added for the afternoon.  He would like to continue for next few months.  3 months of Rx sent.  Plan: MENTAL HEALTH REFERRAL  - Adult; Psychiatry;         Psychiatry; Other: [facility]         [phone]; We will contact you to schedule        the appointment or please call with any         questions, " amphetamine-dextroamphetamine         (ADDERALL XR) 30 MG 24 hr capsule,         amphetamine-dextroamphetamine (ADDERALL) 10 MG         tablet, DISCONTINUED:         amphetamine-dextroamphetamine (ADDERALL) 10 MG         tablet, DISCONTINUED:         amphetamine-dextroamphetamine (ADDERALL XR) 30         MG 24 hr capsule, DISCONTINUED:         amphetamine-dextroamphetamine (ADDERALL XR) 30         MG 24 hr capsule, DISCONTINUED:         amphetamine-dextroamphetamine (ADDERALL) 10 MG         tablet             Vish Michael MD, MD  [facility]          Video-Visit Details    Type of service:  Video Visit    Video End Time:8:48 AM    Originating Location (pt. Location): Home    Distant Location (provider location):  home    Platform used for Video Visit: [employer]    No follow-ups on file.       Vish Michael MD, MD

## 2020-06-17 ASSESSMENT — ANXIETY QUESTIONNAIRES: GAD7 TOTAL SCORE: 0

## 2020-09-10 ENCOUNTER — THERAPY VISIT (OUTPATIENT)
Dept: PHYSICAL THERAPY | Facility: CLINIC | Age: 30
End: 2020-09-10
Payer: COMMERCIAL

## 2020-09-10 DIAGNOSIS — M25.512 SHOULDER PAIN, LEFT: ICD-10-CM

## 2020-09-10 DIAGNOSIS — M54.2 NECK PAIN: ICD-10-CM

## 2020-09-10 DIAGNOSIS — M25.511 SHOULDER PAIN, RIGHT: Primary | ICD-10-CM

## 2020-09-10 PROCEDURE — 97110 THERAPEUTIC EXERCISES: CPT | Mod: GP | Performed by: PHYSICAL THERAPIST

## 2020-09-10 PROCEDURE — 97161 PT EVAL LOW COMPLEX 20 MIN: CPT | Mod: GP | Performed by: PHYSICAL THERAPIST

## 2020-09-10 NOTE — PROGRESS NOTES
[facility] Initial Evaluation  Subjective:  Pt reports history of about 1 year ,September 2019, history of shoulder pain.  He notes catching and clicking with overhead press motion.  He has right greater than left cervical area pain with endrange rotation and with overhead reaching and lifting.  Patient works at sitting computer job.                          Objective:  Standing Alignment:    Cervical/Thoracic:  Flat thoracic upper spine                    Flexibility/Screens:     Upper Extremity:    Decreased left upper extremity flexibility at:  Pectoralis Major; Pectoralis Minor and Latissimus    Decreased right upper extremity flexibility present at:  Pectoralis Minor and Latissimus    Spine:  Decreased left spine flexibility:  Upper Trap and Levator    Decreased right spine flexibility:  Upper Trap and Levator                  Cervical/Thoracic Evaluation    AROM:  AROM Cervical:    Flexion:          Minimal loss  Extension:       Moderate loss  Rotation:         Left: Minimal loss tightness right cervical endrange     Right: Moderate loss with right mid to lower cervical pain mild  Side Bend:      Left:     Right:                 Cervical Palpation:    Tenderness present at Left:    Suboccipitals  Tenderness present at Right:    Erector Spinae and Suboccipitals               Shoulder Evaluation:  ROM:  AROM:    Flexion:  Left:  155    Right:  165        Internal Rotation:  Left:  Firm and feel 75    Right:  Firm and feel 70  External Rotation:  Left:  70    Right:  75                      Strength:    Flexion: Left:/5 strong/pain free   Pain:    Right: /5 strong/pain free    Pain:   Extension:  Left:/5 Strong/pain free    Pain:    Right: /5  Strong/pain free  Pain:  Abduction:  Left:/5 Strong/pain free  Pain:    Right:/5   Strong/painful    Pain:-/+    Internal Rotation:  Left:/5  Strong/pain free    Pain:    Right:/5   Strong/painful    Pain:-/+  External Rotation:   Left:4-/5      Pain:-    Right:4/5      Pain:-/+                Palpation:    Left shoulder tenderness present at:  Sternoclavicular; Supraspinatus; Subscapularis; Levator; Rhomboids and Upper Trap  Left shoulder tenderness not present at: Acrimioclavicular or Bicipital Groove  Right shoulder tenderness present at: Sternoclavicular; Supraspinatus; Infraspinatus; Teres Minor; Subscapularis; Levator; Rhomboids and Upper Trap  Right shoulder tenderness not present at:Acrimioclavicular or Bicipital Groove  Mobility Tests:      Glenohumeral posterior left:  Hypomobile  Glenohumeral posterior right:  Hypomobile  Glenohumeral inferior right:  Hypomobile                                             General     ROS    Assessment/Plan:    Patient is a 30 year old male with cervical and bilateral shoulder complaints.    Patient has the following significant findings with corresponding treatment plan.                Diagnosis 1:  Neck and back pain  Pain -  hot/cold therapy, manual therapy and self management  Decreased ROM/flexibility - manual therapy and therapeutic exercise  Decreased joint mobility - manual therapy and therapeutic exercise  Impaired muscle performance - neuro re-education  Decreased function - therapeutic activities  Impaired posture - neuro re-education  Diagnosis 2: Shoulder pain   Pain -  hot/cold therapy, manual therapy and self management  Decreased ROM/flexibility - manual therapy and therapeutic exercise  Decreased joint mobility - manual therapy and therapeutic exercise  Decreased strength - therapeutic exercise and therapeutic activities  Impaired muscle performance - neuro re-education  Decreased function - therapeutic activities  Impaired posture - neuro re-education    Therapy Evaluation Codes:   1) History comprised of:   Personal factors that impact the plan of care:      None.    Comorbidity factors that impact the plan of care are:      None.     Medications impacting care: None.  2) Examination of Body Systems  comprised of:   Body structures and functions that impact the plan of care:      Cervical spine, Shoulder and Thoracic Spine.   Activity limitations that impact the plan of care are:      Lifting, Sports and Working.  3) Clinical presentation characteristics are:   Stable/Uncomplicated.  4) Decision-Making    Low complexity using standardized patient assessment instrument and/or measureable assessment of functional outcome.  Cumulative Therapy Evaluation is: Low complexity.    Previous and current functional limitations:  (See Goal Flow Sheet for this information)    Short term and Long term goals: (See Goal Flow Sheet for this information)     Communication ability:  Patient appears to be able to clearly communicate and understand verbal and written communication and follow directions correctly.  Treatment Explanation - The following has been discussed with the patient:   RX ordered/plan of care  Anticipated outcomes  Possible risks and side effects  This patient would benefit from PT intervention to resume normal activities.   Rehab potential is excellent.    Frequency:  1 X week, once daily  Duration:  for 6 weeks  Discharge Plan:  Achieve all LTG.  Independent in home treatment program.  Reach maximal therapeutic benefit.    Please refer to the daily flowsheet for treatment today, total treatment time and time spent performing 1:1 timed codes.

## 2020-09-15 PROBLEM — M25.511 SHOULDER PAIN, RIGHT: Status: ACTIVE | Noted: 2020-09-15

## 2020-09-15 PROBLEM — M25.512 SHOULDER PAIN, LEFT: Status: ACTIVE | Noted: 2020-09-15

## 2020-09-15 PROBLEM — M54.2 NECK PAIN: Status: ACTIVE | Noted: 2020-09-15

## 2020-09-16 NOTE — PROGRESS NOTES
[facility] Initial Evaluation  Subjective:    Patient Health History             Pertinent medical history includes: asthma.                Current occupation is HR.   Primary job tasks include:  Computer work and prolonged sitting.                                    Objective:  System    Physical Exam    General     ROS    Assessment/Plan:

## 2020-10-01 ENCOUNTER — MYC MEDICAL ADVICE (OUTPATIENT)
Dept: FAMILY MEDICINE | Facility: CLINIC | Age: 30
End: 2020-10-01

## 2020-10-01 ENCOUNTER — THERAPY VISIT (OUTPATIENT)
Dept: PHYSICAL THERAPY | Facility: CLINIC | Age: 30
End: 2020-10-01
Payer: COMMERCIAL

## 2020-10-01 DIAGNOSIS — F90.0 ATTENTION DEFICIT HYPERACTIVITY DISORDER (ADHD), PREDOMINANTLY INATTENTIVE TYPE: ICD-10-CM

## 2020-10-01 DIAGNOSIS — M25.512 SHOULDER PAIN, LEFT: ICD-10-CM

## 2020-10-01 DIAGNOSIS — M25.511 SHOULDER PAIN, RIGHT: ICD-10-CM

## 2020-10-01 DIAGNOSIS — M54.2 NECK PAIN: ICD-10-CM

## 2020-10-01 PROCEDURE — 97530 THERAPEUTIC ACTIVITIES: CPT | Mod: GP | Performed by: PHYSICAL THERAPIST

## 2020-10-01 PROCEDURE — 97140 MANUAL THERAPY 1/> REGIONS: CPT | Mod: GP | Performed by: PHYSICAL THERAPIST

## 2020-10-01 NOTE — TELEPHONE ENCOUNTER
Agree, virtual visit vs in person visit for controlled substance refills. I have notified him in the past. Please discuss with him again that via [employer] we can not refill controlled substances.

## 2020-10-01 NOTE — TELEPHONE ENCOUNTER
Dr Michael - KISHORE suspect patient is due for at least a virtual visit as he last had a virtual visit in June 2020 for medication refills.  Please advise.  Suri Olguin RN

## 2020-10-02 NOTE — TELEPHONE ENCOUNTER
Appointment made for virtual and spoke to pt that we need 3-5 days notice on a controlled substance refill request. Gill Reynaga RN

## 2020-10-09 ENCOUNTER — VIRTUAL VISIT (OUTPATIENT)
Dept: FAMILY MEDICINE | Facility: CLINIC | Age: 30
End: 2020-10-09
Payer: COMMERCIAL

## 2020-10-09 DIAGNOSIS — F90.0 ATTENTION DEFICIT HYPERACTIVITY DISORDER (ADHD), PREDOMINANTLY INATTENTIVE TYPE: ICD-10-CM

## 2020-10-09 PROCEDURE — 99213 OFFICE O/P EST LOW 20 MIN: CPT | Mod: 95 | Performed by: FAMILY MEDICINE

## 2020-10-09 RX ORDER — DEXTROAMPHETAMINE SACCHARATE, AMPHETAMINE ASPARTATE, DEXTROAMPHETAMINE SULFATE AND AMPHETAMINE SULFATE 2.5; 2.5; 2.5; 2.5 MG/1; MG/1; MG/1; MG/1
10 TABLET ORAL DAILY
Qty: 30 TABLET | Refills: 0 | Status: SHIPPED | OUTPATIENT
Start: 2020-12-08 | End: 2021-01-20

## 2020-10-09 RX ORDER — DEXTROAMPHETAMINE SACCHARATE, AMPHETAMINE ASPARTATE MONOHYDRATE, DEXTROAMPHETAMINE SULFATE AND AMPHETAMINE SULFATE 7.5; 7.5; 7.5; 7.5 MG/1; MG/1; MG/1; MG/1
30 CAPSULE, EXTENDED RELEASE ORAL DAILY
Qty: 30 CAPSULE | Refills: 0 | Status: SHIPPED | OUTPATIENT
Start: 2020-10-08 | End: 2020-10-09

## 2020-10-09 RX ORDER — DEXTROAMPHETAMINE SACCHARATE, AMPHETAMINE ASPARTATE MONOHYDRATE, DEXTROAMPHETAMINE SULFATE AND AMPHETAMINE SULFATE 7.5; 7.5; 7.5; 7.5 MG/1; MG/1; MG/1; MG/1
30 CAPSULE, EXTENDED RELEASE ORAL DAILY
Qty: 30 CAPSULE | Refills: 0 | Status: SHIPPED | OUTPATIENT
Start: 2020-12-08 | End: 2021-01-20

## 2020-10-09 RX ORDER — DEXTROAMPHETAMINE SACCHARATE, AMPHETAMINE ASPARTATE MONOHYDRATE, DEXTROAMPHETAMINE SULFATE AND AMPHETAMINE SULFATE 7.5; 7.5; 7.5; 7.5 MG/1; MG/1; MG/1; MG/1
30 CAPSULE, EXTENDED RELEASE ORAL DAILY
Qty: 30 CAPSULE | Refills: 0 | Status: SHIPPED | OUTPATIENT
Start: 2020-10-09 | End: 2020-10-09

## 2020-10-09 RX ORDER — DEXTROAMPHETAMINE SACCHARATE, AMPHETAMINE ASPARTATE MONOHYDRATE, DEXTROAMPHETAMINE SULFATE AND AMPHETAMINE SULFATE 7.5; 7.5; 7.5; 7.5 MG/1; MG/1; MG/1; MG/1
30 CAPSULE, EXTENDED RELEASE ORAL DAILY
Qty: 30 CAPSULE | Refills: 0 | Status: SHIPPED | OUTPATIENT
Start: 2020-11-08 | End: 2020-10-09

## 2020-10-09 RX ORDER — DEXTROAMPHETAMINE SACCHARATE, AMPHETAMINE ASPARTATE, DEXTROAMPHETAMINE SULFATE AND AMPHETAMINE SULFATE 2.5; 2.5; 2.5; 2.5 MG/1; MG/1; MG/1; MG/1
10 TABLET ORAL DAILY
Qty: 30 TABLET | Refills: 0 | Status: SHIPPED | OUTPATIENT
Start: 2020-10-08 | End: 2020-10-09

## 2020-10-09 RX ORDER — DEXTROAMPHETAMINE SACCHARATE, AMPHETAMINE ASPARTATE, DEXTROAMPHETAMINE SULFATE AND AMPHETAMINE SULFATE 2.5; 2.5; 2.5; 2.5 MG/1; MG/1; MG/1; MG/1
10 TABLET ORAL DAILY
Qty: 30 TABLET | Refills: 0 | Status: SHIPPED | OUTPATIENT
Start: 2020-11-08 | End: 2020-10-09

## 2020-10-09 RX ORDER — DEXTROAMPHETAMINE SACCHARATE, AMPHETAMINE ASPARTATE, DEXTROAMPHETAMINE SULFATE AND AMPHETAMINE SULFATE 2.5; 2.5; 2.5; 2.5 MG/1; MG/1; MG/1; MG/1
10 TABLET ORAL DAILY
Qty: 30 TABLET | Refills: 0 | Status: SHIPPED | OUTPATIENT
Start: 2020-10-09 | End: 2020-10-09

## 2020-10-09 ASSESSMENT — ANXIETY QUESTIONNAIRES
2. NOT BEING ABLE TO STOP OR CONTROL WORRYING: NOT AT ALL
5. BEING SO RESTLESS THAT IT IS HARD TO SIT STILL: NOT AT ALL
IF YOU CHECKED OFF ANY PROBLEMS ON THIS QUESTIONNAIRE, HOW DIFFICULT HAVE THESE PROBLEMS MADE IT FOR YOU TO DO YOUR WORK, TAKE CARE OF THINGS AT HOME, OR GET ALONG WITH OTHER PEOPLE: NOT DIFFICULT AT ALL
1. FEELING NERVOUS, ANXIOUS, OR ON EDGE: NOT AT ALL
GAD7 TOTAL SCORE: 0
7. FEELING AFRAID AS IF SOMETHING AWFUL MIGHT HAPPEN: NOT AT ALL
6. BECOMING EASILY ANNOYED OR IRRITABLE: NOT AT ALL
3. WORRYING TOO MUCH ABOUT DIFFERENT THINGS: NOT AT ALL

## 2020-10-09 ASSESSMENT — PATIENT HEALTH QUESTIONNAIRE - PHQ9
5. POOR APPETITE OR OVEREATING: NOT AT ALL
SUM OF ALL RESPONSES TO PHQ QUESTIONS 1-9: 0

## 2020-10-09 NOTE — PROGRESS NOTES
"Cinda Peterson is a 30 year old male who is being evaluated via a billable video visit.      The patient has been notified of following:     \"This video visit will be conducted via a call between you and your physician/provider. We have found that certain health care needs can be provided without the need for an in-person physical exam.  This service lets us provide the care you need with a video conversation.  If a prescription is necessary we can send it directly to your pharmacy.  If lab work is needed we can place an order for that and you can then stop by our lab to have the test done at a later time.    Video visits are billed at different rates depending on your insurance coverage.  Please reach out to your insurance provider with any questions.    If during the course of the call the physician/provider feels a video visit is not appropriate, you will not be charged for this service.\"    Patient has given verbal consent for Video visit? Yes  How would you like to obtain your AVS? MyChart  If you are dropped from the video visit, the video invite should be resent to: Text to cell phone: [phone]  Will anyone else be joining your video visit? No     Subjective     Cinda Peterson is a 30 year old male who presents today via video visit for the following health issues:    HPI     Medication Followup of Adderall XR 30mg and Adderall 10mg    Taking Medication as prescribed: yes    Side Effects:  None    Medication Helping Symptoms:  yes       Video Start Time: 8:51 AM      Social History     Tobacco Use     Smoking status: Never Smoker     Smokeless tobacco: Never Used   Substance Use Topics     Alcohol use: No     Alcohol/week: 0.0 standard drinks     Comment: quit drinking     Drug use: No     PHQ 6/16/2020 10/9/2020   PHQ-9 Total Score 6 0   Q9: Thoughts of better off dead/self-harm past 2 weeks Not at all Not at all     HARI-7 SCORE 6/16/2020 10/9/2020   Total Score 0 0     Last PHQ-9 " "10/9/2020   1.  Little interest or pleasure in doing things 0   2.  Feeling down, depressed, or hopeless 0   3.  Trouble falling or staying asleep, or sleeping too much 0   4.  Feeling tired or having little energy 0   5.  Poor appetite or overeating 0   6.  Feeling bad about yourself 0   7.  Trouble concentrating 0   8.  Moving slowly or restless 0   Q9: Thoughts of better off dead/self-harm past 2 weeks 0   PHQ-9 Total Score 0   Difficulty at work, home, or with people Not difficult at all     HARI-7  10/9/2020   1. Feeling nervous, anxious, or on edge 0   2. Not being able to stop or control worrying 0   3. Worrying too much about different things 0   4. Trouble relaxing 0   5. Being so restless that it is hard to sit still 0   6. Becoming easily annoyed or irritable 0   7. Feeling afraid, as if something awful might happen 0   HARI-7 Total Score 0   If you checked any problems, how difficult have they made it for you to do your work, take care of things at home, or get along with other people? Not difficult at all     No sick contact.   Work is stable.   Did not  6 pills. Was not aware of it.       Suicide Assessment Five-step Evaluation and Treatment (SAFE-T)    Review of Systems   Constitutional, HEENT, cardiovascular, pulmonary, gi and gu systems are negative, except as otherwise noted.      Objective    Vitals - Patient Reported  Weight (Patient Reported): 81.6 kg (180 lb)  Height (Patient Reported): 167.6 cm (5' 6\")  BMI (Based on Pt Reported Ht/Wt): 29.05        Physical Exam     GENERAL: Healthy, alert and no distress  EYES: Eyes grossly normal to inspection.  No discharge or erythema, or obvious scleral/conjunctival abnormalities.  RESP: No audible wheeze, cough, or visible cyanosis.  No visible retractions or increased work of breathing.    SKIN: Visible skin clear. No significant rash, abnormal pigmentation or lesions.  NEURO: Cranial nerves grossly intact.  Mentation and speech appropriate for " age.  PSYCH: Mentation appears normal, affect normal/bright, judgement and insight intact, normal speech and appearance well-groomed.        Assessment & Plan     Attention deficit hyperactivity disorder (ADHD), predominantly inattentive type  OK to fill 3 months rx. Next time in person visit for bp check. He agreed.   - amphetamine-dextroamphetamine (ADDERALL XR) 30 MG 24 hr capsule; Take 1 capsule (30 mg) by mouth daily  - amphetamine-dextroamphetamine (ADDERALL) 10 MG tablet; Take 1 tablet (10 mg) by mouth daily    Vish Michael MD, MD  [facility]      Video-Visit Details    Type of service:  Video Visit    Video End Time:9:00 AM    Originating Location (pt. Location): Home    Distant Location (provider location):  [facility]     Platform used for Video Visit: [employer]

## 2020-10-10 ASSESSMENT — ANXIETY QUESTIONNAIRES: GAD7 TOTAL SCORE: 0

## 2020-10-10 ASSESSMENT — ASTHMA QUESTIONNAIRES: ACT_TOTALSCORE: 21

## 2020-10-27 ENCOUNTER — MYC MEDICAL ADVICE (OUTPATIENT)
Dept: FAMILY MEDICINE | Facility: CLINIC | Age: 30
End: 2020-10-27

## 2020-10-27 DIAGNOSIS — M79.672 PAIN IN BOTH FEET: Primary | ICD-10-CM

## 2020-10-27 DIAGNOSIS — M79.671 PAIN IN BOTH FEET: Primary | ICD-10-CM

## 2020-11-03 ENCOUNTER — OFFICE VISIT (OUTPATIENT)
Dept: PODIATRY | Facility: CLINIC | Age: 30
End: 2020-11-03
Payer: COMMERCIAL

## 2020-11-03 VITALS
BODY MASS INDEX: 29.25 KG/M2 | DIASTOLIC BLOOD PRESSURE: 68 MMHG | SYSTOLIC BLOOD PRESSURE: 94 MMHG | WEIGHT: 182 LBS | HEIGHT: 66 IN

## 2020-11-03 DIAGNOSIS — M21.41 BILATERAL PES PLANUS: Primary | ICD-10-CM

## 2020-11-03 DIAGNOSIS — M79.671 ARCH PAIN, RIGHT: ICD-10-CM

## 2020-11-03 DIAGNOSIS — M79.671 PAIN IN BOTH FEET: ICD-10-CM

## 2020-11-03 DIAGNOSIS — M76.821 POSTERIOR TIBIAL TENDON DYSFUNCTION (PTTD) OF BOTH LOWER EXTREMITIES: ICD-10-CM

## 2020-11-03 DIAGNOSIS — M79.672 PAIN IN BOTH FEET: ICD-10-CM

## 2020-11-03 DIAGNOSIS — M21.42 BILATERAL PES PLANUS: Primary | ICD-10-CM

## 2020-11-03 DIAGNOSIS — M76.822 POSTERIOR TIBIAL TENDON DYSFUNCTION (PTTD) OF BOTH LOWER EXTREMITIES: ICD-10-CM

## 2020-11-03 PROCEDURE — 99203 OFFICE O/P NEW LOW 30 MIN: CPT | Performed by: PODIATRIST

## 2020-11-03 ASSESSMENT — MIFFLIN-ST. JEOR: SCORE: 1733.3
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
## 2020-11-03 NOTE — PATIENT INSTRUCTIONS
Thank you for choosing [facility] Podiatry / Foot & Ankle Surgery!    DR. MADERA'S CLINIC LOCATIONS:   [address] [facility] [address]   [place] [address]                        [place] [address]   [phone]      SET UP SURGERY: [phone]   APPOINTMENTS: [phone]   BILLING QUESTIONS: [phone]   FAX NUMBER: [phone]       Follow up: 4 weeks    Please read through the following handouts and if you have any questions, please feel free to call us or send a [employer] message!      FLAT FEET   Flatfoot is often a complex disorder, with diverse symptoms and varying degrees of deformity and disability. There are several types of flatfoot, all of which have one characteristic in common: partial or total collapse (loss) of the arch.  Other characteristics shared by most types of flatfoot include:   Toe drift,  in which the toes and front part of the foot point outward   The heel tilts toward the outside and the ankle appears to turn in   A tight Achilles tendon, which causes the heel to lift off the ground earlier when walking and may make the problem worse   Bunions and hammertoes may develop as a result of a flatfoot.   Flexible Flatfoot  Flexible flatfoot is one of the most common types of flatfoot. It typically begins in childhood or adolescence and continues into adulthood. It usually occurs in both feet and progresses in severity throughout the adult years. As the deformity worsens, the soft tissues (tendons and ligaments) of the arch may stretch or tear and can become inflamed.  The term  flexible  means that while the foot is flat when standing (weight-bearing), the arch returns when not standing.  SYMPTOMS  Pain in the heel, arch, ankle, or along the outside of the foot    Rolled-in  ankle (over-pronation)   Pain along the shin bone (shin splint)    General aching or fatigue in the foot or leg   Low back, hip or knee pain.   DIAGNOSIS  In diagnosing flatfoot, the foot and ankle surgeon examines the foot and observes how it looks when you stand and sit. X-rays are usually taken to determine the severity of the disorder. If you are diagnosed with flexible flatfoot but you don t have any symptoms, your surgeon will explain what you might expect in the future.  NON-SURGICAL TREATMENT  If you experience symptoms with flexible flatfoot, the surgeon may recommend non-surgical treatment options, including:  Activity modifications. Cut down on activities that bring you pain and avoid prolonged walking and standing to give your arches a rest.   Weight loss. If you are overweight, try to lose weight. Putting too much weight on your arches may aggravate your symptoms.   Orthotic devices. Your foot and ankle surgeon can provide you with custom orthotic devices for your shoes to give more support to the arches.   Immobilization. In some cases, it may be necessary to use a walking cast or to completely avoid weight-bearing.   Medications. Nonsteroidal anti-inflammatory drugs (NSAIDs), such as ibuprofen, help reduce pain and inflammation.   Physical therapy. Ultrasound therapy or other physical therapy modalities may be used to provide temporary relief.   Shoe modifications. Wearing shoes that support the arches is important for anyone who has flatfoot.   SURGICAL TREATMENT  In some patients whose pain is not adequately relieved by other treatments, surgery may be considered. A variety of surgical techniques is available to correct flexible flatfoot, and one or a combination of procedures may be required to relieve the symptoms and improve foot function.  In selecting the procedure or combination of procedures for your particular case, the foot and ankle surgeon will take into consideration the extent of your deformity based on the x-ray findings, your age, your activity  level, and other factors. The length of the recovery period will vary, depending on the procedure or procedures performed.    PRICE THERAPY  Many aches and pains throughout the foot and ankle can be helped with many simple treatments. This is usually described as PRICE Therapy.      P - Protection - often times, inflammation/pain in the lower extremity is not able to improve simply because the areas involved are never allowed to rest. Every step we take can bother the problematic area. Protecting those areas is an important step in the healing process. This may involve a walking cast boot, a special insert/orthotic device, an ankle brace, or simply avoiding barefoot walking.    R - Rest - in addition to protecting the foot/ankle, resting is an important, but often times difficult, treatment option. Getting off your feet when they bother you, and specifically avoiding activities that cause pain/discomfort, are very beneficial to prevent, and treat, foot/ankle pain.      I - Ice - icing regularly can help to decrease inflammation and swelling in the foot, thus decreasing pain. Using an ice pack or a bag of frozen veggies works very well. Ice for 20 minutes multiple times per day as needed.  Do not place the ice directly on the skin as this can cause tissue damage.    C - Compression - using a compression wrap or an ACE wrap can help to decrease swelling, which can help to decrease pain. Wearing the wraps is generally not needed at night, but they should be worn on a regular basis when you are going to be on your feet for prolonged periods as gravity tends to pull fluids down to your feet/ankles.    E - Elevation - elevating your lower extremities multiple times daily for 15-20 minutes can help to decrease swelling, which works well in decreasing pain levels.    NSAID/Tylenol - Anti-inflammatories like Aleve or ibuprofen, and/or a pain medication, such as Tylenol, can help to improve pain levels and get the issue  resolved sooner rather than later. Anyone with liver issues should be careful with Tylenol, and anyone with high blood pressure or heart, stomach or kidney issues should be careful with anti-inflammatories. Please ask if you have questions about these medications, including dosage.    OVER THE COUNTER INSERTS    Most of these can be found at your local [employer], [employer], or online:    Clipboard   Sofsole Fit Spe[employer]   Power Step   Walk-Fit ([employer])  *For heel pain* Arch Cradles  *For heel pain*       ** A good high quality over the counter insert should cost around $40-$50    ** Capsulitis/Metarasalgia - try adding a metatarsal pad on your inserts or find an insert with one built in    [employer] [address]  [phone]   [address]  [phone] [place]  [address] [place] [address]  [phone]   [place]  [address]  [phone]   [place]  [address]  [phone] [place] [address]  [phone]   [place]  [address]  [phone]   [place]  [address]  [phone] [place]  [address]  [phone]

## 2020-12-14 ENCOUNTER — HEALTH MAINTENANCE LETTER (OUTPATIENT)
Age: 30
End: 2020-12-14

## 2021-01-13 ENCOUNTER — MYC MEDICAL ADVICE (OUTPATIENT)
Dept: FAMILY MEDICINE | Facility: CLINIC | Age: 31
End: 2021-01-13

## 2021-01-20 ENCOUNTER — OFFICE VISIT (OUTPATIENT)
Dept: FAMILY MEDICINE | Facility: CLINIC | Age: 31
End: 2021-01-20
Payer: COMMERCIAL

## 2021-01-20 VITALS
DIASTOLIC BLOOD PRESSURE: 60 MMHG | HEART RATE: 80 BPM | RESPIRATION RATE: 16 BRPM | HEIGHT: 68 IN | TEMPERATURE: 97.8 F | WEIGHT: 185 LBS | SYSTOLIC BLOOD PRESSURE: 100 MMHG | BODY MASS INDEX: 28.04 KG/M2 | OXYGEN SATURATION: 100 %

## 2021-01-20 DIAGNOSIS — M54.50 ACUTE BILATERAL LOW BACK PAIN WITHOUT SCIATICA: Primary | ICD-10-CM

## 2021-01-20 DIAGNOSIS — F90.0 ATTENTION DEFICIT HYPERACTIVITY DISORDER (ADHD), PREDOMINANTLY INATTENTIVE TYPE: ICD-10-CM

## 2021-01-20 PROCEDURE — 99000 SPECIMEN HANDLING OFFICE-LAB: CPT | Performed by: FAMILY MEDICINE

## 2021-01-20 PROCEDURE — 99214 OFFICE O/P EST MOD 30 MIN: CPT | Performed by: FAMILY MEDICINE

## 2021-01-20 PROCEDURE — 80307 DRUG TEST PRSMV CHEM ANLYZR: CPT | Mod: 90 | Performed by: FAMILY MEDICINE

## 2021-01-20 RX ORDER — DEXTROAMPHETAMINE SACCHARATE, AMPHETAMINE ASPARTATE MONOHYDRATE, DEXTROAMPHETAMINE SULFATE AND AMPHETAMINE SULFATE 7.5; 7.5; 7.5; 7.5 MG/1; MG/1; MG/1; MG/1
30 CAPSULE, EXTENDED RELEASE ORAL DAILY
Qty: 30 CAPSULE | Refills: 0 | Status: SHIPPED | OUTPATIENT
Start: 2021-02-19 | End: 2021-01-20

## 2021-01-20 RX ORDER — DEXTROAMPHETAMINE SACCHARATE, AMPHETAMINE ASPARTATE, DEXTROAMPHETAMINE SULFATE AND AMPHETAMINE SULFATE 2.5; 2.5; 2.5; 2.5 MG/1; MG/1; MG/1; MG/1
10 TABLET ORAL DAILY
Qty: 30 TABLET | Refills: 0 | Status: SHIPPED | OUTPATIENT
Start: 2021-01-20 | End: 2021-02-26

## 2021-01-20 RX ORDER — DEXTROAMPHETAMINE SACCHARATE, AMPHETAMINE ASPARTATE MONOHYDRATE, DEXTROAMPHETAMINE SULFATE AND AMPHETAMINE SULFATE 7.5; 7.5; 7.5; 7.5 MG/1; MG/1; MG/1; MG/1
30 CAPSULE, EXTENDED RELEASE ORAL DAILY
Qty: 30 CAPSULE | Refills: 0 | Status: SHIPPED | OUTPATIENT
Start: 2021-01-20 | End: 2021-01-20

## 2021-01-20 RX ORDER — DEXTROAMPHETAMINE SACCHARATE, AMPHETAMINE ASPARTATE MONOHYDRATE, DEXTROAMPHETAMINE SULFATE AND AMPHETAMINE SULFATE 7.5; 7.5; 7.5; 7.5 MG/1; MG/1; MG/1; MG/1
30 CAPSULE, EXTENDED RELEASE ORAL DAILY
Qty: 30 CAPSULE | Refills: 0 | Status: SHIPPED | OUTPATIENT
Start: 2021-03-20 | End: 2021-04-09

## 2021-01-20 ASSESSMENT — ANXIETY QUESTIONNAIRES
IF YOU CHECKED OFF ANY PROBLEMS ON THIS QUESTIONNAIRE, HOW DIFFICULT HAVE THESE PROBLEMS MADE IT FOR YOU TO DO YOUR WORK, TAKE CARE OF THINGS AT HOME, OR GET ALONG WITH OTHER PEOPLE: NOT DIFFICULT AT ALL
6. BECOMING EASILY ANNOYED OR IRRITABLE: NOT AT ALL
5. BEING SO RESTLESS THAT IT IS HARD TO SIT STILL: NOT AT ALL
3. WORRYING TOO MUCH ABOUT DIFFERENT THINGS: SEVERAL DAYS
2. NOT BEING ABLE TO STOP OR CONTROL WORRYING: NOT AT ALL
1. FEELING NERVOUS, ANXIOUS, OR ON EDGE: SEVERAL DAYS
7. FEELING AFRAID AS IF SOMETHING AWFUL MIGHT HAPPEN: NOT AT ALL
GAD7 TOTAL SCORE: 2

## 2021-01-20 ASSESSMENT — PATIENT HEALTH QUESTIONNAIRE - PHQ9
5. POOR APPETITE OR OVEREATING: NOT AT ALL
SUM OF ALL RESPONSES TO PHQ QUESTIONS 1-9: 2

## 2021-01-20 ASSESSMENT — MIFFLIN-ST. JEOR: SCORE: 1782.62

## 2021-01-20 NOTE — PROGRESS NOTES
"  Assessment & Plan     Attention deficit hyperactivity disorder (ADHD), predominantly inattentive type  We discussed random urine tox. Patient mentioned he had edible marijuana. I discussed this time it is OK if it shows up on urine tox but in future if it persist we can not continue controlled substance as per our clinic policy. I provided him a copy of his controlled substance agreement.   He is understandably nervous about result showing up on his records and employement. Discussed it is his private records and his employer can not request urine drug screen without his permission. He understood.   - stick to same rx for now and immediate release 10mg - he is using infrequently and if 30 pills are not lasting 90 days, OK to request refill.   - Drug  Screen Comprehensive, Urine w/o Reported Meds (Pain Care Package)  - amphetamine-dextroamphetamine (ADDERALL) 10 MG tablet; Take 1 tablet (10 mg) by mouth daily  - amphetamine-dextroamphetamine (ADDERALL XR) 30 MG 24 hr capsule; Take 1 capsule (30 mg) by mouth daily    Acute bilateral low back pain without sciatica  No red flag on exam. Start with PT.   - [facility] REFERRAL; Future                       BMI:   Estimated body mass index is 27.92 kg/m  as calculated from the following:    Height as of this encounter: 1.734 m (5' 8.25\").    Weight as of this encounter: 83.9 kg (185 lb).             No follow-ups on file.    Vish Michael MD, MD  [facility]    Subjective     TK is a 30 year old who presents to clinic today for the following health issues     HPI       Hypertension Follow-up      Do you check your blood pressure regularly outside of the clinic? No     Are you following a low salt diet? No    Are your blood pressures ever more than 140 on the top number (systolic) OR more   than 90 on the bottom number (diastolic), for example 140/90? No      How many servings of fruits and vegetables do you eat " "daily?  2-3    On average, how many sweetened beverages do you drink each day (Examples: soda, juice, sweet tea, etc.  Do NOT count diet or artificially sweetened beverages)?   0    How many days per week do you exercise enough to make your heart beat faster? 5    How many minutes a day do you exercise enough to make your heart beat faster? 60 or more    How many days per week do you miss taking your medication? 0    Medication Followup of Adderall XR 30mg and Adderall 10mg     Taking Medication as prescribed: yes    Side Effects:  None    Medication Helping Symptoms:  yes   Additional: lower back pain, both sides, has gone away but would like provider to know.     Working from home. Staying safe.     Adderall xr r30mg per day. Skipping it over weekend.   aderall 10mg immediate release not often. 30 pills may last 90 days.     4 pills left.     Back - lower back pain. Hiking around 6 months ago - went up and horrible pain next day. Did not go away and pain continued and tried cream and stuff and it improved. Massage therapist few weeks ago - felt he would benefit from cracking but he could not.   No radiation of pain.   Some symptoms right now. Does not feel it as much anymore.   No history of back injury.     Objective    /60 (BP Location: Left arm, Patient Position: Sitting, Cuff Size: Adult Regular)   Pulse 80   Temp 97.8  F (36.6  C) (Oral)   Resp 16   Ht 1.734 m (5' 8.25\")   Wt 83.9 kg (185 lb)   SpO2 100%   BMI 27.92 kg/m    Body mass index is 27.92 kg/m .  Physical Exam   Back rom not restricted. Both straight leg tests negative.                   "

## 2021-01-20 NOTE — PATIENT INSTRUCTIONS
COVID vaccine FAQ:  https://bit.ly/90f6jb3      Did you know?      You can schedule a video visit for follow-up appointments as well as future appointments for certain conditions.  Please see the below link.     [email or web address]    If you have not already done so,  I encourage you to sign up for [employer] ([email or web address]).  This will allow you to review your results, securely communicate with a provider, and schedule virtual visits as well.

## 2021-01-21 ASSESSMENT — ANXIETY QUESTIONNAIRES: GAD7 TOTAL SCORE: 2

## 2021-01-26 LAB — COMPREHEN DRUG ANALYSIS UR: NORMAL

## 2021-02-18 ENCOUNTER — OFFICE VISIT (OUTPATIENT)
Dept: PODIATRY | Facility: CLINIC | Age: 31
End: 2021-02-18
Payer: COMMERCIAL

## 2021-02-18 VITALS
BODY MASS INDEX: 27.28 KG/M2 | DIASTOLIC BLOOD PRESSURE: 68 MMHG | SYSTOLIC BLOOD PRESSURE: 110 MMHG | WEIGHT: 180 LBS | HEIGHT: 68 IN

## 2021-02-18 DIAGNOSIS — M21.41 BILATERAL PES PLANUS: ICD-10-CM

## 2021-02-18 DIAGNOSIS — M25.561 ACUTE PAIN OF RIGHT KNEE: Primary | ICD-10-CM

## 2021-02-18 DIAGNOSIS — M21.42 BILATERAL PES PLANUS: ICD-10-CM

## 2021-02-18 PROCEDURE — 99207 PR NO CHARGE LOS: CPT | Performed by: PODIATRIST

## 2021-02-18 ASSESSMENT — MIFFLIN-ST. JEOR: SCORE: 1759.94

## 2021-02-18 NOTE — PROGRESS NOTES
Foot & Ankle Surgery  February 18, 2021    Via [employer], TK mentioned a crunching sound.  I assumed this was in his foot, and advised a clinic follow up.  He clarified in clinic today that the crunching is in his right knee.      Discussed options for knee:  1.  Monitoring, home therapies, 2.  PT, 3.  Sports Med.      Advised Sports Med.  Patient agrees.  Referral placed, no-charge for visit.    Discussed inserts vs orthotics.  Has Super[employer] inserts.  Call/[facility] for Orthotic Lab referral prn.    All questions answered, patient happy with plan.    Quinn Alexis, ARMANI FACFAS FACFAOM  Podiatric Foot & Ankle Surgeon  [facility]  [phone]

## 2021-02-26 ENCOUNTER — MYC MEDICAL ADVICE (OUTPATIENT)
Dept: FAMILY MEDICINE | Facility: CLINIC | Age: 31
End: 2021-02-26

## 2021-02-26 DIAGNOSIS — F90.0 ATTENTION DEFICIT HYPERACTIVITY DISORDER (ADHD), PREDOMINANTLY INATTENTIVE TYPE: ICD-10-CM

## 2021-02-26 RX ORDER — DEXTROAMPHETAMINE SACCHARATE, AMPHETAMINE ASPARTATE, DEXTROAMPHETAMINE SULFATE AND AMPHETAMINE SULFATE 2.5; 2.5; 2.5; 2.5 MG/1; MG/1; MG/1; MG/1
10 TABLET ORAL DAILY
Qty: 30 TABLET | Refills: 0 | Status: SHIPPED | OUTPATIENT
Start: 2021-02-26 | End: 2021-04-09

## 2021-03-01 ENCOUNTER — MYC MEDICAL ADVICE (OUTPATIENT)
Dept: PODIATRY | Facility: CLINIC | Age: 31
End: 2021-03-01

## 2021-03-01 NOTE — TELEPHONE ENCOUNTER
Received voicemail from patient stating he saw Dr. Alexis on 2/18/21 and he placed a referral for Ortho. He states they are not able to find it.   Please call: [phone].     Patient also sent a [employer] message.     Phone call to patient and informed writer can see the referral, so unsure of why scheduling wasn't able to see it.  Appointment scheduled with Dr. Alvarez per patient request in [place] on 3/2/21 at 8:00. Recommended he check with insurance to make sure Dr. Alvarez is in network. He verbalized understanding and was appreciative of return call.     CJ Hemphill, RN

## 2021-03-02 ENCOUNTER — OFFICE VISIT (OUTPATIENT)
Dept: ORTHOPEDICS | Facility: CLINIC | Age: 31
End: 2021-03-02
Payer: COMMERCIAL

## 2021-03-02 ENCOUNTER — ANCILLARY PROCEDURE (OUTPATIENT)
Dept: GENERAL RADIOLOGY | Facility: CLINIC | Age: 31
End: 2021-03-02
Attending: FAMILY MEDICINE
Payer: COMMERCIAL

## 2021-03-02 VITALS
SYSTOLIC BLOOD PRESSURE: 106 MMHG | WEIGHT: 180 LBS | HEART RATE: 81 BPM | HEIGHT: 68 IN | DIASTOLIC BLOOD PRESSURE: 66 MMHG | BODY MASS INDEX: 27.28 KG/M2

## 2021-03-02 DIAGNOSIS — M25.561 ACUTE PAIN OF RIGHT KNEE: ICD-10-CM

## 2021-03-02 DIAGNOSIS — M22.2X1 PATELLOFEMORAL PAIN SYNDROME OF RIGHT KNEE: Primary | ICD-10-CM

## 2021-03-02 DIAGNOSIS — M25.561 RIGHT KNEE PAIN: ICD-10-CM

## 2021-03-02 PROCEDURE — 99214 OFFICE O/P EST MOD 30 MIN: CPT | Performed by: FAMILY MEDICINE

## 2021-03-02 PROCEDURE — 73562 X-RAY EXAM OF KNEE 3: CPT | Performed by: RADIOLOGY

## 2021-03-02 ASSESSMENT — MIFFLIN-ST. JEOR: SCORE: 1759.94

## 2021-03-02 NOTE — PROGRESS NOTES
ASSESSMENT & PLAN  Cinda was seen today for pain.    Diagnoses and all orders for this visit:    Patellofemoral pain syndrome of right knee    Acute pain of right knee  -     XR Knee Standing AP Bilat [place] Bilat Lat Right; Future      Patient is a 30 year old male presenting for evaluation of   Chief Complaint   Patient presents with     Right Knee - Pain     # Acute Right Knee Pain: Notable over the past 2-3 weeks in the setting of walking and doing deep squatting.  No pain on exam today.  X-rays negative for arthritis, shallow trochlear groove.  There is laxity on patella testing.  Etiology likely patellofemoral pain syndrome.  Given this plan to treat as below  Image Findings: Shallow trochlear groove no arthritis   Treatment: home exercises, patella knee brace  Job: no restrictions   Medications/Injections: Limited tylenol/ibuprofen for pain for 1-2 weeks  Follow-up: In one month if symptoms do not improve, sooner if worsening    Concerning signs/sx that would warrant urgent evaluation were discussed.  All questions were answered, patient understands and agrees with plan.      Return in about 1 month (around 4/2/2021), or if symptoms worsen or fail to improve.    -----    SUBJECTIVE  Cinad Peterson is a/an 30 year old male who is seen in consultation at the request of Quinn Alexis DPM for evaluation of right knee pain. The patient is seen by themselves.    Onset: 2-3 week(s) ago. Patient describes injury as squatting.  Medial knee pain with deep squats.  Location of Pain: right lateral knee   Rating of Pain at worst: 10/10  Rating of Pain Currently: 0/10  Worsened by: bending   Better with: nothing   Treatments tried: ice  Associated symptoms: crunching   Orthopedic history: NO  Relevant surgical history: NO  Past Medical History:   Diagnosis Date     Adult ADHD      H/O vitamin D deficiency      Social History     Socioeconomic History     Marital status: Single     Spouse name: None      "Number of children: None     Years of education: None     Highest education level: None   Occupational History     None   Social Needs     Financial resource strain: None     Food insecurity     Worry: None     Inability: None     Transportation needs     Medical: None     Non-medical: None   Tobacco Use     Smoking status: Never Smoker     Smokeless tobacco: Never Used   Substance and Sexual Activity     Alcohol use: No     Alcohol/week: 0.0 standard drinks     Comment: quit drinking     Drug use: No     Sexual activity: Yes     Partners: Female     Birth control/protection: Condom   Lifestyle     Physical activity     Days per week: None     Minutes per session: None     Stress: None   Relationships     Social connections     Talks on phone: None     Gets together: None     Attends Church service: None     Active member of club or organization: None     Attends meetings of clubs or organizations: None     Relationship status: None     Intimate partner violence     Fear of current or ex partner: None     Emotionally abused: None     Physically abused: None     Forced sexual activity: None   Other Topics Concern     Parent/sibling w/ CABG, MI or angioplasty before 65F 55M? Not Asked   Social History Narrative     None         Patient's past medical, surgical, social, and family histories were reviewed today and no changes are noted.    REVIEW OF SYSTEMS:  10 point ROS is negative other than symptoms noted above in HPI, Past Medical History or as stated below  Constitutional: NEGATIVE for fever, chills, change in weight  Skin: NEGATIVE for worrisome rashes, moles or lesions  GI/: NEGATIVE for bowel or bladder changes  Neuro: NEGATIVE for weakness, dizziness or paresthesias    OBJECTIVE:  /66   Pulse 81   Ht 1.734 m (5' 8.25\")   Wt 81.6 kg (180 lb)   BMI 27.17 kg/m     General: healthy, alert and in no distress  HEENT: no scleral icterus or conjunctival erythema  Skin: no suspicious lesions or rash. No " jaundice.  CV: no pedal edema  Resp: normal respiratory effort without conversational dyspnea   Psych: normal mood and affect  Gait: normal steady gait with appropriate coordination and balance  Neuro: Normal light sensory exam of lower extremity  MSK:  RIGHT KNEE  Inspection:    normal alignment, painless plica  Palpation:  Nontender.    No effusion is present    Patellofemoral crepitus is Absent  Range of Motion:     00 extension to 1350 flexion  Strength:    Quadriceps 5/5, hamstrings 5/5, gastrocsoleus 5/5 and tibialis anterior 5/5    Extensor mechanism intact  Special Tests:    Positive: None    Negative: Patellar grind, MCL/valgus stress (0 & 30 deg), LCL/varus stress (0 & 30 deg), Lachman's, anterior drawer, posterior drawer, Luis F's    Independent visualization of the below image:  Recent Results (from the past 24 hour(s))   XR Knee Standing AP Bilat [place] Bilat Lat Right    Narrative    KNEE STANDING AP BILATERAL [place] BILATERAL LATERAL RIGHT  3/2/2021  8:12 AM     HISTORY: Right knee pain.    COMPARISON: None.      Impression    IMPRESSION: Normal joint spacing. No fracture or effusion.  Intercondylar notch appears somewhat widened which may be upper  normal. It can also be seen in hemophiliac arthropathy or juvenile  rheumatoid arthritis. This is likely just upper normal if this patient  has no history of the above.        I  ordered, visualized and reviewed these images with the patient  No fracture, no arthritis    Patient's conditions were thoroughly discussed during today's visit with greater than 50% of the visit spent counseling the patient with total time spent face-to-face with the patient being 30 minutes.    Barry Alvarez MD, [facility]

## 2021-03-02 NOTE — LETTER
3/2/2021         RE: Cinda Petersno  [address]  [place] [address]        Dear Colleague,    Thank you for referring your patient, Cinda Peterson, to the [facility]. Please see a copy of my visit note below.    ASSESSMENT & PLAN  Cinda was seen today for pain.    Diagnoses and all orders for this visit:    Patellofemoral pain syndrome of right knee    Acute pain of right knee  -     XR Knee Standing AP Bilat [place] Bilat Lat Right; Future      Patient is a 30 year old male presenting for evaluation of   Chief Complaint   Patient presents with     Right Knee - Pain     # Acute Right Knee Pain: Notable over the past 2-3 weeks in the setting of walking and doing deep squatting.  No pain on exam today.  X-rays negative for arthritis, shallow trochlear groove.  There is laxity on patella testing.  Etiology likely patellofemoral pain syndrome.  Given this plan to treat as below  Image Findings: Shallow trochlear groove no arthritis   Treatment: home exercises, patella knee brace  Job: no restrictions   Medications/Injections: Limited tylenol/ibuprofen for pain for 1-2 weeks  Follow-up: In one month if symptoms do not improve, sooner if worsening    Concerning signs/sx that would warrant urgent evaluation were discussed.  All questions were answered, patient understands and agrees with plan.      Return in about 1 month (around 4/2/2021), or if symptoms worsen or fail to improve.    -----    SUBJECTIVE  Cinda Peterson is a/an 30 year old male who is seen in consultation at the request of Quinn Alexis DPM for evaluation of right knee pain. The patient is seen by themselves.    Onset: 2-3 week(s) ago. Patient describes injury as squatting.  Medial knee pain with deep squats.  Location of Pain: right lateral knee   Rating of Pain at worst: 10/10  Rating of Pain Currently: 0/10  Worsened by: bending   Better with: nothing    Treatments tried: ice  Associated symptoms: crunching   Orthopedic history: NO  Relevant surgical history: NO  Past Medical History:   Diagnosis Date     Adult ADHD      H/O vitamin D deficiency      Social History     Socioeconomic History     Marital status: Single     Spouse name: None     Number of children: None     Years of education: None     Highest education level: None   Occupational History     None   Social Needs     Financial resource strain: None     Food insecurity     Worry: None     Inability: None     Transportation needs     Medical: None     Non-medical: None   Tobacco Use     Smoking status: Never Smoker     Smokeless tobacco: Never Used   Substance and Sexual Activity     Alcohol use: No     Alcohol/week: 0.0 standard drinks     Comment: quit drinking     Drug use: No     Sexual activity: Yes     Partners: Female     Birth control/protection: Condom   Lifestyle     Physical activity     Days per week: None     Minutes per session: None     Stress: None   Relationships     Social connections     Talks on phone: None     Gets together: None     Attends Muslim service: None     Active member of club or organization: None     Attends meetings of clubs or organizations: None     Relationship status: None     Intimate partner violence     Fear of current or ex partner: None     Emotionally abused: None     Physically abused: None     Forced sexual activity: None   Other Topics Concern     Parent/sibling w/ CABG, MI or angioplasty before 65F 55M? Not Asked   Social History Narrative     None         Patient's past medical, surgical, social, and family histories were reviewed today and no changes are noted.    REVIEW OF SYSTEMS:  10 point ROS is negative other than symptoms noted above in HPI, Past Medical History or as stated below  Constitutional: NEGATIVE for fever, chills, change in weight  Skin: NEGATIVE for worrisome rashes, moles or lesions  GI/: NEGATIVE for bowel or bladder  "changes  Neuro: NEGATIVE for weakness, dizziness or paresthesias    OBJECTIVE:  /66   Pulse 81   Ht 1.734 m (5' 8.25\")   Wt 81.6 kg (180 lb)   BMI 27.17 kg/m     General: healthy, alert and in no distress  HEENT: no scleral icterus or conjunctival erythema  Skin: no suspicious lesions or rash. No jaundice.  CV: no pedal edema  Resp: normal respiratory effort without conversational dyspnea   Psych: normal mood and affect  Gait: normal steady gait with appropriate coordination and balance  Neuro: Normal light sensory exam of lower extremity  MSK:  RIGHT KNEE  Inspection:    normal alignment, painless plica  Palpation:  Nontender.    No effusion is present    Patellofemoral crepitus is Absent  Range of Motion:     00 extension to 1350 flexion  Strength:    Quadriceps 5/5, hamstrings 5/5, gastrocsoleus 5/5 and tibialis anterior 5/5    Extensor mechanism intact  Special Tests:    Positive: None    Negative: Patellar grind, MCL/valgus stress (0 & 30 deg), LCL/varus stress (0 & 30 deg), Lachman's, anterior drawer, posterior drawer, Luis F's    Independent visualization of the below image:  Recent Results (from the past 24 hour(s))   XR Knee Standing AP Bilat [place] Bilat Lat Right    Narrative    KNEE STANDING AP BILATERAL [place] BILATERAL LATERAL RIGHT  3/2/2021  8:12 AM     HISTORY: Right knee pain.    COMPARISON: None.      Impression    IMPRESSION: Normal joint spacing. No fracture or effusion.  Intercondylar notch appears somewhat widened which may be upper  normal. It can also be seen in hemophiliac arthropathy or juvenile  rheumatoid arthritis. This is likely just upper normal if this patient  has no history of the above.        I  ordered, visualized and reviewed these images with the patient  No fracture, no arthritis    Patient's conditions were thoroughly discussed during today's visit with greater than 50% of the visit spent counseling the patient with total time spent face-to-face with the " patient being 30 minutes.    Barry Alvarez MD, [facility]        Again, thank you for allowing me to participate in the care of your patient.        Sincerely,        Barry Alvarez MD

## 2021-03-02 NOTE — PATIENT INSTRUCTIONS
# Acute Right Knee Pain: Notable over the past 2-3 weeks in the setting of walking and doing deep squatting.  No pain on exam today.  X-rays negative for arthritis, shallow trochlear groove.  There is laxity on patella testing.  Etiology likely patellofemoral pain syndrome.  Given this plan to treat as below  Image Findings: Shallow trochlear groove no arthritis   Treatment: home exercises, patella knee brace  Job: no restrictions   Medications/Injections: Limited tylenol/ibuprofen for pain for 1-2 weeks  Follow-up: In one month if symptoms do not improve, sooner if worsening    Please call [phone]   Ask for my team if you have any questions or concerns    It was great seeing you again today!    Barry Alvarez MD, CAQSM    Yoga Strap  3 sets of 30 sec on each side twice daily

## 2021-03-09 ENCOUNTER — THERAPY VISIT (OUTPATIENT)
Dept: PHYSICAL THERAPY | Facility: CLINIC | Age: 31
End: 2021-03-09
Payer: COMMERCIAL

## 2021-03-09 DIAGNOSIS — M54.50 ACUTE BILATERAL LOW BACK PAIN WITHOUT SCIATICA: ICD-10-CM

## 2021-03-09 PROCEDURE — 97535 SELF CARE MNGMENT TRAINING: CPT | Mod: GP | Performed by: PHYSICAL THERAPIST

## 2021-03-09 PROCEDURE — 97161 PT EVAL LOW COMPLEX 20 MIN: CPT | Mod: GP | Performed by: PHYSICAL THERAPIST

## 2021-03-09 PROCEDURE — 97110 THERAPEUTIC EXERCISES: CPT | Mod: GP | Performed by: PHYSICAL THERAPIST

## 2021-03-09 ASSESSMENT — ACTIVITIES OF DAILY LIVING (ADL)
HOW_WOULD_YOU_RATE_THE_OVERALL_FUNCTION_OF_YOUR_KNEE_DURING_YOUR_USUAL_DAILY_ACTIVITIES?: NEARLY NORMAL
WEAKNESS: THE SYMPTOM AFFECTS MY ACTIVITY MODERATELY
RISE FROM A CHAIR: ACTIVITY IS SOMEWHAT DIFFICULT
WALK: ACTIVITY IS MINIMALLY DIFFICULT
SIT WITH YOUR KNEE BENT: ACTIVITY IS SOMEWHAT DIFFICULT
AS_A_RESULT_OF_YOUR_KNEE_INJURY,_HOW_WOULD_YOU_RATE_YOUR_CURRENT_LEVEL_OF_DAILY_ACTIVITY?: ABNORMAL
GO UP STAIRS: ACTIVITY IS NOT DIFFICULT
STIFFNESS: I DO NOT HAVE THE SYMPTOM
STAND: ACTIVITY IS SOMEWHAT DIFFICULT
LIMPING: I DO NOT HAVE THE SYMPTOM
SWELLING: I HAVE THE SYMPTOM BUT IT DOES NOT AFFECT MY ACTIVITY
SQUAT: ACTIVITY IS SOMEWHAT DIFFICULT
HOW_WOULD_YOU_RATE_THE_CURRENT_FUNCTION_OF_YOUR_KNEE_DURING_YOUR_USUAL_DAILY_ACTIVITIES_ON_A_SCALE_FROM_0_TO_100_WITH_100_BEING_YOUR_LEVEL_OF_KNEE_FUNCTION_PRIOR_TO_YOUR_INJURY_AND_0_BEING_THE_INABILITY_TO_PERFORM_ANY_OF_YOUR_USUAL_DAILY_ACTIVITIES?: 70
PAIN: THE SYMPTOM AFFECTS MY ACTIVITY SLIGHTLY
RAW_SCORE: 49
KNEE_ACTIVITY_OF_DAILY_LIVING_SCORE: 70
GO DOWN STAIRS: ACTIVITY IS SOMEWHAT DIFFICULT
KNEE_ACTIVITY_OF_DAILY_LIVING_SUM: 49
KNEEL ON THE FRONT OF YOUR KNEE: ACTIVITY IS SOMEWHAT DIFFICULT
GIVING WAY, BUCKLING OR SHIFTING OF KNEE: THE SYMPTOM AFFECTS MY ACTIVITY SLIGHTLY

## 2021-03-09 NOTE — PROGRESS NOTES
[facility] Initial Evaluation  Subjective:    Patient Health History  Cinda Peterson being seen for Pain.     Problem began: 2/26/2021.   Problem occurred: Working out   Pain is reported as 4/10 on pain scale.  General health as reported by patient is excellent.                     Primary job tasks include:  Computer work.                  Therapist Generated HPI Evaluation  Problem details: Pt presents to PT with c/o R knee pain with onset 1 month ago after deep squatting in the gym.   He also noted this occurred after he starting using orthotics 3 months ago.  Pt also c/o chronic lower back with onset 7-8 months ago after hiking.  He also notes some stiffness in the R neck and upper back.       Pt states he has been working out for 11 years.  His workout program is squat, deadlift, front squat, press, curls, triceps, face pulls.    Pt program is a 2 day split routine for upper and lower body.  He is doing about 4 sets of each exercises at around a 10RM.   Pt reports 7/10 pain in the knees, 1/10 pain in the knee, 4/10 pain the back during his lifts.   The next day after lifting he has 8/10 pain in the knee and  9/10 pain in the R medial ankle.    He reports after taking 1 week off squatting his knee pain was 5/10, 9/10 pain in the R medial ankle, and low back pain was a little less.      Pt reports he has also tried to release his back with lacrosse balls but it made his lifts feel off.      PMH:  Neck/upper back pain, low back pain, B flat feet (orthotics), R>L tibialis posterior pain onset 10 years.         Type of problem:  Left knee.    This is a new condition.                                             Objective:  System         Lumbar/SI Evaluation  ROM:    AROM Lumbar:   Flexion:            100%  Ext:                    100%   Side Bend:        Left:  100%    Right:  75%, +  Rotation:           Left:  100%    Right:  50%, +groin lateral hip pain  Side Glide:        Left:      Right:                         Lumbar Provocation:  Lumbar provocation: Pain with PA glides L3-5.                                          Hip Evaluation  Hip PROM:  Hip PROM:  Left Hip:    Normal (Mild groin discomfort)  Right Hip:  Normal (Mild groin discomfort)                          Hip Strength:      Extension:  Left: 4-/5  Pain:Right: 4-/5    Pain:    Abduction:  Left: 3+/5     Pain:Right: 3+/5    Pain:                           Knee Evaluation:  ROM:  AROM: normal  PROM: normal            Strength:     Extension:  Left: 5-/5   Pain:      Right: 5-/5   Pain:  Flexion:  Left: 5-/5   Pain:      Right: 4+/5   Pain:    Quad Set Left: WNL    Pain:   Quad Set Right: WNL    Pain:  Ligament Testing:  Not Assessed                Special Tests: Not Assessed      Palpation:  Normal      Edema:  Edema of the knee: Mild edema behind posterior R knee, possible bakers cyst.    Mobility Testing:  Mobility testing: R patellar rotation and increased mobility.            Functional Testing:  : Squat with foot ER and knee varus, SLS mild difficulty RLE, SL Squat RLE mild to moderate valgus collaspe.                      General Evaluation:                  Integumentary/Inspection:  Integumentary inspection wnl general: Standing posture - severe STJ collaspe.              Gait:  Gait wnl general: Pt amb with severe STJ prontation without AD.                                         ROS    Assessment/Plan:    Patient is a 30 year old male with right side knee complaints and LBP.    Patient has the following significant findings with corresponding treatment plan.                Diagnosis 1:  R knee pain, PFPS, Low back pain  {:579694}    Therapy Evaluation Codes:   1) History comprised of:   Personal factors that impact the plan of care:      {Personal factors impacting care:603723}.    Comorbidity factors that impact the plan of care are:      {Comorbidities impacting care:856967}.     Medications impacting care: {Medications  "Impacting care:648632}.  2) Examination of Body Systems comprised of:   Body structures and functions that impact the plan of care:      {Body Structures and functions:202860}.   Activity limitations that impact the plan of care are:      {Activity/participation limitations or restrictions:578873}.  3) Clinical presentation characteristics are:   {Clinical presentation characteristics:955083}.  4) Decision-Making    {Decision Making Low/Moderate/High:484411}  Cumulative Therapy Evaluation is: {Low/Moderate/High/Re-evaluation complexity:192258}.    Previous and current functional limitations:  (See Goal Flow Sheet for this information)    Short term and Long term goals: (See Goal Flow Sheet for this information)     Communication ability:  {COMMUNICATION ABILITY:743682::\"Patient appears to be able to clearly communicate and understand verbal and written communication and follow directions correctly.\"}  Treatment Explanation - The following has been discussed with the patient:   {RX EXPLANATION:742064::\"RX ordered/plan of care\",\"Anticipated outcomes\",\"Possible risks and side effects\"}  {RX PLANNED rehab:532920::\"This patient would benefit from PT intervention to resume normal activities.\"}   Rehab potential is {REHAB POTENTIAL/PROGNOSIS:106021}.    Frequency:  {FREQUENCY OF TREATMENT:063410}  Duration:  {DURATION OF TREATMENT:587720}  Discharge Plan:  {Discharge Plan:945389::\"Achieve all LTG.\",\"Independent in home treatment program.\",\"Reach maximal therapeutic benefit.\"}    Please refer to the daily flowsheet for treatment today, total treatment time and time spent performing 1:1 timed codes.       "

## 2021-03-10 NOTE — PROGRESS NOTES
[facility] Initial Evaluation  Subjective:    Patient Health History  Cinda Peterson being seen for Pain.     Problem began: 2/26/2021.   Problem occurred: Working out   Pain is reported as 4/10 on pain scale.  General health as reported by patient is excellent.                     Primary job tasks include:  Computer work.                  Therapist Generated HPI Evaluation  Problem details: Pt presents to PT with c/o R anterior/medial knee pain with onset 1 month ago after deep squatting in the gym.   He also noted he starting using orthotics 3 months ago due to foot pain and flat feet.  Additionally, patient c/o chronic lower back with onset 7-8 months ago after hiking.  He also notes some stiffness in the R neck and upper back.       Pt states he has been working out for 11 years.  His workout program is squat, deadlift, front squat, press, curls, triceps, face pulls.    Pt program is a 2 day split routine for upper and lower body.  He is doing about 4 sets of each exercises at around a 10RM.   Pt reports 7/10 pain in the knees, 1/10 pain in the knee, 4/10 pain the back during his lifts.   The next day after lifting he has 8/10 pain in the knee and  9/10 pain in the R medial ankle.    He reports after taking 1 week off squatting his knee pain was 5/10, 9/10 pain in the R medial ankle, and low back pain was a little less.   However, pain scales do not seem to accurately capture the patients symptoms.    Pt reports he has also tried to release his back with lacrosse balls but it made his lifts feel off.      PMH:  Neck/upper back pain, low back pain, B flat feet (orthotics), R>L tibialis posterior pain onset 10 years.         Type of problem:  Left knee.    This is a new condition.                                             Objective:  System         Lumbar/SI Evaluation  ROM:    AROM Lumbar:   Flexion:            100%  Ext:                    100%   Side Bend:        Left:  100%     Right:  75%, +  Rotation:           Left:  100%    Right:  50%, +groin lateral hip pain  Side Glide:        Left:     Right:                         Lumbar Provocation:  Lumbar provocation: Pain with PA glides L3-5.                                          Hip Evaluation  Hip PROM:  Hip PROM:  Left Hip:    Normal (Mild groin discomfort)  Right Hip:  Normal (Mild groin discomfort)                          Hip Strength:      Extension:  Left: 4-/5  Pain:Right: 4-/5    Pain:    Abduction:  Left: 3+/5     Pain:Right: 3+/5    Pain:                           Knee Evaluation:  ROM:  AROM: normal  PROM: normal            Strength:     Extension:  Left: 5-/5   Pain:      Right: 5-/5   Pain:  Flexion:  Left: 5-/5   Pain:      Right: 4+/5   Pain:    Quad Set Left: WNL    Pain:   Quad Set Right: WNL    Pain:  Ligament Testing:  Not Assessed                Special Tests: Not Assessed      Palpation:  Normal      Edema:  Edema of the knee: Mild edema behind posterior R knee    Mobility Testing:  Mobility testing: R patellar rotation and increased mobility.            Functional Testing:  : Squat with foot ER and knee varus, SLS mild difficulty RLE, SL Squat RLE mild to moderate valgus collaspe.                      General Evaluation:                  Integumentary/Inspection:  Integumentary inspection wnl general: Standing posture - severe STJ collaspe.              Gait:  Gait wnl general: Pt amb with severe STJ prontation without AD.                                         ROS    Assessment/Plan:   Patient has c/o of pain in multiple areas of the body.  Based on examination today, patients symptoms are likely a combination of glute weakness, structural flat feet, and possible overuse in the gym.  Patients symptoms should respond favorably to activity modification and motor control/strengthening exercises.  If patient does not respond to the above treatment focus, more systemic causes of pain may need to be ruled out.      Patient is a 30 year old male with right side knee complaints and LBP.    Patient has the following significant findings with corresponding treatment plan.                Diagnosis 1:  R knee pain, PFPS, Low back pain  Pain -  hot/cold therapy  Decreased strength - therapeutic exercise and therapeutic activities  Impaired balance - neuro re-education and therapeutic activities  Impaired muscle performance - neuro re-education  Decreased function - therapeutic activities  Impaired posture - neuro re-education    Therapy Evaluation Codes:   1) History comprised of:   Personal factors that impact the plan of care:      None.    Comorbidity factors that impact the plan of care are:      None.     Medications impacting care: None.  2) Examination of Body Systems comprised of:   Body structures and functions that impact the plan of care:      Ankle, Knee and Lumbar spine.   Activity limitations that impact the plan of care are:      Lifting, Stairs, Standing and Walking.  3) Clinical presentation characteristics are:   Stable/Uncomplicated.  4) Decision-Making    Low complexity using standardized patient assessment instrument and/or measureable assessment of functional outcome.  Cumulative Therapy Evaluation is: Low complexity.    Previous and current functional limitations:  (See Goal Flow Sheet for this information)    Short term and Long term goals: (See Goal Flow Sheet for this information)     Communication ability:  Patient appears to be able to clearly communicate and understand verbal and written communication and follow directions correctly.  Treatment Explanation - The following has been discussed with the patient:   RX ordered/plan of care  Anticipated outcomes  Possible risks and side effects  This patient would benefit from PT intervention to resume normal activities.   Rehab potential is good.    Frequency:  1 X week, once daily  Duration:  for 4-6 visits  Discharge Plan:  Achieve all LTG.  Independent in  home treatment program.  Return to previous functional level by discharge.  Reach maximal therapeutic benefit.    Please refer to the daily flowsheet for treatment today, total treatment time and time spent performing 1:1 timed codes.       Answers for HPI/ROS submitted by the patient on 3/4/2021   History Reported by Patient  Reason for Visit:: Pain  When problem began:: 2/26/2021  How problem occurred:: Working out  Number scale: 4/10  General health as reported by patient: excellent  What are your primary job tasks: computer work

## 2021-03-16 ENCOUNTER — THERAPY VISIT (OUTPATIENT)
Dept: PHYSICAL THERAPY | Facility: CLINIC | Age: 31
End: 2021-03-16
Payer: COMMERCIAL

## 2021-03-16 DIAGNOSIS — M25.511 SHOULDER PAIN, RIGHT: Primary | ICD-10-CM

## 2021-03-16 DIAGNOSIS — M25.561 RIGHT KNEE PAIN: ICD-10-CM

## 2021-03-16 DIAGNOSIS — M54.50 ACUTE BILATERAL LOW BACK PAIN WITHOUT SCIATICA: ICD-10-CM

## 2021-03-16 PROCEDURE — 97530 THERAPEUTIC ACTIVITIES: CPT | Mod: GP | Performed by: PHYSICAL THERAPIST

## 2021-03-16 PROCEDURE — 97535 SELF CARE MNGMENT TRAINING: CPT | Mod: GP | Performed by: PHYSICAL THERAPIST

## 2021-03-16 PROCEDURE — 97110 THERAPEUTIC EXERCISES: CPT | Mod: GP | Performed by: PHYSICAL THERAPIST

## 2021-04-02 ENCOUNTER — MYC MEDICAL ADVICE (OUTPATIENT)
Dept: FAMILY MEDICINE | Facility: CLINIC | Age: 31
End: 2021-04-02

## 2021-04-05 ENCOUNTER — MYC MEDICAL ADVICE (OUTPATIENT)
Dept: FAMILY MEDICINE | Facility: CLINIC | Age: 31
End: 2021-04-05

## 2021-04-06 ENCOUNTER — THERAPY VISIT (OUTPATIENT)
Dept: PHYSICAL THERAPY | Facility: CLINIC | Age: 31
End: 2021-04-06
Payer: COMMERCIAL

## 2021-04-06 DIAGNOSIS — M25.561 RIGHT KNEE PAIN: ICD-10-CM

## 2021-04-06 DIAGNOSIS — M25.511 SHOULDER PAIN, RIGHT: Primary | ICD-10-CM

## 2021-04-06 PROCEDURE — 97110 THERAPEUTIC EXERCISES: CPT | Mod: GP | Performed by: PHYSICAL THERAPIST

## 2021-04-06 PROCEDURE — 97530 THERAPEUTIC ACTIVITIES: CPT | Mod: GP | Performed by: PHYSICAL THERAPIST

## 2021-04-06 PROCEDURE — 97112 NEUROMUSCULAR REEDUCATION: CPT | Mod: GP | Performed by: PHYSICAL THERAPIST

## 2021-04-06 NOTE — TELEPHONE ENCOUNTER
Virtual video visit this Friday would be appropriate( I have opening) . If he prefers to come in - that is fine too.

## 2021-04-09 ENCOUNTER — VIRTUAL VISIT (OUTPATIENT)
Dept: FAMILY MEDICINE | Facility: CLINIC | Age: 31
End: 2021-04-09
Payer: COMMERCIAL

## 2021-04-09 DIAGNOSIS — J45.20 MILD INTERMITTENT ASTHMA WITHOUT COMPLICATION: ICD-10-CM

## 2021-04-09 DIAGNOSIS — F90.0 ATTENTION DEFICIT HYPERACTIVITY DISORDER (ADHD), PREDOMINANTLY INATTENTIVE TYPE: ICD-10-CM

## 2021-04-09 PROCEDURE — 99214 OFFICE O/P EST MOD 30 MIN: CPT | Mod: 95 | Performed by: FAMILY MEDICINE

## 2021-04-09 RX ORDER — DEXTROAMPHETAMINE SACCHARATE, AMPHETAMINE ASPARTATE, DEXTROAMPHETAMINE SULFATE AND AMPHETAMINE SULFATE 2.5; 2.5; 2.5; 2.5 MG/1; MG/1; MG/1; MG/1
10 TABLET ORAL DAILY
Qty: 30 TABLET | Refills: 0 | Status: SHIPPED | OUTPATIENT
Start: 2021-05-09 | End: 2021-04-09

## 2021-04-09 RX ORDER — DEXTROAMPHETAMINE SACCHARATE, AMPHETAMINE ASPARTATE MONOHYDRATE, DEXTROAMPHETAMINE SULFATE AND AMPHETAMINE SULFATE 7.5; 7.5; 7.5; 7.5 MG/1; MG/1; MG/1; MG/1
30 CAPSULE, EXTENDED RELEASE ORAL DAILY
Qty: 30 CAPSULE | Refills: 0 | Status: SHIPPED | OUTPATIENT
Start: 2021-06-08 | End: 2021-07-28

## 2021-04-09 RX ORDER — DEXTROAMPHETAMINE SACCHARATE, AMPHETAMINE ASPARTATE MONOHYDRATE, DEXTROAMPHETAMINE SULFATE AND AMPHETAMINE SULFATE 7.5; 7.5; 7.5; 7.5 MG/1; MG/1; MG/1; MG/1
30 CAPSULE, EXTENDED RELEASE ORAL DAILY
Qty: 30 CAPSULE | Refills: 0 | Status: SHIPPED | OUTPATIENT
Start: 2021-05-09 | End: 2021-04-09

## 2021-04-09 RX ORDER — DEXTROAMPHETAMINE SACCHARATE, AMPHETAMINE ASPARTATE, DEXTROAMPHETAMINE SULFATE AND AMPHETAMINE SULFATE 2.5; 2.5; 2.5; 2.5 MG/1; MG/1; MG/1; MG/1
10 TABLET ORAL DAILY
Qty: 30 TABLET | Refills: 0 | Status: SHIPPED | OUTPATIENT
Start: 2021-06-08 | End: 2021-04-15

## 2021-04-09 RX ORDER — ALBUTEROL SULFATE 90 UG/1
2 AEROSOL, METERED RESPIRATORY (INHALATION) 4 TIMES DAILY
Qty: 18 G | Refills: 3 | Status: SHIPPED | OUTPATIENT
Start: 2021-04-09 | End: 2022-03-17

## 2021-04-09 RX ORDER — FLUTICASONE PROPIONATE 44 UG/1
1 AEROSOL, METERED RESPIRATORY (INHALATION) 2 TIMES DAILY
Qty: 30 G | Refills: 0 | Status: SHIPPED | OUTPATIENT
Start: 2021-04-09 | End: 2022-03-17

## 2021-04-09 NOTE — PROGRESS NOTES
TK is a 30 year old who is being evaluated via a billable video visit.      How would you like to obtain your AVS? MyChart  If the video visit is dropped, the invitation should be resent by: Text to cell phone: [phone]  Will anyone else be joining your video visit? No      Video Start Time: 10:33 AM    Assessment & Plan     Attention deficit hyperactivity disorder (ADHD), predominantly inattentive type  He was unaware that he already has a prescription of March waiting at the pharmacy.  Checked [place] .  Will do May and June prescription.  He should follow-up early July.  - amphetamine-dextroamphetamine (ADDERALL XR) 30 MG 24 hr capsule; Take 1 capsule (30 mg) by mouth daily  - amphetamine-dextroamphetamine (ADDERALL) 10 MG tablet; Take 1 tablet (10 mg) by mouth daily    Mild intermittent asthma without complication  Resume daily Flovent and albuterol as needed.  - albuterol (VENTOLIN HFA) 108 (90 Base) MCG/ACT inhaler; Inhale 2 puffs into the lungs 4 times daily  - fluticasone (FLOVENT HFA) 44 MCG/ACT inhaler; Inhale 1 puff into the lungs 2 times daily    Follow-up in 3 months    Vish Michael MD, MD  [facility]    Subjective   TK is a 30 year old who presents for the following health issues     HPI     Medication Followup of *Adderall XR 30mg and Adderall 10mg**    Taking Medication as prescribed: yes    Side Effects:  None    Medication Helping Symptoms:  yes     Staying safe.   Working from home.     30mg xr every morning. Taking 10mg mid day daily immediate release.   During weekend not as much on immediate release medication.     [facility] - stopped using it.     Review of Systems         Objective           Vitals:  No vitals were obtained today due to virtual visit.    Physical Exam   GENERAL: Healthy, alert and no distress  EYES: Eyes grossly normal to inspection.  No discharge or erythema, or obvious scleral/conjunctival abnormalities.  RESP: No audible wheeze,  cough, or visible cyanosis.  No visible retractions or increased work of breathing.    SKIN: Visible skin clear. No significant rash, abnormal pigmentation or lesions.  NEURO: Cranial nerves grossly intact.  Mentation and speech appropriate for age.  PSYCH: Mentation appears normal, affect normal/bright, judgement and insight intact, normal speech and appearance well-groomed.                Video-Visit Details    Type of service:  Video Visit    Video End Time:10:43 AM    Originating Location (pt. Location): Home    Distant Location (provider location):  home    Platform used for Video Visit: Derik

## 2021-04-09 NOTE — PATIENT INSTRUCTIONS
We are working hard to begin vaccinating more people against COVID-19.   Check [employer] or go to [employer] website to check your eligibility and to schedule vaccine appointment.   Please call [phone] to schedule your covid vaccine if you are unable to schedule it through [employer].           Did you know?      You can schedule a video visit for follow-up appointments as well as future appointments for certain conditions.  Please see the below link.     [email or web address]    If you have not already done so,  I encourage you to sign up for [email or web address] ([email or web address]).  This will allow you to review your results, securely communicate with a provider, and schedule virtual visits as well.

## 2021-04-15 ENCOUNTER — TELEPHONE (OUTPATIENT)
Dept: FAMILY MEDICINE | Facility: CLINIC | Age: 31
End: 2021-04-15

## 2021-04-15 DIAGNOSIS — F90.0 ATTENTION DEFICIT HYPERACTIVITY DISORDER (ADHD), PREDOMINANTLY INATTENTIVE TYPE: ICD-10-CM

## 2021-04-15 RX ORDER — DEXTROAMPHETAMINE SACCHARATE, AMPHETAMINE ASPARTATE, DEXTROAMPHETAMINE SULFATE AND AMPHETAMINE SULFATE 2.5; 2.5; 2.5; 2.5 MG/1; MG/1; MG/1; MG/1
10 TABLET ORAL DAILY
Qty: 30 TABLET | Refills: 0 | Status: SHIPPED | OUTPATIENT
Start: 2021-04-15 | End: 2021-07-30

## 2021-04-15 RX ORDER — DEXTROAMPHETAMINE SACCHARATE, AMPHETAMINE ASPARTATE, DEXTROAMPHETAMINE SULFATE AND AMPHETAMINE SULFATE 2.5; 2.5; 2.5; 2.5 MG/1; MG/1; MG/1; MG/1
10 TABLET ORAL DAILY
Qty: 30 TABLET | Refills: 0 | Status: SHIPPED | OUTPATIENT
Start: 2021-06-08 | End: 2021-04-15

## 2021-04-15 NOTE — TELEPHONE ENCOUNTER
Dr. Burgos-Could you please re-sign RX.  Start date on Adderall 10 mg tablet ordered today is 6/8/21.  Writer pended order with start date of 4/15/21 with notation to pharmacy to cancel second RX order with start date of 6/8/21.    Thank you!  MARCIO BarnesN, RN  [facility]

## 2021-04-15 NOTE — TELEPHONE ENCOUNTER
Agree.  He just had virtual visit with PCP.   reviewed and he got both XR 30 mg and IR 10 mg filled at end of Feb.  He got XR 30 mg filled 5 days ago but not the IR.  I sent one-month refill of adderall IR 10 mg.    Please let him know.    Nohemi Burgos MD

## 2021-04-15 NOTE — TELEPHONE ENCOUNTER
Pt called in again and said he left My Chart message he only needs the 10 mg of Adderoll prescription and there seems to be some confusion. Can we please fill that one and call him and let him know if its done or if there are any issues.

## 2021-04-20 ENCOUNTER — THERAPY VISIT (OUTPATIENT)
Dept: PHYSICAL THERAPY | Facility: CLINIC | Age: 31
End: 2021-04-20
Payer: COMMERCIAL

## 2021-04-20 DIAGNOSIS — M25.511 SHOULDER PAIN, RIGHT: Primary | ICD-10-CM

## 2021-04-20 PROCEDURE — 97110 THERAPEUTIC EXERCISES: CPT | Mod: GP | Performed by: PHYSICAL THERAPIST

## 2021-04-20 PROCEDURE — 97112 NEUROMUSCULAR REEDUCATION: CPT | Mod: GP | Performed by: PHYSICAL THERAPIST

## 2021-05-19 ENCOUNTER — THERAPY VISIT (OUTPATIENT)
Dept: PHYSICAL THERAPY | Facility: CLINIC | Age: 31
End: 2021-05-19
Payer: COMMERCIAL

## 2021-05-19 DIAGNOSIS — M54.2 NECK PAIN: ICD-10-CM

## 2021-05-19 DIAGNOSIS — M25.561 RIGHT KNEE PAIN: ICD-10-CM

## 2021-05-19 DIAGNOSIS — M25.522 LEFT ELBOW PAIN: ICD-10-CM

## 2021-05-19 DIAGNOSIS — M25.511 SHOULDER PAIN, RIGHT: Primary | ICD-10-CM

## 2021-05-19 PROCEDURE — 97535 SELF CARE MNGMENT TRAINING: CPT | Performed by: PHYSICAL THERAPIST

## 2021-05-19 PROCEDURE — 97110 THERAPEUTIC EXERCISES: CPT | Performed by: PHYSICAL THERAPIST

## 2021-06-02 ENCOUNTER — TELEPHONE (OUTPATIENT)
Dept: FAMILY MEDICINE | Facility: CLINIC | Age: 31
End: 2021-06-02

## 2021-06-08 ENCOUNTER — THERAPY VISIT (OUTPATIENT)
Dept: PHYSICAL THERAPY | Facility: CLINIC | Age: 31
End: 2021-06-08
Payer: COMMERCIAL

## 2021-06-08 DIAGNOSIS — M25.561 RIGHT KNEE PAIN: ICD-10-CM

## 2021-06-08 DIAGNOSIS — M25.511 SHOULDER PAIN, RIGHT: Primary | ICD-10-CM

## 2021-06-08 DIAGNOSIS — M54.50 ACUTE BILATERAL LOW BACK PAIN WITHOUT SCIATICA: ICD-10-CM

## 2021-06-08 PROCEDURE — 97110 THERAPEUTIC EXERCISES: CPT | Mod: GP | Performed by: PHYSICAL THERAPIST

## 2021-06-08 PROCEDURE — 97535 SELF CARE MNGMENT TRAINING: CPT | Mod: GP | Performed by: PHYSICAL THERAPIST

## 2021-07-22 ENCOUNTER — MYC MEDICAL ADVICE (OUTPATIENT)
Dept: FAMILY MEDICINE | Facility: CLINIC | Age: 31
End: 2021-07-22

## 2021-07-22 DIAGNOSIS — F90.0 ATTENTION DEFICIT HYPERACTIVITY DISORDER (ADHD), PREDOMINANTLY INATTENTIVE TYPE: ICD-10-CM

## 2021-07-22 RX ORDER — DEXTROAMPHETAMINE SACCHARATE, AMPHETAMINE ASPARTATE, DEXTROAMPHETAMINE SULFATE AND AMPHETAMINE SULFATE 2.5; 2.5; 2.5; 2.5 MG/1; MG/1; MG/1; MG/1
10 TABLET ORAL DAILY
Qty: 30 TABLET | Refills: 0 | Status: CANCELLED | OUTPATIENT
Start: 2021-07-22

## 2021-07-22 RX ORDER — DEXTROAMPHETAMINE SACCHARATE, AMPHETAMINE ASPARTATE MONOHYDRATE, DEXTROAMPHETAMINE SULFATE AND AMPHETAMINE SULFATE 7.5; 7.5; 7.5; 7.5 MG/1; MG/1; MG/1; MG/1
30 CAPSULE, EXTENDED RELEASE ORAL DAILY
Qty: 30 CAPSULE | Refills: 0 | Status: CANCELLED | OUTPATIENT
Start: 2021-07-22

## 2021-07-22 NOTE — TELEPHONE ENCOUNTER
DR FIELD or DOD  See below  daniel'd up  Last visit 4/21  Due back 3 months- first available 8/11    Thanks!     Gill Reynaga RN

## 2021-07-22 NOTE — TELEPHONE ENCOUNTER
Patient is out of ADDERALL    He took your first available on 8/11    But needs a month refill as soon as possible    Call if questions [phone]    Ok to leave message

## 2021-07-23 NOTE — TELEPHONE ENCOUNTER
Ok to use my same day hold for first available but no refill on controlled substance without visit. He should not wait until he runs out of his rx.

## 2021-07-26 ENCOUNTER — MYC MEDICAL ADVICE (OUTPATIENT)
Dept: FAMILY MEDICINE | Facility: CLINIC | Age: 31
End: 2021-07-26

## 2021-07-26 DIAGNOSIS — F90.0 ATTENTION DEFICIT HYPERACTIVITY DISORDER (ADHD), PREDOMINANTLY INATTENTIVE TYPE: ICD-10-CM

## 2021-07-26 NOTE — TELEPHONE ENCOUNTER
Writer responded via [employer]    MARCIO BarnesN, RN  [facility]

## 2021-07-28 RX ORDER — DEXTROAMPHETAMINE SACCHARATE, AMPHETAMINE ASPARTATE MONOHYDRATE, DEXTROAMPHETAMINE SULFATE AND AMPHETAMINE SULFATE 7.5; 7.5; 7.5; 7.5 MG/1; MG/1; MG/1; MG/1
30 CAPSULE, EXTENDED RELEASE ORAL DAILY
Qty: 30 CAPSULE | Refills: 0 | Status: SHIPPED | OUTPATIENT
Start: 2021-07-28 | End: 2021-08-11

## 2021-07-28 NOTE — TELEPHONE ENCOUNTER
Patient has a virtual appointment with Dr Michael 7/30/21 and an in person 8/11/21.  My Chart response sent  Suri Olguin RN  [facility]

## 2021-07-29 ASSESSMENT — ANXIETY QUESTIONNAIRES
GAD7 TOTAL SCORE: 0
3. WORRYING TOO MUCH ABOUT DIFFERENT THINGS: NOT AT ALL
4. TROUBLE RELAXING: NOT AT ALL
GAD7 TOTAL SCORE: 0
7. FEELING AFRAID AS IF SOMETHING AWFUL MIGHT HAPPEN: NOT AT ALL
2. NOT BEING ABLE TO STOP OR CONTROL WORRYING: NOT AT ALL
GAD7 TOTAL SCORE: 0
7. FEELING AFRAID AS IF SOMETHING AWFUL MIGHT HAPPEN: NOT AT ALL
6. BECOMING EASILY ANNOYED OR IRRITABLE: NOT AT ALL
5. BEING SO RESTLESS THAT IT IS HARD TO SIT STILL: NOT AT ALL
1. FEELING NERVOUS, ANXIOUS, OR ON EDGE: NOT AT ALL
8. IF YOU CHECKED OFF ANY PROBLEMS, HOW DIFFICULT HAVE THESE MADE IT FOR YOU TO DO YOUR WORK, TAKE CARE OF THINGS AT HOME, OR GET ALONG WITH OTHER PEOPLE?: NOT DIFFICULT AT ALL

## 2021-07-29 ASSESSMENT — PATIENT HEALTH QUESTIONNAIRE - PHQ9
10. IF YOU CHECKED OFF ANY PROBLEMS, HOW DIFFICULT HAVE THESE PROBLEMS MADE IT FOR YOU TO DO YOUR WORK, TAKE CARE OF THINGS AT HOME, OR GET ALONG WITH OTHER PEOPLE: NOT DIFFICULT AT ALL
SUM OF ALL RESPONSES TO PHQ QUESTIONS 1-9: 0
SUM OF ALL RESPONSES TO PHQ QUESTIONS 1-9: 0

## 2021-07-30 ENCOUNTER — VIRTUAL VISIT (OUTPATIENT)
Dept: FAMILY MEDICINE | Facility: CLINIC | Age: 31
End: 2021-07-30
Payer: COMMERCIAL

## 2021-07-30 DIAGNOSIS — F90.0 ATTENTION DEFICIT HYPERACTIVITY DISORDER (ADHD), PREDOMINANTLY INATTENTIVE TYPE: ICD-10-CM

## 2021-07-30 PROCEDURE — 96127 BRIEF EMOTIONAL/BEHAV ASSMT: CPT | Performed by: FAMILY MEDICINE

## 2021-07-30 PROCEDURE — 99213 OFFICE O/P EST LOW 20 MIN: CPT | Mod: TEL | Performed by: FAMILY MEDICINE

## 2021-07-30 RX ORDER — DEXTROAMPHETAMINE SACCHARATE, AMPHETAMINE ASPARTATE, DEXTROAMPHETAMINE SULFATE AND AMPHETAMINE SULFATE 2.5; 2.5; 2.5; 2.5 MG/1; MG/1; MG/1; MG/1
10 TABLET ORAL DAILY
Qty: 30 TABLET | Refills: 0 | Status: SHIPPED | OUTPATIENT
Start: 2021-07-30 | End: 2021-08-11

## 2021-07-30 ASSESSMENT — ANXIETY QUESTIONNAIRES: GAD7 TOTAL SCORE: 0

## 2021-07-30 ASSESSMENT — PATIENT HEALTH QUESTIONNAIRE - PHQ9: SUM OF ALL RESPONSES TO PHQ QUESTIONS 1-9: 0

## 2021-07-30 NOTE — PROGRESS NOTES
"TK is a 31 year old who is being evaluated via a billable telephone visit.      What phone number would you like to be contacted at? [phone]  How would you like to obtain your AVS? [employer]    Assessment & Plan     Attention deficit hyperactivity disorder (ADHD), predominantly inattentive type  Got xr 2 days ago by my partner. Will see me next week and I will talk to him again about not requesting refills via [employer] and will help him schedule his appt in advance so that he does not run out of his rx.   - amphetamine-dextroamphetamine (ADDERALL) 10 MG tablet; Take 1 tablet (10 mg) by mouth daily    Right shoulder pain  Discussed I may not be able to do referral outside of system ([place]) but he can check with insurance. He needed a letter for support which I provided but discussed it may not work as an order. If needed, he will contact us and I will involve referral dept.              BMI:   Estimated body mass index is 27.17 kg/m  as calculated from the following:    Height as of 3/2/21: 1.734 m (5' 8.25\").    Weight as of 3/2/21: 81.6 kg (180 lb).           No follow-ups on file.    Vish Michael MD, MD  [facility]    Subjective   TK is a 31 year old who presents for the following health issues     HPI     Medication Followup of adderall 10mg and adderall xr 30mg    Taking Medication as prescribed: NO-ran out of meds last Friday but usually takes it    Side Effects:  None    Medication Helping Symptoms:  yes     Also need a letter stating needing physical therapy. He would be able to get almost free PT through [facility] due to insurance change.     Review of Systems         Objective    Vitals - Patient Reported  Weight (Patient Reported): 79.4 kg (175 lb)  Height (Patient Reported): 167.6 cm (5' 6\")  BMI (Based on Pt Reported Ht/Wt): 28.25        Physical Exam   healthy, alert and no distress  PSYCH: Alert and oriented times 3; coherent speech, normal   rate and " volume, able to articulate logical thoughts, able   to abstract reason, no tangential thoughts, no hallucinations   or delusions  His affect is normal  RESP: No cough, no audible wheezing, able to talk in full sentences  Remainder of exam unable to be completed due to telephone visits                 Phone call duration: 8 minutes        Answers for HPI/ROS submitted by the patient on 7/29/2021  If you checked off any problems, how difficult have these problems made it for you to do your work, take care of things at home, or get along with other people?: Not difficult at all  PHQ9 TOTAL SCORE: 0  HARI 7 TOTAL SCORE: 0  How many servings of fruits and vegetables do you eat daily?: 4 or more  On average, how many sweetened beverages do you drink each day (Examples: soda, juice, sweet tea, etc.  Do NOT count diet or artificially sweetened beverages)?: 0  How many minutes a day do you exercise enough to make your heart beat faster?: 60 or more  How many days a week do you exercise enough to make your heart beat faster?: 5  How many days per week do you miss taking your medication?: 7  What makes it hard for you to take your medication every day?: other

## 2021-07-30 NOTE — LETTER
[address]  [place] [address]  [phone]        July 30, 2021    Regarding:  Cinda Peterson  [address] [place] [address]      To Whom It May Concern;  Above patient is under our professional care and currently seeking physical therapy for his right shoulder. I strongly recommend and suggest he should do physical therapy for his right shoulder pain.  Contact me with questions.           Sincerely,      Vish Michael MD, MD

## 2021-07-31 ASSESSMENT — ASTHMA QUESTIONNAIRES: ACT_TOTALSCORE: 22

## 2021-08-02 ENCOUNTER — MYC MEDICAL ADVICE (OUTPATIENT)
Dept: FAMILY MEDICINE | Facility: CLINIC | Age: 31
End: 2021-08-02

## 2021-08-03 NOTE — TELEPHONE ENCOUNTER
Letter faxed to [facility]: [phone].    Writer responded via [employer].    NATE Barnes, RN  [facility]

## 2021-08-11 ENCOUNTER — OFFICE VISIT (OUTPATIENT)
Dept: FAMILY MEDICINE | Facility: CLINIC | Age: 31
End: 2021-08-11
Payer: COMMERCIAL

## 2021-08-11 VITALS
DIASTOLIC BLOOD PRESSURE: 62 MMHG | HEART RATE: 68 BPM | WEIGHT: 177 LBS | SYSTOLIC BLOOD PRESSURE: 110 MMHG | BODY MASS INDEX: 26.83 KG/M2 | TEMPERATURE: 97.8 F | OXYGEN SATURATION: 100 % | RESPIRATION RATE: 16 BRPM | HEIGHT: 68 IN

## 2021-08-11 DIAGNOSIS — B07.0 PLANTAR WARTS: ICD-10-CM

## 2021-08-11 DIAGNOSIS — F90.0 ATTENTION DEFICIT HYPERACTIVITY DISORDER (ADHD), PREDOMINANTLY INATTENTIVE TYPE: Primary | ICD-10-CM

## 2021-08-11 PROCEDURE — 17110 DESTRUCTION B9 LES UP TO 14: CPT | Performed by: FAMILY MEDICINE

## 2021-08-11 PROCEDURE — 99213 OFFICE O/P EST LOW 20 MIN: CPT | Mod: 25 | Performed by: FAMILY MEDICINE

## 2021-08-11 RX ORDER — DEXTROAMPHETAMINE SACCHARATE, AMPHETAMINE ASPARTATE MONOHYDRATE, DEXTROAMPHETAMINE SULFATE AND AMPHETAMINE SULFATE 7.5; 7.5; 7.5; 7.5 MG/1; MG/1; MG/1; MG/1
30 CAPSULE, EXTENDED RELEASE ORAL DAILY
Qty: 30 CAPSULE | Refills: 0 | Status: SHIPPED | OUTPATIENT
Start: 2021-10-26 | End: 2021-12-03

## 2021-08-11 RX ORDER — DEXTROAMPHETAMINE SACCHARATE, AMPHETAMINE ASPARTATE MONOHYDRATE, DEXTROAMPHETAMINE SULFATE AND AMPHETAMINE SULFATE 7.5; 7.5; 7.5; 7.5 MG/1; MG/1; MG/1; MG/1
30 CAPSULE, EXTENDED RELEASE ORAL DAILY
Qty: 30 CAPSULE | Refills: 0 | Status: SHIPPED | OUTPATIENT
Start: 2021-09-26 | End: 2021-08-11

## 2021-08-11 RX ORDER — DEXTROAMPHETAMINE SACCHARATE, AMPHETAMINE ASPARTATE, DEXTROAMPHETAMINE SULFATE AND AMPHETAMINE SULFATE 2.5; 2.5; 2.5; 2.5 MG/1; MG/1; MG/1; MG/1
10 TABLET ORAL DAILY
Qty: 30 TABLET | Refills: 0 | Status: SHIPPED | OUTPATIENT
Start: 2021-09-26 | End: 2021-08-11

## 2021-08-11 RX ORDER — DEXTROAMPHETAMINE SACCHARATE, AMPHETAMINE ASPARTATE, DEXTROAMPHETAMINE SULFATE AND AMPHETAMINE SULFATE 2.5; 2.5; 2.5; 2.5 MG/1; MG/1; MG/1; MG/1
10 TABLET ORAL DAILY
Qty: 30 TABLET | Refills: 0 | Status: SHIPPED | OUTPATIENT
Start: 2021-10-26 | End: 2021-12-03

## 2021-08-11 RX ORDER — DEXTROAMPHETAMINE SACCHARATE, AMPHETAMINE ASPARTATE, DEXTROAMPHETAMINE SULFATE AND AMPHETAMINE SULFATE 2.5; 2.5; 2.5; 2.5 MG/1; MG/1; MG/1; MG/1
10 TABLET ORAL DAILY
Qty: 30 TABLET | Refills: 0 | Status: SHIPPED | OUTPATIENT
Start: 2021-08-27 | End: 2021-08-11

## 2021-08-11 RX ORDER — DEXTROAMPHETAMINE SACCHARATE, AMPHETAMINE ASPARTATE MONOHYDRATE, DEXTROAMPHETAMINE SULFATE AND AMPHETAMINE SULFATE 7.5; 7.5; 7.5; 7.5 MG/1; MG/1; MG/1; MG/1
30 CAPSULE, EXTENDED RELEASE ORAL DAILY
Qty: 30 CAPSULE | Refills: 0 | Status: SHIPPED | OUTPATIENT
Start: 2021-08-27 | End: 2021-08-11

## 2021-08-11 ASSESSMENT — MIFFLIN-ST. JEOR: SCORE: 1741.34

## 2021-08-11 NOTE — PROGRESS NOTES
"    Assessment & Plan     Attention deficit hyperactivity disorder (ADHD), predominantly inattentive type  3 months rx given. Discussed it is his responsibility to scheduled follow up appt before he runs out of rx. Offered it today - he will schedule on his own.   - amphetamine-dextroamphetamine (ADDERALL XR) 30 MG 24 hr capsule; Take 1 capsule (30 mg) by mouth daily  - amphetamine-dextroamphetamine (ADDERALL) 10 MG tablet; Take 1 tablet (10 mg) by mouth daily    Plantar warts  Home care with compound w discussed. Cryotherapy done.              BMI:   Estimated body mass index is 26.72 kg/m  as calculated from the following:    Height as of this encounter: 1.734 m (5' 8.25\").    Weight as of this encounter: 80.3 kg (177 lb).           No follow-ups on file.    Vish Michael MD, MD  [facility]    Subjective   TK is a 31 year old who presents for the following health issues     HPI     Medication Followup of Adderall     Taking Medication as prescribed: yes    Side Effects:  None    Medication Helping Symptoms:  yes     Warts  Onset/Duration: 3-4 months   Description (location/number): 1 on bottom left foot  Accompanying signs and symptoms (pain, redness):  no   History: prior warts: YES  Therapies tried and outcome: duct tape    Left foot - multiple warts. May be 3-4 months.   Used duct tape, apple cidar     Additional: pt would like PT referral for right arm and right knee to [facility].     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         Review of Systems         Objective    /62 (BP Location: Right arm, Patient Position: Sitting, Cuff Size: Adult Regular)   Pulse 68   Temp 97.8  F (36.6  C) (Oral)   Resp 16   Ht 1.734 m (5' 8.25\")   Wt 80.3 kg (177 lb)   SpO2 100%   BMI 26.72 kg/m    Body mass index is 26.72 kg/m .  Physical Exam   Wart - Left foot - 1 middle foot, 3 hear great toe and 2 on great toe.     After verbal consent, discussion of " risks, benefit and complications of cryotherapy all lesions were pared with #15 blade and treated with liquid nitrogen x 2. Patient tolerated procedure well. After procedure instructions given.   Total of 6 lesions treated.

## 2021-08-24 ASSESSMENT — PATIENT HEALTH QUESTIONNAIRE - PHQ9
5. POOR APPETITE OR OVEREATING: NOT AT ALL
SUM OF ALL RESPONSES TO PHQ QUESTIONS 1-9: 0

## 2021-08-24 ASSESSMENT — ANXIETY QUESTIONNAIRES
2. NOT BEING ABLE TO STOP OR CONTROL WORRYING: NOT AT ALL
1. FEELING NERVOUS, ANXIOUS, OR ON EDGE: NOT AT ALL
3. WORRYING TOO MUCH ABOUT DIFFERENT THINGS: NOT AT ALL
7. FEELING AFRAID AS IF SOMETHING AWFUL MIGHT HAPPEN: NOT AT ALL
GAD7 TOTAL SCORE: 0
5. BEING SO RESTLESS THAT IT IS HARD TO SIT STILL: NOT AT ALL
6. BECOMING EASILY ANNOYED OR IRRITABLE: NOT AT ALL

## 2021-08-25 ASSESSMENT — ANXIETY QUESTIONNAIRES: GAD7 TOTAL SCORE: 0

## 2021-09-22 ENCOUNTER — MYC MEDICAL ADVICE (OUTPATIENT)
Dept: FAMILY MEDICINE | Facility: CLINIC | Age: 31
End: 2021-09-22

## 2021-09-24 ENCOUNTER — TELEPHONE (OUTPATIENT)
Dept: FAMILY MEDICINE | Facility: CLINIC | Age: 31
End: 2021-09-24

## 2021-09-24 ENCOUNTER — OFFICE VISIT (OUTPATIENT)
Dept: URGENT CARE | Facility: URGENT CARE | Age: 31
End: 2021-09-24
Payer: COMMERCIAL

## 2021-09-24 VITALS
DIASTOLIC BLOOD PRESSURE: 63 MMHG | HEART RATE: 93 BPM | OXYGEN SATURATION: 100 % | SYSTOLIC BLOOD PRESSURE: 105 MMHG | WEIGHT: 177 LBS | BODY MASS INDEX: 26.72 KG/M2

## 2021-09-24 DIAGNOSIS — K61.2 ABSCESS OF ANAL OR RECTAL REGION: ICD-10-CM

## 2021-09-24 DIAGNOSIS — K61.2 ABSCESS OF ANAL OR RECTAL REGION: Primary | ICD-10-CM

## 2021-09-24 PROCEDURE — 99213 OFFICE O/P EST LOW 20 MIN: CPT | Performed by: PHYSICIAN ASSISTANT

## 2021-09-24 RX ORDER — SULFAMETHOXAZOLE/TRIMETHOPRIM 800-160 MG
1 TABLET ORAL 2 TIMES DAILY
Qty: 20 TABLET | Refills: 0 | Status: SHIPPED | OUTPATIENT
Start: 2021-09-24 | End: 2021-10-04

## 2021-09-24 ASSESSMENT — ENCOUNTER SYMPTOMS
MUSCULOSKELETAL NEGATIVE: 1
RESPIRATORY NEGATIVE: 1
GASTROINTESTINAL NEGATIVE: 1
CONSTITUTIONAL NEGATIVE: 1
PSYCHIATRIC NEGATIVE: 1
NEUROLOGICAL NEGATIVE: 1
EYES NEGATIVE: 1
CARDIOVASCULAR NEGATIVE: 1

## 2021-09-24 NOTE — TELEPHONE ENCOUNTER
Pt calling [place] in  [place]  and [facility] does not carry his rx for procto-foam. He called around to other [facility] locations also and they do not have it either.    Called  pharm and they can order it for Monday.    Called [facility] [address] spent over 15 minutes waiting to speak to them.  Pharm said they do have it in stock.    Gave verbal to pharmacist and resent order to them.  Pt aware.    Jenniffer Shaw RN

## 2021-09-24 NOTE — TELEPHONE ENCOUNTER
Responded to patient via [employer]    Faxed letter to correct, updated fax number.     MARCIO RolandN RN  [facility]

## 2021-09-24 NOTE — PATIENT INSTRUCTIONS
Patient Education     Perianal Abscess, Antibiotic Treatment  Glands near the anus can become blocked. This can lead to infection. If the infection can't drain, a collection of pus called an abscess may form. Symptoms of an abscess include anal or rectal pain, itching, swelling, and fever. Frequently the abscess results in a fistula, which is an abnormal connection between the abscess and the skin where pus drains. A fistula may sometimes be seen on exam and may require other testing and treatments.   Your healthcare provider will likely drain the abscess. In some cases, they will also prescribe antibiotics. People with artificial valves, diabetes, weak immune systems, and certain other conditions always need antibiotics.   Home care    Abscesses are almost always drained. Follow any instructions from your provider about care of the incision site.    If you are prescribed antibiotics, take them exactly as prescribed. Take all of the antibiotic medicine as prescribed. Continue it even if you start feeling better. Finish all of the medicine unless your healthcare provider tells you to stop.    Try sitz baths. Sit in a tub filled with about 6 inches of hot water for 15 to 30 minutes. Test the water temperature before sitting down to ensure it won't burn you. Repeat this twice a day until pain is relieved.    Unless a pain medicine has been prescribed, you may take an over-the-counter medicine, such as ibuprofen, for pain.     Passing stools may be painful. If so, ask your healthcare provider about using a stool-softener for a short time. It's also helpful to slowly add fiber to your diet or take a fiber supplement.    Follow-up care  Follow up with your healthcare provider, or as advised.   When to get medical advice  Call your healthcare provider right away if any of these occur:     Increasing pain, swelling, or redness    Pus draining from the abscess    Fever of 100.4 F (38 C) or higher that continues for a day  after starting antibiotics, or as directed by your healthcare provider    Other symptoms such as rectal bleeding, belly pain, or chronic diarrhea. Your provider will evaluate if the abscess may be a sign of other health conditions.    Symptoms get worse, or you have new symptoms  Michela last reviewed this educational content on 2/1/2021 2000-2021 [employer]. All rights reserved. This information is not intended as a substitute for professional medical care. Always follow your healthcare professional's instructions.

## 2021-09-24 NOTE — PROGRESS NOTES
Abscess of anal or rectal region  - sulfamethoxazole-trimethoprim (BACTRIM DS) 800-160 MG tablet; Take 1 tablet by mouth 2 times daily for 10 days  - hydrocortisone-pramoxine (PROCTOFOAM-HC) rectal foam; Place 1 Applicatorful rectally 3 times daily  - GASTROENTEROLOGY ADULT REF CONSULT ONLY; Future    20 minutes spent on the date of the encounter doing chart review, history and exam, documentation and further activities per the note     See Patient Instructions  Patient Instructions     Patient Education     Perianal Abscess, Antibiotic Treatment  Glands near the anus can become blocked. This can lead to infection. If the infection can't drain, a collection of pus called an abscess may form. Symptoms of an abscess include anal or rectal pain, itching, swelling, and fever. Frequently the abscess results in a fistula, which is an abnormal connection between the abscess and the skin where pus drains. A fistula may sometimes be seen on exam and may require other testing and treatments.   Your healthcare provider will likely drain the abscess. In some cases, they will also prescribe antibiotics. People with artificial valves, diabetes, weak immune systems, and certain other conditions always need antibiotics.   Home care    Abscesses are almost always drained. Follow any instructions from your provider about care of the incision site.    If you are prescribed antibiotics, take them exactly as prescribed. Take all of the antibiotic medicine as prescribed. Continue it even if you start feeling better. Finish all of the medicine unless your healthcare provider tells you to stop.    Try sitz baths. Sit in a tub filled with about 6 inches of hot water for 15 to 30 minutes. Test the water temperature before sitting down to ensure it won't burn you. Repeat this twice a day until pain is relieved.    Unless a pain medicine has been prescribed, you may take an over-the-counter medicine, such as ibuprofen, for pain.     Passing  stools may be painful. If so, ask your healthcare provider about using a stool-softener for a short time. It's also helpful to slowly add fiber to your diet or take a fiber supplement.    Follow-up care  Follow up with your healthcare provider, or as advised.   When to get medical advice  Call your healthcare provider right away if any of these occur:     Increasing pain, swelling, or redness    Pus draining from the abscess    Fever of 100.4 F (38 C) or higher that continues for a day after starting antibiotics, or as directed by your healthcare provider    Other symptoms such as rectal bleeding, belly pain, or chronic diarrhea. Your provider will evaluate if the abscess may be a sign of other health conditions.    Symptoms get worse, or you have new symptoms  [employer] last reviewed this educational content on 2/1/2021 2000-2021 [employer]. All rights reserved. This information is not intended as a substitute for professional medical care. Always follow your healthcare professional's instructions.               Naeem Rivero PA-C  [facility]    Subjective   31 year old who presents to clinic today for the following health issues:    Urgent Care and Rectal Problem       HPI     Began 4 days ago.     Patient has been having a fullness and pain near his anus that goes up to an 8/10. Denies bleeding. Denies itch. Patient has been having pain with bowel movements. Denies constipation or diarrhea. Patient states that dad has had similar issue. Denies any drainage. Denies fevers or chills.      Review of Systems   Review of Systems   Constitutional: Negative.    HENT: Negative.    Eyes: Negative.    Respiratory: Negative.    Cardiovascular: Negative.    Gastrointestinal: Negative.    Genitourinary: Negative.    Musculoskeletal: Negative.    Neurological: Negative.    Psychiatric/Behavioral: Negative.       See HPI     Objective        BP: 105/63 Pulse: 93     SpO2: 100 %        Physical Exam   Physical Exam  Constitutional:       General: He is not in acute distress.     Appearance: Normal appearance. He is normal weight. He is not ill-appearing, toxic-appearing or diaphoretic.   HENT:      Head: Normocephalic and atraumatic.   Cardiovascular:      Rate and Rhythm: Normal rate.      Pulses: Normal pulses.   Pulmonary:      Effort: Pulmonary effort is normal. No respiratory distress.   Genitourinary:     Comments: There appears to be a small anal mass that seems to be inflamed and fluctuant. It is warm and tender to touch.   Neurological:      General: No focal deficit present.      Mental Status: He is alert and oriented to person, place, and time. Mental status is at baseline.      Gait: Gait normal.   Psychiatric:         Mood and Affect: Mood normal.         Behavior: Behavior normal.         Thought Content: Thought content normal.         Judgment: Judgment normal.          No results found for this or any previous visit (from the past 24 hour(s)).

## 2021-09-24 NOTE — TELEPHONE ENCOUNTER
Patient has what he believes is a boil on his bottom.  Would like to get it looked at today as it is getting harder to sit.  Advised Urgent care at [facility] or [place].  Patient agrees with plan.  Suri Olguin RN  [facility]

## 2021-09-28 ENCOUNTER — TELEPHONE (OUTPATIENT)
Dept: SURGERY | Facility: CLINIC | Age: 31
End: 2021-09-28

## 2021-09-28 ENCOUNTER — OFFICE VISIT (OUTPATIENT)
Dept: SURGERY | Facility: CLINIC | Age: 31
End: 2021-09-28
Payer: COMMERCIAL

## 2021-09-28 ENCOUNTER — PRE VISIT (OUTPATIENT)
Dept: SURGERY | Facility: CLINIC | Age: 31
End: 2021-09-28

## 2021-09-28 VITALS
OXYGEN SATURATION: 100 % | HEART RATE: 71 BPM | WEIGHT: 176.6 LBS | BODY MASS INDEX: 26.76 KG/M2 | SYSTOLIC BLOOD PRESSURE: 118 MMHG | DIASTOLIC BLOOD PRESSURE: 72 MMHG | HEIGHT: 68 IN

## 2021-09-28 DIAGNOSIS — K61.0 PERIANAL ABSCESS: Primary | ICD-10-CM

## 2021-09-28 PROCEDURE — 46050 I&D PERIANAL ABSCESS SUPFC: CPT | Performed by: NURSE PRACTITIONER

## 2021-09-28 ASSESSMENT — MIFFLIN-ST. JEOR: SCORE: 1739.52

## 2021-09-28 ASSESSMENT — PAIN SCALES - GENERAL: PAINLEVEL: EXTREME PAIN (8)

## 2021-09-28 NOTE — TELEPHONE ENCOUNTER
Perianal Abscess    -Are you having any pain? Yes  -Rate pain numerical scale (0-10): 8  -Is the pain during bowel movements or all the time? All the time  -When did the pain start? 9/20  -Is there any drainage coming from the site? no  -Is the draining constant or intermittent? unable to determine  -What color is the drainage? none  -Are you having any rectal bleeding? none  -When did the bleeding start? none  -Is the bleeding constant or intermittent? none  -What is the amount of rectal bleeding? none  -Is it just on the stool, is it on the toilet paper, is it making the toilet bowl dark ? none  -Do you feel a bump/tissue coming out of your anus? none  -Is it hard or soft? hard  -Are you able to push it back inside? no  -Are you having any fever, chills, nausea, or vomiting? No  -How are your bowel movements? Soft  -Are you taking anything for your stool, such as fiber, Miralax, Stool softener, etc? No  -Have you had a colonoscopy recently? no  -When was the last time you had a colonoscopy? never      Appointment date/time: 3pm 9/28/21    Advise to do warm tub baths, preparation H cream, keep bowel movements soft and avoid straining with bowel movements. Use over the counter medication such as Tylenol or lidocaine ointment to help with pain and discomfort.    Patient will follow up as scheduled.    Neena Cerrato RN

## 2021-09-28 NOTE — TELEPHONE ENCOUNTER
RECORDS RECEIVED FROM: Rectal Abcess   Appt date: 9/28/2021   NOTES STATUS DETAILS   OFFICE NOTE from referring provider  Internal [facility]:  9/24/21 - UC OV with EMANUEL Sommers   OFFICE NOTE from other specialist   N/A    DISCHARGE SUMMARY from hospital  N/A    DISCHARGE REPORT from the ER N/A    OPERATIVE REPORT  N/A    MEDICATION LIST Internal    LABS     PFC TESTING N/A    ANAL PAP N/A    BIOPSIES/PATHOLOGY RELATED TO DIAGNOSIS N/A    DIAGNOSTIC PROCEDURES     COLONOSCOPY N/A    UPPER ENDOSCOPY (EGD) N/A    FLEX SIGMOIDOSCOPY  N/A    ERCP N/A    IMAGING (DISC & REPORT)      CT  N/A    MRI N/A    XRAY N/A    ULTRASOUND (ENDOANAL/ENDORECTAL) N/A

## 2021-09-28 NOTE — TELEPHONE ENCOUNTER
[facility] Call Center    Phone Message    May a detailed message be left on voicemail: yes     Reason for Call: Other: Pt is beinf referred for an anal abscess, per scheduling GL's must send HP TE. Please advise. Thank you!     Action Taken: Message routed to:  [facility] Rectal     Travel Screening: Not Applicable

## 2021-09-28 NOTE — PROGRESS NOTES
"Colon and Rectal Surgery Consult Clinic Note    Date: 2021     Referring provider:  Naeem Rivero PA-C  [address] [place] [address]     RE: Cinda Peterson  : 1990  NEISHA: 2021    Cinda Peterson is a very pleasant 31 year old male who presents today for perianal abscess.    HPI:  Cinda developed a painful perianal bump Wednesday of last week.  He was in the emergency room on Friday and was started on antibiotics.  His pain has improved and is mostly with sitting now.  He has to adjust his position due to discomfort.  No fevers or chills.  No drainage.  Has never had anything like this in the past.  However, he reports that his father had a recurrent perianal abscess.  No known family history of any inflammatory bowel disease.  He does not have any difficulty with bowel movements.    Physical Examination: Exam was chaperoned by Winnie Molina MA  /72   Pulse 71   Ht 5' 8.25\"   Wt 176 lb 9.6 oz   SpO2 100%   BMI 26.66 kg/m    General: Alert, oriented, no acute distress, leaning to 1 side when sitting  HEENT: Mucous membranes moist  Perianal external examination:  Induration and fluctuance in the left anterior position.  Tender on palpation.  No erythema.    Digital rectal examination: Was deferred    Anoscopy: Was deferred    Procedures:  Prior to the start of the procedure and with procedural staff participation, I verbally confirmed the patient s identity using two indicators, relevant allergies, that the procedure was appropriate and matched the consent or emergent situation, and that the correct equipment/implants were available. Immediately prior to starting the procedure I conducted the Time Out with the procedural staff and re-confirmed the patient s name, procedure, and site/side. (The Joint Commission universal protocol was followed.)  Yes    Sedation (Moderate or Deep): None    Effected area cleaned with betadine. 1% lidocaine with epineprhine " used to infiltrate the area with good anethesia. Scapel used to make a cruciate incision and edges trimmed. A small amount of purulent drainage was removed. No gauze was needed for packing as area was small. External gauze dressing applied. Pt tolerated preocedure well. No additional loculations noted.      Assessment/Plan: 31 year old male with perianal abscess.  This seems to be resolving with oral antibiotics but he did have a residual abscess cavity.  I open the overlying skin and only a small amount of purulent drainage present but there was a small cavity.  Would like him to do warm tub baths several times a day to help facilitate drainage.  No packing needed.  We will have him follow-up in 1 week.  Discussed that if this does not heal or becomes recurrent, that we would need to do further evaluation for possible anal fistula.  However, if this heals and does not recur, no further work-up is needed.  Encouraged him to contact the clinic in the meantime if he develops any worsening symptoms, questions, or concerns. Patient's questions were answered to his stated satisfaction and he is in agreement with this plan.    Medical history:  Past Medical History:   Diagnosis Date     Adult ADHD      H/O vitamin D deficiency        Surgical history:  No past surgical history on file.    Problem list:  Patient Active Problem List    Diagnosis Date Noted     Neck pain 09/15/2020     Priority: Medium     Shoulder pain, right 09/15/2020     Priority: Medium     Shoulder pain, left 09/15/2020     Priority: Medium     Medial epicondylitis of elbow, left 02/12/2020     Priority: Medium     TB lung, latent 09/23/2019     Priority: Medium     Mild intermittent asthma without complication 04/30/2018     Priority: Medium     H/O vitamin D deficiency      Priority: Medium     Attention deficit hyperactivity disorder (ADHD), predominantly inattentive type      Priority: Medium       Medications:  Current Outpatient Medications  "  Medication Sig Dispense Refill     albuterol (VENTOLIN HFA) 108 (90 Base) MCG/ACT inhaler Inhale 2 puffs into the lungs 4 times daily 18 g 3     [START ON 10/26/2021] amphetamine-dextroamphetamine (ADDERALL XR) 30 MG 24 hr capsule Take 1 capsule (30 mg) by mouth daily 30 capsule 0     [START ON 10/26/2021] amphetamine-dextroamphetamine (ADDERALL) 10 MG tablet Take 1 tablet (10 mg) by mouth daily 30 tablet 0     cholecalciferol (VITAMIN D3) 1000 UNIT tablet Take 1,000 Units by mouth daily       fluticasone (FLOVENT HFA) 44 MCG/ACT inhaler Inhale 1 puff into the lungs 2 times daily 30 g 0     hydrocortisone-pramoxine (PROCTOFOAM-HC) rectal foam Place 1 Applicatorful rectally 3 times daily 10 g 0     Omega-3 Fatty Acids (OMEGA-3 PLUS) 1000 MG CAPS Take 1,000 mg by mouth daily       sulfamethoxazole-trimethoprim (BACTRIM DS) 800-160 MG tablet Take 1 tablet by mouth 2 times daily for 10 days 20 tablet 0       Allergies:  No Known Allergies    Family history:  Family History   Problem Relation Age of Onset     No Known Problems Mother      No Known Problems Father      No Known Problems Maternal Grandmother        Social history:  Social History     Tobacco Use     Smoking status: Never Smoker     Smokeless tobacco: Never Used   Substance Use Topics     Alcohol use: No     Alcohol/week: 0.0 standard drinks     Comment: quit drinking    Marital status: single.  Nursing Notes:   Winnie Molina  9/28/2021  3:01 PM  Signed  Chief Complaint   Patient presents with     Abscess     rectal abcess       Vitals:    09/28/21 1458   BP: 118/72   Pulse: 71   SpO2: 100%   Weight: 176 lb 9.6 oz   Height: 5' 8.25\"       Body mass index is 26.66 kg/m .    Winnie Molina CMA         30 minutes spent on the date of the encounter doing chart review, history and exam, documentation and further activities as noted above.     KORIN Sánchez, NP-C  Colon and Rectal Surgery   [facility]    This " note was created using speech recognition software and may contain unintended word substitutions.

## 2021-09-28 NOTE — NURSING NOTE
"Chief Complaint   Patient presents with     Abscess     rectal abcess       Vitals:    09/28/21 1458   BP: 118/72   Pulse: 71   SpO2: 100%   Weight: 176 lb 9.6 oz   Height: 5' 8.25\"       Body mass index is 26.66 kg/m .    Winnie Molina CMA    "

## 2021-09-28 NOTE — LETTER
"2021       RE: Cinda Peterson  [address]  [place] [address]     Dear Colleague,    Thank you for referring your patient, Cinda Peterson, to the [facility] COLON AND RECTAL SURGERY CLINIC [place] at [facility]. Please see a copy of my visit note below.    Colon and Rectal Surgery Consult Clinic Note    Date: 2021     Referring provider:  Naeem Rivero PA-C  [address] [place] [id number]     RE: Cinda Peterson  : 1990  NEISHA: 2021    Cinda Peterson is a very pleasant 31 year old male who presents today for perianal abscess.    HPI:  Cinda developed a painful perianal bump Wednesday of last week.  He was in the emergency room on Friday and was started on antibiotics.  His pain has improved and is mostly with sitting now.  He has to adjust his position due to discomfort.  No fevers or chills.  No drainage.  Has never had anything like this in the past.  However, he reports that his father had a recurrent perianal abscess.  No known family history of any inflammatory bowel disease.  He does not have any difficulty with bowel movements.    Physical Examination: Exam was chaperoned by Winnie Molina MA  /72   Pulse 71   Ht 5' 8.25\"   Wt 176 lb 9.6 oz   SpO2 100%   BMI 26.66 kg/m    General: Alert, oriented, no acute distress, leaning to 1 side when sitting  HEENT: Mucous membranes moist  Perianal external examination:  Induration and fluctuance in the left anterior position.  Tender on palpation.  No erythema.    Digital rectal examination: Was deferred    Anoscopy: Was deferred    Procedures:  Prior to the start of the procedure and with procedural staff participation, I verbally confirmed the patient s identity using two indicators, relevant allergies, that the procedure was appropriate and matched the consent or emergent situation, and that the correct " equipment/implants were available. Immediately prior to starting the procedure I conducted the Time Out with the procedural staff and re-confirmed the patient s name, procedure, and site/side. (The Joint Commission universal protocol was followed.)  Yes    Sedation (Moderate or Deep): None    Effected area cleaned with betadine. 1% lidocaine with epineprhine used to infiltrate the area with good anethesia. Scapel used to make a cruciate incision and edges trimmed. A small amount of purulent drainage was removed. No gauze was needed for packing as area was small. External gauze dressing applied. Pt tolerated preocedure well. No additional loculations noted.      Assessment/Plan: 31 year old male with perianal abscess.  This seems to be resolving with oral antibiotics but he did have a residual abscess cavity.  I open the overlying skin and only a small amount of purulent drainage present but there was a small cavity.  Would like him to do warm tub baths several times a day to help facilitate drainage.  No packing needed.  We will have him follow-up in 1 week.  Discussed that if this does not heal or becomes recurrent, that we would need to do further evaluation for possible anal fistula.  However, if this heals and does not recur, no further work-up is needed.  Encouraged him to contact the clinic in the meantime if he develops any worsening symptoms, questions, or concerns. Patient's questions were answered to his stated satisfaction and he is in agreement with this plan.    Medical history:  Past Medical History:   Diagnosis Date     Adult ADHD      H/O vitamin D deficiency        Surgical history:  No past surgical history on file.    Problem list:  Patient Active Problem List    Diagnosis Date Noted     Neck pain 09/15/2020     Priority: Medium     Shoulder pain, right 09/15/2020     Priority: Medium     Shoulder pain, left 09/15/2020     Priority: Medium     Medial epicondylitis of elbow, left 02/12/2020      Priority: Medium     TB lung, latent 09/23/2019     Priority: Medium     Mild intermittent asthma without complication 04/30/2018     Priority: Medium     H/O vitamin D deficiency      Priority: Medium     Attention deficit hyperactivity disorder (ADHD), predominantly inattentive type      Priority: Medium       Medications:  Current Outpatient Medications   Medication Sig Dispense Refill     albuterol (VENTOLIN HFA) 108 (90 Base) MCG/ACT inhaler Inhale 2 puffs into the lungs 4 times daily 18 g 3     [START ON 10/26/2021] amphetamine-dextroamphetamine (ADDERALL XR) 30 MG 24 hr capsule Take 1 capsule (30 mg) by mouth daily 30 capsule 0     [START ON 10/26/2021] amphetamine-dextroamphetamine (ADDERALL) 10 MG tablet Take 1 tablet (10 mg) by mouth daily 30 tablet 0     cholecalciferol (VITAMIN D3) 1000 UNIT tablet Take 1,000 Units by mouth daily       fluticasone (FLOVENT HFA) 44 MCG/ACT inhaler Inhale 1 puff into the lungs 2 times daily 30 g 0     hydrocortisone-pramoxine (PROCTOFOAM-HC) rectal foam Place 1 Applicatorful rectally 3 times daily 10 g 0     Omega-3 Fatty Acids (OMEGA-3 PLUS) 1000 MG CAPS Take 1,000 mg by mouth daily       sulfamethoxazole-trimethoprim (BACTRIM DS) 800-160 MG tablet Take 1 tablet by mouth 2 times daily for 10 days 20 tablet 0       Allergies:  No Known Allergies    Family history:  Family History   Problem Relation Age of Onset     No Known Problems Mother      No Known Problems Father      No Known Problems Maternal Grandmother        Social history:  Social History     Tobacco Use     Smoking status: Never Smoker     Smokeless tobacco: Never Used   Substance Use Topics     Alcohol use: No     Alcohol/week: 0.0 standard drinks     Comment: quit drinking    Marital status: single.  Nursing Notes:   Winnie Molina  9/28/2021  3:01 PM  Signed  Chief Complaint   Patient presents with     Abscess     rectal abcess       Vitals:    09/28/21 1458   BP: 118/72   Pulse: 71   SpO2: 100%   Weight:  "176 lb 9.6 oz   Height: 5' 8.25\"       Body mass index is 26.66 kg/m .    Winnie Molina CMA         30 minutes spent on the date of the encounter doing chart review, history and exam, documentation and further activities as noted above.     KORIN Sánchez, NP-C  Colon and Rectal Surgery   [facility]    This note was created using speech recognition software and may contain unintended word substitutions.        Again, thank you for allowing me to participate in the care of your patient.      Sincerely,    KORIN Sánchez CNP      "

## 2021-10-02 ENCOUNTER — HEALTH MAINTENANCE LETTER (OUTPATIENT)
Age: 31
End: 2021-10-02

## 2021-10-08 ENCOUNTER — TELEPHONE (OUTPATIENT)
Dept: SURGERY | Facility: CLINIC | Age: 31
End: 2021-10-08

## 2021-10-08 NOTE — TELEPHONE ENCOUNTER
[facility] Call Center    Phone Message    May a detailed message be left on voicemail: yes     Reason for Call: Other: Patient is calling in asking for a call back. He states that he had something come up and will have trouble making it to his 10/8 appt at 12:15, and is wondering if it can be moved later in the day or rescheduled to next week. Writer had no available appts to offer. Please call back as soon as possible to discuss.     Action Taken: Message routed to:  [facility] Colon/Rectal    Travel Screening: Not Applicable

## 2021-10-13 ENCOUNTER — TELEPHONE (OUTPATIENT)
Dept: SURGERY | Facility: CLINIC | Age: 31
End: 2021-10-13

## 2021-10-13 NOTE — TELEPHONE ENCOUNTER
[facility]    Phone Message    May a detailed message be left on voicemail: yes     Reason for Call: Other:       TK calling in appologizing for missing his appt this morning.  He thought he was scheduled for 12pm.  He is trying to get this rescheduled asap.  I told him next available is in December and he asked to speak to the care team to get something scheduled sooner.       Action Taken: Message routed to:  [facility] ([facility] CRS    Travel Screening: Not Applicable

## 2021-10-14 NOTE — TELEPHONE ENCOUNTER
Called pt in regards message below. Pt did not answer the phone. Left a detailed VM with call back number.    Ninoska Smith CMA

## 2021-10-15 ENCOUNTER — MYC MEDICAL ADVICE (OUTPATIENT)
Dept: SURGERY | Facility: CLINIC | Age: 31
End: 2021-10-15

## 2021-10-19 NOTE — TELEPHONE ENCOUNTER
Pt called back. Rescheduled pt for October 25, 2021 at 11:30AM with YO Sánchez. Pt will follow as scheduled.    Ninoska Smith CMA

## 2021-10-25 ENCOUNTER — OFFICE VISIT (OUTPATIENT)
Dept: SURGERY | Facility: CLINIC | Age: 31
End: 2021-10-25
Payer: COMMERCIAL

## 2021-10-25 VITALS
BODY MASS INDEX: 27.83 KG/M2 | HEIGHT: 68 IN | TEMPERATURE: 97.8 F | OXYGEN SATURATION: 100 % | SYSTOLIC BLOOD PRESSURE: 124 MMHG | HEART RATE: 85 BPM | DIASTOLIC BLOOD PRESSURE: 69 MMHG | WEIGHT: 183.6 LBS

## 2021-10-25 DIAGNOSIS — K61.0 PERIANAL ABSCESS: Primary | ICD-10-CM

## 2021-10-25 PROCEDURE — 99212 OFFICE O/P EST SF 10 MIN: CPT | Performed by: NURSE PRACTITIONER

## 2021-10-25 ASSESSMENT — MIFFLIN-ST. JEOR: SCORE: 1771.27

## 2021-10-25 ASSESSMENT — PAIN SCALES - GENERAL: PAINLEVEL: NO PAIN (0)

## 2021-10-25 NOTE — NURSING NOTE
"Chief Complaint   Patient presents with     Follow Up     Follow up after I&D of perianal abscess       Vitals:    10/25/21 1119   BP: 124/69   BP Location: Left arm   Patient Position: Sitting   Cuff Size: Adult Regular   Pulse: 85   Temp: 97.8  F (36.6  C)   TempSrc: Oral   SpO2: 100%   Weight: 183 lb 9.6 oz   Height: 5' 8.25\"       Body mass index is 27.71 kg/m .          Ninoska Smith CMA    "

## 2021-10-25 NOTE — PROGRESS NOTES
"Colon and Rectal Surgery Follow-Up Clinic Note    RE: Cinda Peterson  : 1990  NEISHA: 10/25/2021    Cinda Peterson is a very pleasant 31 year old male here for follow-up of perianal abscess.    Interval history: I saw RUTHY about one month ago with I&D of perianal abscess. He has been doing well since then. He feels he has healed but can feel a small bump at the site. No drainage. No pain. No difficulty with bowel movements. His father had an anal fistula and he is wondering if that would be a concern. No known family history of any inflammatory bowel disease.      Physical Examination: Exam was chaperoned by Ninoska Mena MA   /69 (BP Location: Left arm, Patient Position: Sitting, Cuff Size: Adult Regular)   Pulse 85   Temp 97.8  F (36.6  C) (Oral)   Ht 5' 8.25\"   Wt 183 lb 9.6 oz   SpO2 100%   BMI 27.71 kg/m    General: alert, oriented, in no acute distress, sitting comfortably  HEENT: mucous membranes moist    Perianal external examination:  Small scar present in the left anterior position. This is slightly firm with a firm tract extending from this to the right.     Digital rectal examination: Was deferred.    Anoscopy: Was deferred.    Assessment/Plan: 31 year old male s/p I&D of perianal abscess. Appears healed on exam but some firm scarring present. Discussed that this could be scarring but also discussed the possibility of a fistula tract. If abscess recurs, would need to evaluate for fistula. However, he is currently asymptomatic and if he remains so, no further intervention needed. Patient's questions were answered to his stated satisfaction and he is in agreement with this plan.      Medical history:  Past Medical History:   Diagnosis Date     Adult ADHD      H/O vitamin D deficiency        Surgical history:  No past surgical history on file.    Problem list:  Patient Active Problem List    Diagnosis Date Noted     Neck pain 09/15/2020     Priority: Medium     " Shoulder pain, right 09/15/2020     Priority: Medium     Shoulder pain, left 09/15/2020     Priority: Medium     Medial epicondylitis of elbow, left 02/12/2020     Priority: Medium     TB lung, latent 09/23/2019     Priority: Medium     Mild intermittent asthma without complication 04/30/2018     Priority: Medium     H/O vitamin D deficiency      Priority: Medium     Attention deficit hyperactivity disorder (ADHD), predominantly inattentive type      Priority: Medium       Medications:  Current Outpatient Medications   Medication Sig Dispense Refill     albuterol (VENTOLIN HFA) 108 (90 Base) MCG/ACT inhaler Inhale 2 puffs into the lungs 4 times daily 18 g 3     [START ON 10/26/2021] amphetamine-dextroamphetamine (ADDERALL XR) 30 MG 24 hr capsule Take 1 capsule (30 mg) by mouth daily 30 capsule 0     [START ON 10/26/2021] amphetamine-dextroamphetamine (ADDERALL) 10 MG tablet Take 1 tablet (10 mg) by mouth daily 30 tablet 0     cholecalciferol (VITAMIN D3) 1000 UNIT tablet Take 1,000 Units by mouth daily       fluticasone (FLOVENT HFA) 44 MCG/ACT inhaler Inhale 1 puff into the lungs 2 times daily 30 g 0     hydrocortisone-pramoxine (PROCTOFOAM-HC) rectal foam Place 1 Applicatorful rectally 3 times daily 10 g 0     Omega-3 Fatty Acids (OMEGA-3 PLUS) 1000 MG CAPS Take 1,000 mg by mouth daily         Allergies:  No Known Allergies    Family history:  Family History   Problem Relation Age of Onset     No Known Problems Mother      No Known Problems Father      No Known Problems Maternal Grandmother        Social history:  Social History     Tobacco Use     Smoking status: Never Smoker     Smokeless tobacco: Never Used   Substance Use Topics     Alcohol use: No     Alcohol/week: 0.0 standard drinks     Comment: quit drinking     Marital status: single.    Nursing Notes:   Ninoska Keenan CMA  10/25/2021 11:22 AM  Signed  Chief Complaint   Patient presents with     Follow Up     Follow up after I&D of perianal  "abscess       Vitals:    10/25/21 1119   BP: 124/69   BP Location: Left arm   Patient Position: Sitting   Cuff Size: Adult Regular   Pulse: 85   Temp: 97.8  F (36.6  C)   TempSrc: Oral   SpO2: 100%   Weight: 183 lb 9.6 oz   Height: 5' 8.25\"       Body mass index is 27.71 kg/m .          Ninoska Smith CMA         15 minutes spent on the date of the encounter doing chart review, history and exam, documentation and further activities as noted above.     Agata Pichardo NP-C  Colon and Rectal Surgery  [facility]    "

## 2021-10-25 NOTE — LETTER
"10/25/2021       RE: Cinda Peterson  [address]  [place] [address]     Dear Colleague,    Thank you for referring your patient, Cinda Peterson, to the [facility] COLON AND RECTAL SURGERY CLINIC [place] at [facility]. Please see a copy of my visit note below.    Colon and Rectal Surgery Follow-Up Clinic Note    RE: Cinda Peterson  : 1990  NEISHA: 10/25/2021    Cinda Peterson is a very pleasant 31 year old male here for follow-up of perianal abscess.    Interval history: I saw TK about one month ago with I&D of perianal abscess. He has been doing well since then. He feels he has healed but can feel a small bump at the site. No drainage. No pain. No difficulty with bowel movements. His father had an anal fistula and he is wondering if that would be a concern. No known family history of any inflammatory bowel disease.      Physical Examination: Exam was chaperoned by Ninoska Mena MA   /69 (BP Location: Left arm, Patient Position: Sitting, Cuff Size: Adult Regular)   Pulse 85   Temp 97.8  F (36.6  C) (Oral)   Ht 5' 8.25\"   Wt 183 lb 9.6 oz   SpO2 100%   BMI 27.71 kg/m    General: alert, oriented, in no acute distress, sitting comfortably  HEENT: mucous membranes moist    Perianal external examination:  Small scar present in the left anterior position. This is slightly firm with a firm tract extending from this to the right.     Digital rectal examination: Was deferred.    Anoscopy: Was deferred.    Assessment/Plan: 31 year old male s/p I&D of perianal abscess. Appears healed on exam but some firm scarring present. Discussed that this could be scarring but also discussed the possibility of a fistula tract. If abscess recurs, would need to evaluate for fistula. However, he is currently asymptomatic and if he remains so, no further intervention needed. Patient's questions were answered " to his stated satisfaction and he is in agreement with this plan.      Medical history:  Past Medical History:   Diagnosis Date     Adult ADHD      H/O vitamin D deficiency        Surgical history:  No past surgical history on file.    Problem list:  Patient Active Problem List    Diagnosis Date Noted     Neck pain 09/15/2020     Priority: Medium     Shoulder pain, right 09/15/2020     Priority: Medium     Shoulder pain, left 09/15/2020     Priority: Medium     Medial epicondylitis of elbow, left 02/12/2020     Priority: Medium     TB lung, latent 09/23/2019     Priority: Medium     Mild intermittent asthma without complication 04/30/2018     Priority: Medium     H/O vitamin D deficiency      Priority: Medium     Attention deficit hyperactivity disorder (ADHD), predominantly inattentive type      Priority: Medium       Medications:  Current Outpatient Medications   Medication Sig Dispense Refill     albuterol (VENTOLIN HFA) 108 (90 Base) MCG/ACT inhaler Inhale 2 puffs into the lungs 4 times daily 18 g 3     [START ON 10/26/2021] amphetamine-dextroamphetamine (ADDERALL XR) 30 MG 24 hr capsule Take 1 capsule (30 mg) by mouth daily 30 capsule 0     [START ON 10/26/2021] amphetamine-dextroamphetamine (ADDERALL) 10 MG tablet Take 1 tablet (10 mg) by mouth daily 30 tablet 0     cholecalciferol (VITAMIN D3) 1000 UNIT tablet Take 1,000 Units by mouth daily       fluticasone (FLOVENT HFA) 44 MCG/ACT inhaler Inhale 1 puff into the lungs 2 times daily 30 g 0     hydrocortisone-pramoxine (PROCTOFOAM-HC) rectal foam Place 1 Applicatorful rectally 3 times daily 10 g 0     Omega-3 Fatty Acids (OMEGA-3 PLUS) 1000 MG CAPS Take 1,000 mg by mouth daily         Allergies:  No Known Allergies    Family history:  Family History   Problem Relation Age of Onset     No Known Problems Mother      No Known Problems Father      No Known Problems Maternal Grandmother        Social history:  Social History     Tobacco Use     Smoking status:  "Never Smoker     Smokeless tobacco: Never Used   Substance Use Topics     Alcohol use: No     Alcohol/week: 0.0 standard drinks     Comment: quit drinking     Marital status: single.    Nursing Notes:   Ninoska Keenan CMA  10/25/2021 11:22 AM  Signed  Chief Complaint   Patient presents with     Follow Up     Follow up after I&D of perianal abscess       Vitals:    10/25/21 1119   BP: 124/69   BP Location: Left arm   Patient Position: Sitting   Cuff Size: Adult Regular   Pulse: 85   Temp: 97.8  F (36.6  C)   TempSrc: Oral   SpO2: 100%   Weight: 183 lb 9.6 oz   Height: 5' 8.25\"       Body mass index is 27.71 kg/m .          Ninoska Smith CMA         15 minutes spent on the date of the encounter doing chart review, history and exam, documentation and further activities as noted above.     RADAMES SánchezC  Colon and Rectal Surgery  [facility]        Again, thank you for allowing me to participate in the care of your patient.      Sincerely,    Agata Fabian, APRN CNP      "

## 2021-11-11 NOTE — PROGRESS NOTES
DISCHARGE SUMMARY    Cinda Archerceferinoirene was seen   times for evaluation and treatment.  Patient did not return for further treatment and current status is unknown.  Due to short treatment duration, no objective or functional changes were made.  Please see goal flow sheet from episode noted date below and initial evaluation for further information.  Patient is discharged from therapy and therapy episode is resolved as of 11/11/21.      No linked episodes

## 2021-11-15 ENCOUNTER — TELEPHONE (OUTPATIENT)
Dept: FAMILY MEDICINE | Facility: CLINIC | Age: 31
End: 2021-11-15
Payer: COMMERCIAL

## 2021-11-15 NOTE — TELEPHONE ENCOUNTER
Reason for Call: Request for an order or referral:    Order or referral being requested: PT for Knee    Date needed: as soon as possible    Has the patient been seen by the PCP for this problem? YES    Additional comments: [facility] is calling and requesting a referral be placed for PT with them. I see that it was discussed at the last appointment but I do not see the actual referral. Please advise, thank you!     Fax # [phone]    Call taken on 11/15/2021 at 12:09 PM by Patty Bee

## 2021-11-15 NOTE — TELEPHONE ENCOUNTER
Alexsander Sin presents to the clinic today for  removal of sutures.  The patient has had the sutures in place for 10 days.    There has been no history of infection or drainage.    O: 6 sutures are seen located on the L Ring finger.  The wound is healing well with no signs of infection.    Tetanus status is up to date.    A: Suture removal.    P:  All sutures were easily removed today.  Routine wound care discussed.  The patient will follow up as needed.       Is [facility] covered?    Thank you

## 2021-11-16 NOTE — TELEPHONE ENCOUNTER
Spoke with [facility], Pt and [facility] [address], [place] [address] Appointment [phone].    Pt does not need any Referral/Order to schedule a Physical Therapy appointment with [facility]. Pt can self-refer.    No action is needed from [facility].    DOTTIE Esteves [facility] Referral Rep

## 2021-11-30 ENCOUNTER — IMMUNIZATION (OUTPATIENT)
Dept: FAMILY MEDICINE | Facility: CLINIC | Age: 31
End: 2021-11-30
Payer: COMMERCIAL

## 2021-11-30 PROCEDURE — 90686 IIV4 VACC NO PRSV 0.5 ML IM: CPT

## 2021-11-30 PROCEDURE — 90471 IMMUNIZATION ADMIN: CPT

## 2021-12-03 ENCOUNTER — VIRTUAL VISIT (OUTPATIENT)
Dept: FAMILY MEDICINE | Facility: CLINIC | Age: 31
End: 2021-12-03
Payer: COMMERCIAL

## 2021-12-03 ENCOUNTER — TELEPHONE (OUTPATIENT)
Dept: FAMILY MEDICINE | Facility: CLINIC | Age: 31
End: 2021-12-03

## 2021-12-03 DIAGNOSIS — F90.0 ATTENTION DEFICIT HYPERACTIVITY DISORDER (ADHD), PREDOMINANTLY INATTENTIVE TYPE: ICD-10-CM

## 2021-12-03 DIAGNOSIS — F33.1 MODERATE EPISODE OF RECURRENT MAJOR DEPRESSIVE DISORDER (H): ICD-10-CM

## 2021-12-03 DIAGNOSIS — G89.29 CHRONIC PAIN OF RIGHT KNEE: ICD-10-CM

## 2021-12-03 DIAGNOSIS — M25.561 CHRONIC PAIN OF RIGHT KNEE: ICD-10-CM

## 2021-12-03 DIAGNOSIS — F33.1 MODERATE EPISODE OF RECURRENT MAJOR DEPRESSIVE DISORDER (H): Primary | ICD-10-CM

## 2021-12-03 DIAGNOSIS — M25.571 PAIN IN JOINT, ANKLE AND FOOT, RIGHT: ICD-10-CM

## 2021-12-03 PROCEDURE — 99214 OFFICE O/P EST MOD 30 MIN: CPT | Mod: 95 | Performed by: FAMILY MEDICINE

## 2021-12-03 RX ORDER — CITALOPRAM HYDROBROMIDE 20 MG/1
TABLET ORAL
Qty: 20 TABLET | Refills: 0 | Status: SHIPPED | OUTPATIENT
Start: 2021-12-03 | End: 2021-12-03

## 2021-12-03 RX ORDER — DEXTROAMPHETAMINE SACCHARATE, AMPHETAMINE ASPARTATE, DEXTROAMPHETAMINE SULFATE AND AMPHETAMINE SULFATE 2.5; 2.5; 2.5; 2.5 MG/1; MG/1; MG/1; MG/1
10 TABLET ORAL DAILY
Qty: 30 TABLET | Refills: 0 | Status: SHIPPED | OUTPATIENT
Start: 2021-12-03 | End: 2022-03-17

## 2021-12-03 RX ORDER — DEXTROAMPHETAMINE SACCHARATE, AMPHETAMINE ASPARTATE MONOHYDRATE, DEXTROAMPHETAMINE SULFATE AND AMPHETAMINE SULFATE 7.5; 7.5; 7.5; 7.5 MG/1; MG/1; MG/1; MG/1
30 CAPSULE, EXTENDED RELEASE ORAL DAILY
Qty: 30 CAPSULE | Refills: 0 | Status: SHIPPED | OUTPATIENT
Start: 2022-02-03 | End: 2022-03-05

## 2021-12-03 RX ORDER — CITALOPRAM HYDROBROMIDE 20 MG/1
TABLET ORAL
Qty: 35 TABLET | Refills: 0 | Status: SHIPPED | OUTPATIENT
Start: 2021-12-03 | End: 2021-12-29

## 2021-12-03 RX ORDER — DEXTROAMPHETAMINE SACCHARATE, AMPHETAMINE ASPARTATE MONOHYDRATE, DEXTROAMPHETAMINE SULFATE AND AMPHETAMINE SULFATE 7.5; 7.5; 7.5; 7.5 MG/1; MG/1; MG/1; MG/1
30 CAPSULE, EXTENDED RELEASE ORAL DAILY
Qty: 30 CAPSULE | Refills: 0 | Status: SHIPPED | OUTPATIENT
Start: 2021-12-03 | End: 2022-03-17

## 2021-12-03 RX ORDER — DEXTROAMPHETAMINE SACCHARATE, AMPHETAMINE ASPARTATE MONOHYDRATE, DEXTROAMPHETAMINE SULFATE AND AMPHETAMINE SULFATE 7.5; 7.5; 7.5; 7.5 MG/1; MG/1; MG/1; MG/1
30 CAPSULE, EXTENDED RELEASE ORAL DAILY
Qty: 30 CAPSULE | Refills: 0 | Status: SHIPPED | OUTPATIENT
Start: 2022-01-03 | End: 2022-02-02

## 2021-12-03 RX ORDER — DEXTROAMPHETAMINE SACCHARATE, AMPHETAMINE ASPARTATE, DEXTROAMPHETAMINE SULFATE AND AMPHETAMINE SULFATE 2.5; 2.5; 2.5; 2.5 MG/1; MG/1; MG/1; MG/1
10 TABLET ORAL DAILY
Qty: 30 TABLET | Refills: 0 | Status: SHIPPED | OUTPATIENT
Start: 2022-02-03 | End: 2022-03-05

## 2021-12-03 RX ORDER — DEXTROAMPHETAMINE SACCHARATE, AMPHETAMINE ASPARTATE, DEXTROAMPHETAMINE SULFATE AND AMPHETAMINE SULFATE 2.5; 2.5; 2.5; 2.5 MG/1; MG/1; MG/1; MG/1
10 TABLET ORAL DAILY
Qty: 30 TABLET | Refills: 0 | Status: SHIPPED | OUTPATIENT
Start: 2022-01-03 | End: 2022-02-02

## 2021-12-03 ASSESSMENT — ANXIETY QUESTIONNAIRES
GAD7 TOTAL SCORE: 7
3. WORRYING TOO MUCH ABOUT DIFFERENT THINGS: SEVERAL DAYS
4. TROUBLE RELAXING: NEARLY EVERY DAY
7. FEELING AFRAID AS IF SOMETHING AWFUL MIGHT HAPPEN: SEVERAL DAYS
GAD7 TOTAL SCORE: 7
5. BEING SO RESTLESS THAT IT IS HARD TO SIT STILL: NOT AT ALL
2. NOT BEING ABLE TO STOP OR CONTROL WORRYING: SEVERAL DAYS
7. FEELING AFRAID AS IF SOMETHING AWFUL MIGHT HAPPEN: SEVERAL DAYS
GAD7 TOTAL SCORE: 7
6. BECOMING EASILY ANNOYED OR IRRITABLE: NOT AT ALL
1. FEELING NERVOUS, ANXIOUS, OR ON EDGE: SEVERAL DAYS

## 2021-12-03 ASSESSMENT — PATIENT HEALTH QUESTIONNAIRE - PHQ9
SUM OF ALL RESPONSES TO PHQ QUESTIONS 1-9: 15
SUM OF ALL RESPONSES TO PHQ QUESTIONS 1-9: 15
10. IF YOU CHECKED OFF ANY PROBLEMS, HOW DIFFICULT HAVE THESE PROBLEMS MADE IT FOR YOU TO DO YOUR WORK, TAKE CARE OF THINGS AT HOME, OR GET ALONG WITH OTHER PEOPLE: VERY DIFFICULT

## 2021-12-03 NOTE — PROGRESS NOTES
TK is a 31 year old who is being evaluated via a billable video visit.      How would you like to obtain your AVS? MyChart  If the video visit is dropped, the invitation should be resent by: Text to cell phone: [phone]  Will anyone else be joining your video visit? No      Video Start Time: 7:37 AM    Assessment & Plan     Moderate episode of recurrent major depressive disorder (H)  Longstanding but getting worse.  New diagnosis today.  Seeing a therapist.I recommended him not to give up on therapy.  We discussed about medications, side effects and we discussed SSRI may be best option due to his anxiety along with depression.  We discussed suicidal thoughts may get  worse and he has suicide hotline information.  We will start with 10 mg of Celexa and increase it to 10:20 days or so to avoid major side effects.  He will follow-up in a month and he will see one of my partners in my absence to see if it is an appropriate option or if he needs to register further. Zoloft, lexapro discussed.   -We briefly discussed about Wellbutrin but we will hold off on that to avoid worsening of his anxiety.  He also has ADHD and multiple patients with ADHD has underlying anxiety or depression.  At this point we agreed not to adjust his ADHD medication.    Chronic pain of right knee  Seeing the physical therapist outside of [facility] but wishes to explore options with [facility].  - [facility]; Future    Pain in joint, ankle and foot, right  See above.   - [facility]; Future    Attention deficit hyperactivity disorder (ADHD), predominantly inattentive type  pdmp checked. 3 months rx given.  We will adjust dose of Adderall in future if needed due to  new diagnosis of anxiety and depression.  - amphetamine-dextroamphetamine (ADDERALL XR) 30 MG 24 hr capsule; Take 1 capsule (30 mg) by mouth daily  - amphetamine-dextroamphetamine (ADDERALL XR) 30 MG 24 hr capsule; Take 1 capsule (30 mg) by mouth  "daily  - amphetamine-dextroamphetamine (ADDERALL XR) 30 MG 24 hr capsule; Take 1 capsule (30 mg) by mouth daily  - amphetamine-dextroamphetamine (ADDERALL) 10 MG tablet; Take 1 tablet (10 mg) by mouth daily Around 12 pm  - amphetamine-dextroamphetamine (ADDERALL) 10 MG tablet; Take 1 tablet (10 mg) by mouth daily Around 12 pm  - amphetamine-dextroamphetamine (ADDERALL) 10 MG tablet; Take 1 tablet (10 mg) by mouth daily At 12 pm      BMI:   Estimated body mass index is 27.71 kg/m  as calculated from the following:    Height as of 10/25/21: 1.734 m (5' 8.25\").    Weight as of 10/25/21: 83.3 kg (183 lb 9.6 oz).       Depression Screening Follow Up    PHQ 12/3/2021   PHQ-9 Total Score 15   Q9: Thoughts of better off dead/self-harm past 2 weeks More than half the days   F/U: Thoughts of suicide or self-harm Yes   F/U: Self harm-plan No   F/U: Self-harm action No   F/U: Safety concerns No               Return in about 4 weeks (around 12/31/2021).    Vish Michael MD, MD  [facility]   TK is a 31 year old who presents for the following health issues     HPI       Answers for HPI/ROS submitted by the patient on 12/3/2021  If you checked off any problems, how difficult have these problems made it for you to do your work, take care of things at home, or get along with other people?: Very difficult  PHQ9 TOTAL SCORE: 15  HARI 7 TOTAL SCORE: 7  Depression/Anxiety: Depression & Anxiety  Status since last visit:: bad  Anxiety since last: : bad  Other associated symptoms of depression:: No  Other associated symotome: : No  Significant life event: : No  Anxious:: No  Current substance use:: No  How many servings of fruits and vegetables do you eat daily?: 2-3  On average, how many sweetened beverages do you drink each day (Examples: soda, juice, sweet tea, etc.  Do NOT count diet or artificially sweetened beverages)?: 0  How many minutes a day do you exercise enough to make your " "heart beat faster?: 60 or more  How many days a week do you exercise enough to make your heart beat faster?: 5  How many days per week do you miss taking your medication?: 0    Seeing psychologist for 3 yrs or so. Dr Goldberg therapist.  Tried to solve for long time but it did not go well.   Wants to try antidepressant.   Started in college. Had to move to new place and at that time was feeling sad and anxious.   High school was OK.   Sister, mother - anxiety and depression. Taking depression medication.   Grandfather commited suicide and when father is frustrated he says he would kill himself.   Suicidal thoughts present - no plan or intention. Feeling safe.   No episode of julieth.   Alcohol - casually. Last time was a month ago.   Smoking - none.  Marijuana - former user. No other street use.   Thinks about what he is going to do with his life and thinks about how things are pointless in life.   Living - 4 roommates.       Review of Systems         Objective    Vitals - Patient Reported  Weight (Patient Reported): 79.4 kg (175 lb)  Height (Patient Reported): 167.6 cm (5' 6\")  BMI (Based on Pt Reported Ht/Wt): 28.25        Physical Exam   GENERAL: Healthy, alert and no distress  EYES: Eyes grossly normal to inspection.  No discharge or erythema, or obvious scleral/conjunctival abnormalities.  RESP: No audible wheeze, cough, or visible cyanosis.  No visible retractions or increased work of breathing.    SKIN: Visible skin clear. No significant rash, abnormal pigmentation or lesions.  NEURO: Cranial nerves grossly intact.  Mentation and speech appropriate for age.  PSYCH: Mentation appears normal, affect normal/bright, judgement and insight intact, normal speech and appearance well-groomed.                Video-Visit Details    Type of service:  Video Visit    Video End Time:8:07 AM    Originating Location (pt. Location): Home    Distant Location (provider location):  [facility]"     Platform used for Video Visit: Derik

## 2021-12-03 NOTE — PATIENT INSTRUCTIONS
Call one number to schedule at any of the above locations: [phone].    Start with celexa (citalopram) 1/2 pill for 10 days and then full pill every morning.     Follow up in a month.

## 2021-12-03 NOTE — TELEPHONE ENCOUNTER
[facility] pharmacy calling for new script to be sent over. Directions on script states:Take 0.5 tablets (10 mg) by mouth every morning for 10 days, THEN 0.5 tablets (10 mg) every morning. - Oral and per visit notes it says: Start with celexa (citalopram) 1/2 pill for 10 days and then full pill every morning. New script sent into pharmacy.    Telma Mcdonald RN

## 2021-12-04 ASSESSMENT — ANXIETY QUESTIONNAIRES: GAD7 TOTAL SCORE: 7

## 2021-12-04 ASSESSMENT — PATIENT HEALTH QUESTIONNAIRE - PHQ9: SUM OF ALL RESPONSES TO PHQ QUESTIONS 1-9: 15

## 2021-12-14 NOTE — PROGRESS NOTES
Colon and Rectal Surgery Clinic Note    RE: Cinda Peterson.  : 1990.  NEISHA: 12/15/2021.    Reason for visit: Recurrent perianal abscess    HPI: 32 yo M presenting with concerns for recurrent perianal abscess. He was seen by my colleague, Agata Pichardo, who performed an I&D in clinic in September, and subsequently did well. He now presents with complaints of anal pain, feels better now. However, he occasionally has anal pain associated with purulent drainage. This pain has been on and off since September now. In October on exam, a small scar was present in the left anterior position. This was slightly firm with a firm tract extending from this to the right.      Medical history:  Past Medical History:   Diagnosis Date     Adult ADHD      H/O vitamin D deficiency        Surgical history:  None    Family history:  Family History   Problem Relation Age of Onset     No Known Problems Mother      No Known Problems Father      No Known Problems Maternal Grandmother      No Hx of IBD or GI malignancy    Medications:  Current Outpatient Medications   Medication Sig Dispense Refill     albuterol (VENTOLIN HFA) 108 (90 Base) MCG/ACT inhaler Inhale 2 puffs into the lungs 4 times daily 18 g 3     amphetamine-dextroamphetamine (ADDERALL XR) 30 MG 24 hr capsule Take 1 capsule (30 mg) by mouth daily 30 capsule 0     [START ON 1/3/2022] amphetamine-dextroamphetamine (ADDERALL XR) 30 MG 24 hr capsule Take 1 capsule (30 mg) by mouth daily 30 capsule 0     [START ON 2/3/2022] amphetamine-dextroamphetamine (ADDERALL XR) 30 MG 24 hr capsule Take 1 capsule (30 mg) by mouth daily 30 capsule 0     amphetamine-dextroamphetamine (ADDERALL) 10 MG tablet Take 1 tablet (10 mg) by mouth daily Around 12 pm 30 tablet 0     [START ON 1/3/2022] amphetamine-dextroamphetamine (ADDERALL) 10 MG tablet Take 1 tablet (10 mg) by mouth daily Around 12 pm 30 tablet 0     [START ON 2/3/2022] amphetamine-dextroamphetamine  "(ADDERALL) 10 MG tablet Take 1 tablet (10 mg) by mouth daily At 12 pm 30 tablet 0     cholecalciferol (VITAMIN D3) 1000 UNIT tablet Take 1,000 Units by mouth daily       citalopram (CELEXA) 20 MG tablet Take 0.5 tablets (10 mg) by mouth every morning for 10 days, THEN 1 tablet (20 mg) every morning. 35 tablet 0     fluticasone (FLOVENT HFA) 44 MCG/ACT inhaler Inhale 1 puff into the lungs 2 times daily 30 g 0     hydrocortisone-pramoxine (PROCTOFOAM-HC) rectal foam Place 1 Applicatorful rectally 3 times daily 10 g 0     Omega-3 Fatty Acids (OMEGA-3 PLUS) 1000 MG CAPS Take 1,000 mg by mouth daily         Allergies:  No Known Allergies    Social history:   Social History     Tobacco Use     Smoking status: Never Smoker     Smokeless tobacco: Never Used   Substance Use Topics     Alcohol use: No     Alcohol/week: 0.0 standard drinks     Comment: quit drinking     Marital status: single.    ROS:  A complete review of systems was performed with the patient and all systems negative except as per HPI.    Physical Examination:  Exam was chaperoned by Winnie Molina CMA  /70 (BP Location: Left arm, Patient Position: Sitting, Cuff Size: Adult Regular)   Pulse 86   Ht 1.734 m (5' 8.25\")   Wt 82.5 kg (181 lb 14.4 oz)   SpO2 99%   BMI 27.46 kg/m    General: Well hydrated. No acute distress.  Abdomen: Soft, NT, ND..  Perianal external examination:  Perianal skin: Intact with no excoriation or lichenification.  Lesions: left anterior to anal verge there was a scar identified, with palpable cord like structure towards anus.   Eversion of buttocks: There was not evidence of an anal fissure. Details: N/A.  Skin tags or external hemorrhoids: None.  Digital rectal examination: Was performed.   Sphincter tone: Good.  Palpable lesions: No.  Other: None.  Bimanual examination: was not performed.    Anoscopy: Was performed.   Hemorrhoids: No significant internal hemorrhoids.  Lesions: Yes: Left anterior a defect that may be an " internal opening was visualized in distal anal canal.      ASSESSMENT  1. Recurrent perianal abscess, possibly associated with perianal fistula    PLAN  1. When he is ready, plan for EUA with seton placement vs fistulotomy. He will call and schedule when ready.    Risks, benefits, and alternatives of operative treatment were thoroughly discussed with the patient, he understands these well and agrees to proceed.    Time spent: 30 minutes.  >50% spent in discussing, counseling and coordinating care.    Rodrigo Rodriguez M.D    Division of Colon and Rectal Surgery  [facility]    Referring Provider:  No referring provider defined for this encounter.     Primary Care Provider:  Vish Michael

## 2021-12-14 NOTE — TELEPHONE ENCOUNTER
RECORDS RECEIVED FROM: Possible Recurrent Abscess   Appt date: 12/15/2021   NOTES STATUS DETAILS   OFFICE NOTE from referring provider  N/A    OFFICE NOTE from other specialist   Internal / [facility]:  10/25/21, 9/28/21 - Agata Naidu NP    [facility]:  9/24/21 - UC OV with EMANUEL Sommers    [facility]:  12/11/17 - PCC OV with Dr. Roy   DISCHARGE SUMMARY from hospital  N/A    DISCHARGE REPORT from the ER N/A    OPERATIVE REPORT  N/A    MEDICATION LIST Internal    LABS     PFC TESTING N/A    ANAL PAP N/A    BIOPSIES/PATHOLOGY RELATED TO DIAGNOSIS N/A    DIAGNOSTIC PROCEDURES     COLONOSCOPY N/A    UPPER ENDOSCOPY (EGD) N/A    FLEX SIGMOIDOSCOPY  N/A    ERCP N/A    IMAGING (DISC & REPORT)      CT  N/A    MRI N/A    XRAY N/A    ULTRASOUND (ENDOANAL/ENDORECTAL) N/A

## 2021-12-15 ENCOUNTER — PRE VISIT (OUTPATIENT)
Dept: SURGERY | Facility: CLINIC | Age: 31
End: 2021-12-15
Payer: COMMERCIAL

## 2021-12-15 ENCOUNTER — OFFICE VISIT (OUTPATIENT)
Dept: SURGERY | Facility: CLINIC | Age: 31
End: 2021-12-15
Payer: COMMERCIAL

## 2021-12-15 VITALS
BODY MASS INDEX: 27.57 KG/M2 | SYSTOLIC BLOOD PRESSURE: 127 MMHG | HEIGHT: 68 IN | WEIGHT: 181.9 LBS | DIASTOLIC BLOOD PRESSURE: 70 MMHG | HEART RATE: 86 BPM | OXYGEN SATURATION: 99 %

## 2021-12-15 DIAGNOSIS — K61.0 PERIANAL ABSCESS: Primary | ICD-10-CM

## 2021-12-15 PROCEDURE — 99214 OFFICE O/P EST MOD 30 MIN: CPT | Performed by: SURGERY

## 2021-12-15 ASSESSMENT — ENCOUNTER SYMPTOMS
DECREASED CONCENTRATION: 0
NERVOUS/ANXIOUS: 1
INSOMNIA: 1
DEPRESSION: 1
PANIC: 0

## 2021-12-15 ASSESSMENT — MIFFLIN-ST. JEOR: SCORE: 1763.56

## 2021-12-15 ASSESSMENT — PAIN SCALES - GENERAL: PAINLEVEL: NO PAIN (0)

## 2021-12-15 NOTE — LETTER
12/15/2021       RE: Cidna Peterson  [address] [facility] [address]     Dear Colleague,    Thank you for referring your patient, Cinda Peterson, to the [facility] COLON [facility] RE[facility]GERY [facility]IN[facility] [place] at [facility]. Please see a copy of my visit note below.    Colon and Rectal Surgery Clinic Note    RE: Cinda Peterson.  : 1990.  NEISHA: 12/15/2021.    Reason for visit: Recurrent perianal abscess    HPI: 32 yo M presenting with concerns for recurrent perianal abscess. He was seen by my colleague, Agata Pichardo, who performed an I&D in clinic in September, and subsequently did well. He now presents with complaints of anal pain, feels better now. However, he occasionally has anal pain associated with purulent drainage. This pain has been on and off since September now. In October on exam, a small scar was present in the left anterior position. This was slightly firm with a firm tract extending from this to the right.      Medical history:  Past Medical History:   Diagnosis Date     Adult ADHD      H/O vitamin D deficiency        Surgical history:  None    Family history:  Family History   Problem Relation Age of Onset     No Known Problems Mother      No Known Problems Father      No Known Problems Maternal Grandmother      No Hx of IBD or GI malignancy    Medications:  Current Outpatient Medications   Medication Sig Dispense Refill     albuterol (VENTOLIN HFA) 108 (90 Base) MCG/ACT inhaler Inhale 2 puffs into the lungs 4 times daily 18 g 3     amphetamine-dextroamphetamine (ADDERALL XR) 30 MG 24 hr capsule Take 1 capsule (30 mg) by mouth daily 30 capsule 0     [START ON 1/3/2022] amphetamine-dextroamphetamine (ADDERALL XR) 30 MG 24 hr capsule Take 1 capsule (30 mg) by mouth daily 30 capsule 0     [START ON 2/3/2022] amphetamine-dextroamphetamine (ADDERALL XR) 30 MG 24 hr capsule Take 1 capsule (30  "mg) by mouth daily 30 capsule 0     amphetamine-dextroamphetamine (ADDERALL) 10 MG tablet Take 1 tablet (10 mg) by mouth daily Around 12 pm 30 tablet 0     [START ON 1/3/2022] amphetamine-dextroamphetamine (ADDERALL) 10 MG tablet Take 1 tablet (10 mg) by mouth daily Around 12 pm 30 tablet 0     [START ON 2/3/2022] amphetamine-dextroamphetamine (ADDERALL) 10 MG tablet Take 1 tablet (10 mg) by mouth daily At 12 pm 30 tablet 0     cholecalciferol (VITAMIN D3) 1000 UNIT tablet Take 1,000 Units by mouth daily       citalopram (CELEXA) 20 MG tablet Take 0.5 tablets (10 mg) by mouth every morning for 10 days, THEN 1 tablet (20 mg) every morning. 35 tablet 0     fluticasone (FLOVENT HFA) 44 MCG/ACT inhaler Inhale 1 puff into the lungs 2 times daily 30 g 0     hydrocortisone-pramoxine (PROCTOFOAM-HC) rectal foam Place 1 Applicatorful rectally 3 times daily 10 g 0     Omega-3 Fatty Acids (OMEGA-3 PLUS) 1000 MG CAPS Take 1,000 mg by mouth daily         Allergies:  No Known Allergies    Social history:   Social History     Tobacco Use     Smoking status: Never Smoker     Smokeless tobacco: Never Used   Substance Use Topics     Alcohol use: No     Alcohol/week: 0.0 standard drinks     Comment: quit drinking     Marital status: single.    ROS:  A complete review of systems was performed with the patient and all systems negative except as per HPI.    Physical Examination:  Exam was chaperoned by Winnie Molina CMA  /70 (BP Location: Left arm, Patient Position: Sitting, Cuff Size: Adult Regular)   Pulse 86   Ht 1.734 m (5' 8.25\")   Wt 82.5 kg (181 lb 14.4 oz)   SpO2 99%   BMI 27.46 kg/m    General: Well hydrated. No acute distress.  Abdomen: Soft, NT, ND..  Perianal external examination:  Perianal skin: Intact with no excoriation or lichenification.  Lesions: left anterior to anal verge there was a scar identified, with palpable cord like structure towards anus.   Eversion of buttocks: There was not evidence of an anal " fissure. Details: N/A.  Skin tags or external hemorrhoids: None.  Digital rectal examination: Was performed.   Sphincter tone: Good.  Palpable lesions: No.  Other: None.  Bimanual examination: was not performed.    Anoscopy: Was performed.   Hemorrhoids: No significant internal hemorrhoids.  Lesions: Yes: Left anterior a defect that may be an internal opening was visualized in distal anal canal.      ASSESSMENT  1. Recurrent perianal abscess, possibly associated with perianal fistula    PLAN  1. When he is ready, plan for EUA with seton placement vs fistulotomy. He will call and schedule when ready.    Risks, benefits, and alternatives of operative treatment were thoroughly discussed with the patient, he understands these well and agrees to proceed.    Time spent: 30 minutes.  >50% spent in discussing, counseling and coordinating care.    Rodrigo Rodriguez M.D    Division of Colon and Rectal Surgery  [facility]    Referring Provider:  No referring provider defined for this encounter.     Primary Care Provider:  Vish Michael      Again, thank you for allowing me to participate in the care of your patient.      Sincerely,    Rodrigo Rodriguez MD, MD

## 2021-12-15 NOTE — NURSING NOTE
"Chief Complaint   Patient presents with     New Patient     possible abscess       Vitals:    12/15/21 1448   BP: 127/70   BP Location: Left arm   Patient Position: Sitting   Cuff Size: Adult Regular   Pulse: 86   SpO2: 99%   Weight: 181 lb 14.4 oz   Height: 5' 8.25\"       Body mass index is 27.46 kg/m .    Winnie Molina CMA    "

## 2021-12-15 NOTE — PATIENT INSTRUCTIONS
Follow up:  1. Please call Sharon to schedule your surgery  Appointments you will need:   -pre op covid test, physical and 2 fleet enemas the day of surgery.      Please call with any questions or concerns regarding your clinic visit today.    It is a pleasure being involved in your health care.    Contacts post-consultation depending on your need:    Radiology Appointments [phone]    Schedule Clinic Appointments [phone] # 1   M-F 7:30 - 5 pm    MARGARITA Chaudhary [phone]    Clinic Fax Number [phone]    Surgery Scheduling [phone]    My Chart is available 24 hours a day and is a secure way to access your records and communicate with your care team.  I strongly recommend signing up if you haven't already done so, if you are comfortable with computers.  If you would like to inquire about this or are having problems with My Chart access, you may call [phone] or go online at [email or web address].  Please allow at least 24 hours for a response and extra time on weekends and Holidays.

## 2021-12-28 ENCOUNTER — TELEPHONE (OUTPATIENT)
Dept: SURGERY | Facility: CLINIC | Age: 31
End: 2021-12-28
Payer: COMMERCIAL

## 2021-12-28 NOTE — TELEPHONE ENCOUNTER
Tier 2 Case Request received to schedule:    Patient Name: Cinda Peterson   MRN: [id number]   Case#: [id number]   Surgeon(s) and Role:      * Rodrigo Rodriguez MD - Primary   Date requested: * No dates entered *   Location: [facility] OR   Procedure(s):   EXAM UNDER ANESTHESIA, ANUS, POSSIBLE SETON STITCH PLACEMENT, POSSIBLE FISTUOLOTOMY     Additional Instructions for the Case  Multi-surgeon case:  NO  Time in minutes:  45  Anesthesia: MAC  Prep: 2 FLEETS  Pre-Op: PAC  Labs: NO  WOC: NO  Special equipment: NO  Special Instructions: NO    Schedule with Agata Pichardo NP 2-3 weeks after surgery.  Schedule with Surgeon 3-4 weeks after Agata Pichardo NP    On 12/28/2021:  Left [facility] for patient to call back regarding scheduling.  Sent the following Recombine message to patient:    Bharat BLISS,    My name is Sharon, and I am one of the schedulers for Dr. Rodrigo Rodriguez. I hope you are well!     I was wondering if you had a specific time-frame in mind when you would like to schedule your surgery with Dr. Rodrigo Rodriguez?     Please let me know and we can work on scheduling a date.      You may either reply to this message or call me directly at [phone].      Sharon Pettit  Chanel-op Coordinator  [facility]  Direct Phone: [phone]

## 2021-12-29 ENCOUNTER — MYC MEDICAL ADVICE (OUTPATIENT)
Dept: FAMILY MEDICINE | Facility: CLINIC | Age: 31
End: 2021-12-29
Payer: COMMERCIAL

## 2021-12-29 DIAGNOSIS — F33.1 MODERATE EPISODE OF RECURRENT MAJOR DEPRESSIVE DISORDER (H): ICD-10-CM

## 2021-12-29 RX ORDER — CITALOPRAM HYDROBROMIDE 20 MG/1
20 TABLET ORAL DAILY
Qty: 30 TABLET | Refills: 0 | Status: SHIPPED | OUTPATIENT
Start: 2021-12-29 | End: 2022-01-03

## 2022-01-03 ENCOUNTER — VIRTUAL VISIT (OUTPATIENT)
Dept: FAMILY MEDICINE | Facility: CLINIC | Age: 32
End: 2022-01-03
Payer: COMMERCIAL

## 2022-01-03 DIAGNOSIS — F33.1 MODERATE EPISODE OF RECURRENT MAJOR DEPRESSIVE DISORDER (H): ICD-10-CM

## 2022-01-03 PROCEDURE — 99213 OFFICE O/P EST LOW 20 MIN: CPT | Mod: 95 | Performed by: FAMILY MEDICINE

## 2022-01-03 PROCEDURE — 96127 BRIEF EMOTIONAL/BEHAV ASSMT: CPT | Mod: 95 | Performed by: FAMILY MEDICINE

## 2022-01-03 RX ORDER — CITALOPRAM HYDROBROMIDE 20 MG/1
20 TABLET ORAL DAILY
Qty: 90 TABLET | Refills: 3 | Status: SHIPPED | OUTPATIENT
Start: 2022-01-03 | End: 2022-05-31

## 2022-01-03 ASSESSMENT — PATIENT HEALTH QUESTIONNAIRE - PHQ9
5. POOR APPETITE OR OVEREATING: NOT AT ALL
SUM OF ALL RESPONSES TO PHQ QUESTIONS 1-9: 0

## 2022-01-03 ASSESSMENT — ANXIETY QUESTIONNAIRES
IF YOU CHECKED OFF ANY PROBLEMS ON THIS QUESTIONNAIRE, HOW DIFFICULT HAVE THESE PROBLEMS MADE IT FOR YOU TO DO YOUR WORK, TAKE CARE OF THINGS AT HOME, OR GET ALONG WITH OTHER PEOPLE: NOT DIFFICULT AT ALL
6. BECOMING EASILY ANNOYED OR IRRITABLE: NOT AT ALL
GAD7 TOTAL SCORE: 0
3. WORRYING TOO MUCH ABOUT DIFFERENT THINGS: NOT AT ALL
5. BEING SO RESTLESS THAT IT IS HARD TO SIT STILL: NOT AT ALL
1. FEELING NERVOUS, ANXIOUS, OR ON EDGE: NOT AT ALL
7. FEELING AFRAID AS IF SOMETHING AWFUL MIGHT HAPPEN: NOT AT ALL
2. NOT BEING ABLE TO STOP OR CONTROL WORRYING: NOT AT ALL

## 2022-01-03 NOTE — PROGRESS NOTES
TK is a 31 year old who is being evaluated via a billable telephone visit.      What phone number would you like to be contacted at? [phone]  How would you like to obtain your AVS? MyChart    Assessment & Plan     Moderate episode of recurrent major depressive disorder (H)  -Significantly improved on citalopram, continue medication  -Follow-up with PCP if any changes  - citalopram (CELEXA) 20 MG tablet  Dispense: 90 tablet; Refill: 3    Fredy Belrtan DO  [facility]    Subjective   TK is a 31 year old who presents for the following health issues     HPI     Patient here for follow-up on depression. Was started on citalopram about 4 weeks ago. Feels like his mood had significantly improved since starting medication. No longer as anxious which helps with his mood. Would like to continue on same dosage of medication.     Depression and Anxiety Follow-Up    How are you doing with your depression since your last visit? Improved     How are you doing with your anxiety since your last visit?  Improved     Are you having other symptoms that might be associated with depression or anxiety? No    Have you had a significant life event? No     Do you have any concerns with your use of alcohol or other drugs? No    Social History     Tobacco Use     Smoking status: Never Smoker     Smokeless tobacco: Never Used   Vaping Use     Vaping Use: Never used   Substance Use Topics     Alcohol use: No     Alcohol/week: 0.0 standard drinks     Comment: quit drinking     Drug use: No     PHQ 8/24/2021 12/3/2021 1/3/2022   PHQ-9 Total Score 0 15 0   Q9: Thoughts of better off dead/self-harm past 2 weeks Not at all More than half the days Not at all   F/U: Thoughts of suicide or self-harm - Yes -   F/U: Self harm-plan - No -   F/U: Self-harm action - No -   F/U: Safety concerns - No -     HARI-7 SCORE 8/24/2021 12/3/2021 1/3/2022   Total Score - 7 (mild anxiety) -   Total Score 0 7 0     Last PHQ-9 1/3/2022   1.   "Little interest or pleasure in doing things 0   2.  Feeling down, depressed, or hopeless 0   3.  Trouble falling or staying asleep, or sleeping too much 0   4.  Feeling tired or having little energy 0   5.  Poor appetite or overeating 0   6.  Feeling bad about yourself 0   7.  Trouble concentrating 0   8.  Moving slowly or restless 0   Q9: Thoughts of better off dead/self-harm past 2 weeks 0   PHQ-9 Total Score 0   Difficulty at work, home, or with people Not difficult at all   In the past two weeks have you had thoughts of suicide or self harm? -   Do you have concerns about your personal safety or the safety of others? -   In the past 2 weeks have you thought about a plan or had intention to harm yourself? -   In the past 2 weeks have you acted on these thoughts in any way? -     HARI-7  1/3/2022   1. Feeling nervous, anxious, or on edge 0   2. Not being able to stop or control worrying 0   3. Worrying too much about different things 0   4. Trouble relaxing 0   5. Being so restless that it is hard to sit still 0   6. Becoming easily annoyed or irritable 0   7. Feeling afraid, as if something awful might happen 0   HARI-7 Total Score 0   If you checked any problems, how difficult have they made it for you to do your work, take care of things at home, or get along with other people? Not difficult at all         Review of Systems         Objective    Vitals - Patient Reported  Weight (Patient Reported): 79.4 kg (175 lb)  Height (Patient Reported): 167.6 cm (5' 6\")  BMI (Based on Pt Reported Ht/Wt): 28.25        Physical Exam   healthy, alert and no distress  PSYCH: Alert and oriented times 3; coherent speech, normal   rate and volume, able to articulate logical thoughts, able   to abstract reason, no tangential thoughts, no hallucinations   or delusions  His affect is normal  RESP: No cough, no audible wheezing, able to talk in full sentences  Remainder of exam unable to be completed due to telephone visits      "     Phone call duration: 5 minutes

## 2022-01-04 ASSESSMENT — ANXIETY QUESTIONNAIRES: GAD7 TOTAL SCORE: 0

## 2022-01-22 ENCOUNTER — HEALTH MAINTENANCE LETTER (OUTPATIENT)
Age: 32
End: 2022-01-22

## 2022-02-14 NOTE — TELEPHONE ENCOUNTER
Last attempt at reaching out to schedule patient's outpatient procedure with Dr. Rodrigo Rodriguez. No response to phone call and voicemail message left requesting return call regarding scheduling. Patient did not respond to [employer] message sent to patient requesting reply or return call regarding surgery scheduling with Dr. Rodriguez.     Phone call to patient at provided number, left vm msg for return call regarding scheduling.     Sent the following [employer] message to patient requesting that he reply or call regarding scheduling his outpatient surgery with Dr. Rodrigo Rodriguez:    Bharat BLISS,    My name is Sharon, and I am one of the schedulers for Dr. Rodrigo Rodriguez. I hope you are well!    I have been trying to reach you by phone and by [employer] in order to discuss scheduling your outpatient surgery with Dr. Rodrigo Rodriguez, but so far, I have not been able to reach you.     I was wondering if you had a specific time-frame in mind when you would like to schedule your surgery with Dr. Rodrigo Rodriguez?     Please let me know and we can work on scheduling a date.      You may either reply to this message or call me directly at [phone].      Wilver,    Sharon Sanford  Chanel-op Coordinator  [facility]  Direct Phone: [phone]   -------------------------------------    If patient responds, then will assist with surgery scheduling. For now, ceasing contact attempts.

## 2022-03-16 ENCOUNTER — MYC MEDICAL ADVICE (OUTPATIENT)
Dept: FAMILY MEDICINE | Facility: CLINIC | Age: 32
End: 2022-03-16
Payer: COMMERCIAL

## 2022-03-17 ENCOUNTER — VIRTUAL VISIT (OUTPATIENT)
Dept: FAMILY MEDICINE | Facility: CLINIC | Age: 32
End: 2022-03-17
Payer: COMMERCIAL

## 2022-03-17 DIAGNOSIS — J45.20 MILD INTERMITTENT ASTHMA WITHOUT COMPLICATION: Primary | ICD-10-CM

## 2022-03-17 DIAGNOSIS — F90.0 ATTENTION DEFICIT HYPERACTIVITY DISORDER (ADHD), PREDOMINANTLY INATTENTIVE TYPE: ICD-10-CM

## 2022-03-17 DIAGNOSIS — F41.1 GENERALIZED ANXIETY DISORDER: ICD-10-CM

## 2022-03-17 PROBLEM — M54.2 NECK PAIN: Status: RESOLVED | Noted: 2020-09-15 | Resolved: 2022-03-17

## 2022-03-17 PROBLEM — K60.50 ANORECTAL FISTULA: Status: ACTIVE | Noted: 2022-01-09

## 2022-03-17 PROBLEM — M77.00 MEDIAL EPICONDYLITIS OF ELBOW: Status: ACTIVE | Noted: 2020-02-12

## 2022-03-17 PROBLEM — M25.512 SHOULDER PAIN, LEFT: Status: RESOLVED | Noted: 2020-09-15 | Resolved: 2022-03-17

## 2022-03-17 PROBLEM — M25.511 SHOULDER PAIN, RIGHT: Status: RESOLVED | Noted: 2020-09-15 | Resolved: 2022-03-17

## 2022-03-17 PROCEDURE — 99213 OFFICE O/P EST LOW 20 MIN: CPT | Mod: 95 | Performed by: FAMILY MEDICINE

## 2022-03-17 RX ORDER — DEXTROAMPHETAMINE SACCHARATE, AMPHETAMINE ASPARTATE MONOHYDRATE, DEXTROAMPHETAMINE SULFATE AND AMPHETAMINE SULFATE 7.5; 7.5; 7.5; 7.5 MG/1; MG/1; MG/1; MG/1
30 CAPSULE, EXTENDED RELEASE ORAL DAILY
Qty: 30 CAPSULE | Refills: 0 | Status: SHIPPED | OUTPATIENT
Start: 2022-03-23 | End: 2022-04-29

## 2022-03-17 RX ORDER — DEXTROAMPHETAMINE SACCHARATE, AMPHETAMINE ASPARTATE, DEXTROAMPHETAMINE SULFATE AND AMPHETAMINE SULFATE 2.5; 2.5; 2.5; 2.5 MG/1; MG/1; MG/1; MG/1
10 TABLET ORAL DAILY
Qty: 30 TABLET | Refills: 0 | Status: SHIPPED | OUTPATIENT
Start: 2022-03-23 | End: 2022-04-29

## 2022-03-17 RX ORDER — FLUTICASONE PROPIONATE 44 UG/1
1 AEROSOL, METERED RESPIRATORY (INHALATION) 2 TIMES DAILY
Qty: 30 G | Refills: 0 | Status: SHIPPED | OUTPATIENT
Start: 2022-03-17

## 2022-03-17 RX ORDER — ALBUTEROL SULFATE 90 UG/1
2 AEROSOL, METERED RESPIRATORY (INHALATION) 4 TIMES DAILY
Qty: 18 G | Refills: 3 | Status: SHIPPED | OUTPATIENT
Start: 2022-03-23

## 2022-03-17 ASSESSMENT — ASTHMA QUESTIONNAIRES
QUESTION_4 LAST FOUR WEEKS HOW OFTEN HAVE YOU USED YOUR RESCUE INHALER OR NEBULIZER MEDICATION (SUCH AS ALBUTEROL): THREE OR MORE TIMES PER DAY
QUESTION_2 LAST FOUR WEEKS HOW OFTEN HAVE YOU HAD SHORTNESS OF BREATH: ONCE OR TWICE A WEEK
QUESTION_5 LAST FOUR WEEKS HOW WOULD YOU RATE YOUR ASTHMA CONTROL: WELL CONTROLLED
ACT_TOTALSCORE: 19
QUESTION_3 LAST FOUR WEEKS HOW OFTEN DID YOUR ASTHMA SYMPTOMS (WHEEZING, COUGHING, SHORTNESS OF BREATH, CHEST TIGHTNESS OR PAIN) WAKE YOU UP AT NIGHT OR EARLIER THAN USUAL IN THE MORNING: NOT AT ALL
ACT_TOTALSCORE: 19
QUESTION_1 LAST FOUR WEEKS HOW MUCH OF THE TIME DID YOUR ASTHMA KEEP YOU FROM GETTING AS MUCH DONE AT WORK, SCHOOL OR AT HOME: NONE OF THE TIME

## 2022-03-17 ASSESSMENT — PATIENT HEALTH QUESTIONNAIRE - PHQ9
10. IF YOU CHECKED OFF ANY PROBLEMS, HOW DIFFICULT HAVE THESE PROBLEMS MADE IT FOR YOU TO DO YOUR WORK, TAKE CARE OF THINGS AT HOME, OR GET ALONG WITH OTHER PEOPLE: SOMEWHAT DIFFICULT
SUM OF ALL RESPONSES TO PHQ QUESTIONS 1-9: 2
SUM OF ALL RESPONSES TO PHQ QUESTIONS 1-9: 2

## 2022-03-17 NOTE — PROGRESS NOTES
TK is a 31 year old who is being evaluated via a billable video visit.      How would you like to obtain your AVS? MyChart  If the video visit is dropped, the invitation should be resent by: Text to cell phone: [phone]  Will anyone else be joining your video visit? No  Video Start Time: 5:35 PM    Assessment & Plan   (J45.20) Mild intermittent asthma without complication  Comment: at goal  Encouraged bid use fluticasone if finding he requires more frequent use of albuterol    Plan: albuterol (VENTOLIN HFA) 108 (90 Base) MCG/ACT         inhaler, fluticasone (FLOVENT HFA) 44 MCG/ACT         inhaler, Asthma Action Plan (AAP)            (F90.0) Attention deficit hyperactivity disorder (ADHD), predominantly inattentive type  Comment: At goal  The current medical regimen is effective;  continue present plan and medications.    Medications refills, to be picked up 3/23/22, so that he can have them on his trip (leaving for [place] 3/25).    Plan: amphetamine-dextroamphetamine (ADDERALL XR) 30         MG 24 hr capsule, amphetamine-dextroamphetamine        (ADDERALL) 10 MG tablet    (F41.1) Generalized Anxiety Disorder  HARI-7 SCORE 8/24/2021 12/3/2021 1/3/2022   Total Score - 7 (mild anxiety) -   Total Score 0 7 0      He is currently on celexa, isn't sure it's working very well, is considering other options, but will continue for now given imminent travel.          :(F41.1) [id number]}     Return in about 3 months (around 6/17/2022) for preventive visit (wellness/annual physical exam).    Juana Chamorro MD  [facility]    Subjective   TK is a 31 year old who presents for the following health issues   He's leaving for [place] 3/25, but prescriptions aren't due for refills until 3/29/22    History of Present Illness       Mental Health Follow-up:                    Today's PHQ-9         PHQ-9 Total Score: 2  PHQ-9 Q9 Thoughts of better off dead/self-harm past 2 weeks :   (P) Not at all    How  difficult have these problems made it for you to do your work, take care of things at home, or get along with other people: Somewhat difficult        Reason for visit:  Need medication before I leave for [place]    He eats 2-3 servings of fruits and vegetables daily.He consumes 0 sweetened beverage(s) daily.He exercises with enough effort to increase his heart rate 60 or more minutes per day.  He exercises with enough effort to increase his heart rate 4 days per week.   He is taking medications regularly.     Depression Followup    How are you doing with your depression since your last visit? Improved, feels that anxiety is the more prominent symptom    He does see a therapist he describes as a psychoanalyst, it doesn't sound like he's had cognitive behavioral     Are you having other symptoms that might be associated with depression? No    Have you had a significant life event?  No     Are you feeling anxious or having panic attacks?   Yes:  sometimes    Do you have any concerns with your use of alcohol or other drugs? No    Social History     Tobacco Use     Smoking status: Never Smoker     Smokeless tobacco: Never Used   Vaping Use     Vaping Use: Never used   Substance Use Topics     Alcohol use: No     Alcohol/week: 0.0 standard drinks     Comment: quit drinking     Drug use: No     PHQ 12/3/2021 1/3/2022 3/17/2022   PHQ-9 Total Score 15 0 2   Q9: Thoughts of better off dead/self-harm past 2 weeks More than half the days Not at all Not at all   F/U: Thoughts of suicide or self-harm Yes - -   F/U: Self harm-plan No - -   F/U: Self-harm action No - -   F/U: Safety concerns No - -     HARI-7 SCORE 8/24/2021 12/3/2021 1/3/2022   Total Score - 7 (mild anxiety) -   Total Score 0 7 0     Last PHQ-9 3/17/2022   1.  Little interest or pleasure in doing things 0   2.  Feeling down, depressed, or hopeless 0   3.  Trouble falling or staying asleep, or sleeping too much 1   4.  Feeling tired or having little energy 0   5.   Poor appetite or overeating 0   6.  Feeling bad about yourself 1   7.  Trouble concentrating 0   8.  Moving slowly or restless 0   Q9: Thoughts of better off dead/self-harm past 2 weeks 0   PHQ-9 Total Score 2   Difficulty at work, home, or with people -   In the past two weeks have you had thoughts of suicide or self harm? -   Do you have concerns about your personal safety or the safety of others? -   In the past 2 weeks have you thought about a plan or had intention to harm yourself? -   In the past 2 weeks have you acted on these thoughts in any way? -     HARI-7  1/3/2022   1. Feeling nervous, anxious, or on edge 0   2. Not being able to stop or control worrying 0   3. Worrying too much about different things 0   4. Trouble relaxing 0   5. Being so restless that it is hard to sit still 0   6. Becoming easily annoyed or irritable 0   7. Feeling afraid, as if something awful might happen 0   HARI-7 Total Score 0   If you checked any problems, how difficult have they made it for you to do your work, take care of things at home, or get along with other people? Not difficult at all       Suicide Assessment Five-step Evaluation and Treatment (SAFE-T)    Review of Systems   Constitutional, HEENT, cardiovascular, pulmonary, GI, , musculoskeletal, neuro, skin, endocrine and psych systems are negative, except as otherwise noted.      Objective           Vitals:  No vitals were obtained today due to virtual visit.    Physical Exam   GENERAL: Healthy, alert and no distress  EYES: Eyes grossly normal to inspection.  No discharge or erythema, or obvious scleral/conjunctival abnormalities.  RESP: No audible wheeze, cough, or visible cyanosis.  No visible retractions or increased work of breathing.    SKIN: Visible skin clear. No significant rash, abnormal pigmentation or lesions.  NEURO: Cranial nerves grossly intact.  Mentation and speech appropriate for age.  PSYCH: Mentation appears normal, affect normal/bright, judgement  and insight intact, normal speech and appearance well-groomed.          Video-Visit Details    Type of service:  Video Visit    Video End Time:5:49 PM    Originating Location (pt. Location): Home    Distant Location (provider location):  [facility]     Platform used for Video Visit: [employer]

## 2022-03-17 NOTE — LETTER
My Asthma Action Plan    Name: Cinda Peterson   YOB: 1990  Date: 3/17/2022   My doctor: Juana Chamorro MD   My clinic: [facility]        My Rescue Medicine:   Albuterol inhaler (Proair/Ventolin/Proventil HFA)  2-4 puffs EVERY 4 HOURS as needed. Use a spacer if recommended by your provider.   My Asthma Severity:   Intermittent / Exercise Induced  Know your asthma triggers:   Patient is unaware of triggers          GREEN ZONE   Good Control    I feel good    No cough or wheeze    Can work, sleep and play without asthma symptoms       Take your asthma control medicine every day.     1. If exercise triggers your asthma, take your rescue medication    15 minutes before exercise or sports, and    During exercise if you have asthma symptoms  2. Spacer to use with inhaler: If you have a spacer, make sure to use it with your inhaler             YELLOW ZONE Getting Worse  I have ANY of these:    I do not feel good    Cough or wheeze    Chest feels tight    Wake up at night   1. Keep taking your Green Zone medications  2. Start taking your rescue medicine:    every 20 minutes for up to 1 hour. Then every 4 hours for 24-48 hours.  3. If you stay in the Yellow Zone for more than 12-24 hours, contact your doctor.  4. If you do not return to the Green Zone in 12-24 hours or you get worse, start taking your oral steroid medicine if prescribed by your provider.           RED ZONE Medical Alert - Get Help  I have ANY of these:    I feel awful    Medicine is not helping    Breathing getting harder    Trouble walking or talking    Nose opens wide to breathe       1. Take your rescue medicine NOW  2. If your provider has prescribed an oral steroid medicine, start taking it NOW  3. Call your doctor NOW  4. If you are still in the Red Zone after 20 minutes and you have not reached your doctor:    Take your rescue medicine again and    Call 911 or go to the emergency room right  away    See your regular doctor within 2 weeks of an Emergency Room or Urgent Care visit for follow-up treatment.          Annual Reminders:  Meet with Asthma Educator,  Flu Shot in the Fall, consider Pneumonia Vaccination for patients with asthma (aged 19 and older).    Pharmacy:    [id number] - [place], [place] - [address] [id number] - [place], [place] - [address] AT [facility] [address]    Electronically signed by Juana Chamorro MD   Date: 03/17/22                    Asthma Triggers  How To Control Things That Make Your Asthma Worse    Triggers are things that make your asthma worse.  Look at the list below to help you find your triggers and   what you can do about them. You can help prevent asthma flare-ups by staying away from your triggers.      Trigger                                                          What you can do   Cigarette Smoke  Tobacco smoke can make asthma worse. Do not allow smoking in your home, car or around you.  Be sure no one smokes at a child s day care or school.  If you smoke, ask your health care provider for ways to help you quit.  Ask family members to quit too.  Ask your health care provider for a referral to Quit Plan to help you quit smoking, or call 1-735-226-PLAN.     Colds, Flu, Bronchitis  These are common triggers of asthma. Wash your hands often.  Don t touch your eyes, nose or mouth.  Get a flu shot every year.     Dust Mites  These are tiny bugs that live in cloth or carpet. They are too small to see. Wash sheets and blankets in hot water every week.   Encase pillows and mattress in dust mite proof covers.  Avoid having carpet if you can. If you have carpet, vacuum weekly.   Use a dust mask and HEPA vacuum.   Pollen and Outdoor Mold  Some people are allergic to trees, grass, or weed pollen, or molds. Try to keep your windows closed.  Limit time out doors when pollen count is high.   Ask you health care provider  about taking medicine during allergy season.     Animal Dander  Some people are allergic to skin flakes, urine or saliva from pets with fur or feathers. Keep pets with fur or feathers out of your home.    If you can t keep the pet outdoors, then keep the pet out of your bedroom.  Keep the bedroom door closed.  Keep pets off cloth furniture and away from stuffed toys.     Mice, Rats, and Cockroaches  Some people are allergic to the waste from these pests.   Cover food and garbage.  Clean up spills and food crumbs.  Store grease in the refrigerator.   Keep food out of the bedroom.   Indoor Mold  This can be a trigger if your home has high moisture. Fix leaking faucets, pipes, or other sources of water.   Clean moldy surfaces.  Dehumidify basement if it is damp and smelly.   Smoke, Strong Odors, and Sprays  These can reduce air quality. Stay away from strong odors and sprays, such as perfume, powder, hair spray, paints, smoke incense, paint, cleaning products, candles and new carpet.   Exercise or Sports  Some people with asthma have this trigger. Be active!  Ask your doctor about taking medicine before sports or exercise to prevent symptoms.    Warm up for 5-10 minutes before and after sports or exercise.     Other Triggers of Asthma  Cold air:  Cover your nose and mouth with a scarf.  Sometimes laughing or crying can be a trigger.  Some medicines and food can trigger asthma.

## 2022-03-17 NOTE — PATIENT INSTRUCTIONS
"Anxiety is very common but can be quite disabling. It is absolutely treatable!       One important thing to know is that treating anxiety can be very difficult initially because your body is in a vigilant, hyperaware state when you're chronically anxious, so any changes in how your body is feeling can trigger additional anxiety response. This psychological response can be triggered by starting a new medication. Sometimes just knowing how your brain and body might react can help you cope with any side effects you might have from the medication so that you can stay on them long enough to be effective.      The best therapy for anxiety is cognitive behavioral therapy, which is essentially coaching to help you redirect spiraling thoughts. Therapy is the gold standard for treating anxiety, as it has the best, most effective long term results. You can contact our clinic anytime to schedule a virtual appointment with Shaun Hurtado, our behavioralist here at [facility], who can help provide therapy for anxiety and depression.    Check out the book \"Mind over Mood\".     Therapy is the best approach to anxiety!    "

## 2022-03-18 ASSESSMENT — PATIENT HEALTH QUESTIONNAIRE - PHQ9: SUM OF ALL RESPONSES TO PHQ QUESTIONS 1-9: 2

## 2022-03-22 ENCOUNTER — IMMUNIZATION (OUTPATIENT)
Dept: NURSING | Facility: CLINIC | Age: 32
End: 2022-03-22
Payer: COMMERCIAL

## 2022-03-22 PROCEDURE — 91305 COVID-19,PF,PFIZER (12+ YRS): CPT

## 2022-03-22 PROCEDURE — 0054A COVID-19,PF,PFIZER (12+ YRS): CPT

## 2022-04-27 ENCOUNTER — TELEPHONE (OUTPATIENT)
Dept: FAMILY MEDICINE | Facility: CLINIC | Age: 32
End: 2022-04-27
Payer: COMMERCIAL

## 2022-04-27 ASSESSMENT — ASTHMA QUESTIONNAIRES: ACT_TOTALSCORE: 22

## 2022-04-27 NOTE — TELEPHONE ENCOUNTER
Panel Management Review      Patient has the following on his problem list:     Asthma review     ACT Total Scores 4/27/2022   ACT TOTAL SCORE (Goal Greater than or Equal to 20) 22   In the past 12 months, how many times did you visit the emergency room for your asthma without being admitted to the hospital? 0   In the past 12 months, how many times were you hospitalized overnight because of your asthma? 0      1. Is Asthma diagnosis on the Problem List? Yes    2. Is Asthma listed on Health Maintenance? Yes    3. Patient is due for:  ACT      Composite cancer screening  Chart review shows that this patient is due/due soon for the following None  Summary:    Patient is due/failing the following:   ACT    Action needed:   Patient needs to do ACT.    Type of outreach:    Phone, spoke to patient.  put ACT in screening    Questions for provider review:    None                                                                                                                                    Tari Anderson CMA       Chart routed to Care Team .

## 2022-04-29 ENCOUNTER — MYC REFILL (OUTPATIENT)
Dept: FAMILY MEDICINE | Facility: CLINIC | Age: 32
End: 2022-04-29
Payer: COMMERCIAL

## 2022-04-29 DIAGNOSIS — F90.0 ATTENTION DEFICIT HYPERACTIVITY DISORDER (ADHD), PREDOMINANTLY INATTENTIVE TYPE: ICD-10-CM

## 2022-04-29 NOTE — TELEPHONE ENCOUNTER
Routing refill request to provider for review/approval because:  Drug not on the [facility] refill protocol     Last Written Prescription Date:  3/23/22  Last Fill Quantity: 30,  # refills: 0   Last office visit: 3/17/22 virtual with [employer]  Future Office Visit:      GIOVANNI Peter RN  [facility]

## 2022-05-03 RX ORDER — DEXTROAMPHETAMINE SACCHARATE, AMPHETAMINE ASPARTATE, DEXTROAMPHETAMINE SULFATE AND AMPHETAMINE SULFATE 2.5; 2.5; 2.5; 2.5 MG/1; MG/1; MG/1; MG/1
10 TABLET ORAL DAILY
Qty: 30 TABLET | Refills: 0 | Status: SHIPPED | OUTPATIENT
Start: 2022-05-03 | End: 2022-05-31

## 2022-05-03 RX ORDER — DEXTROAMPHETAMINE SACCHARATE, AMPHETAMINE ASPARTATE MONOHYDRATE, DEXTROAMPHETAMINE SULFATE AND AMPHETAMINE SULFATE 7.5; 7.5; 7.5; 7.5 MG/1; MG/1; MG/1; MG/1
30 CAPSULE, EXTENDED RELEASE ORAL DAILY
Qty: 30 CAPSULE | Refills: 0 | Status: SHIPPED | OUTPATIENT
Start: 2022-05-03 | End: 2022-05-31

## 2022-05-29 ENCOUNTER — MYC REFILL (OUTPATIENT)
Dept: FAMILY MEDICINE | Facility: CLINIC | Age: 32
End: 2022-05-29
Payer: COMMERCIAL

## 2022-05-29 DIAGNOSIS — F90.0 ATTENTION DEFICIT HYPERACTIVITY DISORDER (ADHD), PREDOMINANTLY INATTENTIVE TYPE: ICD-10-CM

## 2022-05-29 RX ORDER — DEXTROAMPHETAMINE SACCHARATE, AMPHETAMINE ASPARTATE MONOHYDRATE, DEXTROAMPHETAMINE SULFATE AND AMPHETAMINE SULFATE 7.5; 7.5; 7.5; 7.5 MG/1; MG/1; MG/1; MG/1
30 CAPSULE, EXTENDED RELEASE ORAL DAILY
Qty: 30 CAPSULE | Refills: 0 | Status: CANCELLED | OUTPATIENT
Start: 2022-05-29

## 2022-05-29 RX ORDER — DEXTROAMPHETAMINE SACCHARATE, AMPHETAMINE ASPARTATE, DEXTROAMPHETAMINE SULFATE AND AMPHETAMINE SULFATE 2.5; 2.5; 2.5; 2.5 MG/1; MG/1; MG/1; MG/1
10 TABLET ORAL DAILY
Qty: 30 TABLET | Refills: 0 | Status: CANCELLED | OUTPATIENT
Start: 2022-05-29

## 2022-05-31 ENCOUNTER — VIRTUAL VISIT (OUTPATIENT)
Dept: FAMILY MEDICINE | Facility: CLINIC | Age: 32
End: 2022-05-31
Payer: COMMERCIAL

## 2022-05-31 DIAGNOSIS — F90.0 ATTENTION DEFICIT HYPERACTIVITY DISORDER (ADHD), PREDOMINANTLY INATTENTIVE TYPE: Primary | ICD-10-CM

## 2022-05-31 DIAGNOSIS — F41.1 GENERALIZED ANXIETY DISORDER: ICD-10-CM

## 2022-05-31 DIAGNOSIS — J45.20 MILD INTERMITTENT ASTHMA WITHOUT COMPLICATION: ICD-10-CM

## 2022-05-31 DIAGNOSIS — F33.1 MODERATE EPISODE OF RECURRENT MAJOR DEPRESSIVE DISORDER (H): ICD-10-CM

## 2022-05-31 PROCEDURE — 99214 OFFICE O/P EST MOD 30 MIN: CPT | Mod: 95 | Performed by: FAMILY MEDICINE

## 2022-05-31 RX ORDER — DEXTROAMPHETAMINE SACCHARATE, AMPHETAMINE ASPARTATE MONOHYDRATE, DEXTROAMPHETAMINE SULFATE AND AMPHETAMINE SULFATE 7.5; 7.5; 7.5; 7.5 MG/1; MG/1; MG/1; MG/1
30 CAPSULE, EXTENDED RELEASE ORAL DAILY
Qty: 90 CAPSULE | Refills: 0 | Status: SHIPPED | OUTPATIENT
Start: 2022-06-06 | End: 2022-06-06

## 2022-05-31 RX ORDER — CITALOPRAM HYDROBROMIDE 20 MG/1
20 TABLET ORAL DAILY
Qty: 90 TABLET | Refills: 0 | Status: SHIPPED | OUTPATIENT
Start: 2022-06-06 | End: 2022-09-04

## 2022-05-31 RX ORDER — DEXTROAMPHETAMINE SACCHARATE, AMPHETAMINE ASPARTATE, DEXTROAMPHETAMINE SULFATE AND AMPHETAMINE SULFATE 2.5; 2.5; 2.5; 2.5 MG/1; MG/1; MG/1; MG/1
10 TABLET ORAL DAILY
Qty: 90 TABLET | Refills: 0 | Status: SHIPPED | OUTPATIENT
Start: 2022-06-06 | End: 2022-09-04

## 2022-05-31 RX ORDER — DEXTROAMPHETAMINE SACCHARATE, AMPHETAMINE ASPARTATE, DEXTROAMPHETAMINE SULFATE AND AMPHETAMINE SULFATE 2.5; 2.5; 2.5; 2.5 MG/1; MG/1; MG/1; MG/1
10 TABLET ORAL DAILY
Qty: 30 TABLET | Refills: 0 | Status: SHIPPED | OUTPATIENT
Start: 2022-06-06 | End: 2022-05-31

## 2022-05-31 NOTE — PROGRESS NOTES
TK is a 31 year old who is being evaluated via a billable video visit.        Video Start Time: 2:14 PM    Assessment & Plan     Attention deficit hyperactivity disorder (ADHD), predominantly inattentive type  - reviewed. Will provide 90 day supply bridge to provide pt time to find new PCP in [place].  - amphetamine-dextroamphetamine (ADDERALL XR) 30 MG 24 hr capsule  Dispense: 90 capsule; Refill: 0  - amphetamine-dextroamphetamine (ADDERALL) 10 MG tablet  Dispense: 90 tablet; Refill: 0    Moderate episode of recurrent major depressive disorder (H)  Generalized anxiety disorder  -Stable  - citalopram (CELEXA) 20 MG tablet  Dispense: 90 tablet; Refill: 0    Mild intermittent asthma without complication  -Discussed follow-up up with allergy/asthma specialist in [place]      Return for Pt moving to [place], will find new PCP..    Fredy Beltran [facility]    Subjective   RUTHY is a 31 year old who presents for the following health issues     HPI     Patient with history of asthma, ADHD, anxiety and depression.  Moving to [place] in July. Requesting 90 day supply of medications to give him time to set-up with new PCP in [place].    Takes Adderall XR 30 mg in am and Adderall 10 mg at noon. Works well for him. Would like 90 day supply. Understands that it may not be covered by insurance.     On citalopram for mood. Stable, would like refill.    Wonders about allergy shots and how to go about being evaluated for this.    Review of Systems         Objective         Vitals:  No vitals were obtained today due to virtual visit.    Physical Exam   GENERAL: Healthy, alert and no distress  EYES: Eyes grossly normal to inspection.  No discharge or erythema, or obvious scleral/conjunctival abnormalities.  RESP: No audible wheeze, cough, or visible cyanosis.  No visible retractions or increased work of breathing.    SKIN: Visible skin clear. No significant rash, abnormal pigmentation or lesions.  NEURO:  Cranial nerves grossly intact.  Mentation and speech appropriate for age.  PSYCH: Mentation appears normal, affect normal/bright, judgement and insight intact, normal speech and appearance well-groomed.          Video-Visit Details    Type of service:  Video Visit    Video End Time:2:13 PM    Originating Location (pt. Location): Home    Distant Location (provider location):  [facility]     Platform used for Video Visit: [employer]

## 2022-06-01 NOTE — TELEPHONE ENCOUNTER
Addressed in separate encounter for fill on 6/6/22.    GIOVANNI Peter RN  [facility]

## 2022-06-06 ENCOUNTER — MYC MEDICAL ADVICE (OUTPATIENT)
Dept: FAMILY MEDICINE | Facility: CLINIC | Age: 32
End: 2022-06-06
Payer: COMMERCIAL

## 2022-06-06 DIAGNOSIS — F90.0 ATTENTION DEFICIT HYPERACTIVITY DISORDER (ADHD), PREDOMINANTLY INATTENTIVE TYPE: ICD-10-CM

## 2022-06-06 RX ORDER — DEXTROAMPHETAMINE SACCHARATE, AMPHETAMINE ASPARTATE MONOHYDRATE, DEXTROAMPHETAMINE SULFATE AND AMPHETAMINE SULFATE 7.5; 7.5; 7.5; 7.5 MG/1; MG/1; MG/1; MG/1
30 CAPSULE, EXTENDED RELEASE ORAL DAILY
Qty: 90 CAPSULE | Refills: 0 | Status: SHIPPED | OUTPATIENT
Start: 2022-06-06 | End: 2022-06-08

## 2022-06-06 NOTE — TELEPHONE ENCOUNTER
Dr. Beltran- patient calling in about this, see Yipit message.    OK to resend to the pended pharmacy and we will call to cancel the previous rx sent to [facility]?     Sign and route back to us if ok    MARCIO RolandN RN  [facility]

## 2022-06-06 NOTE — TELEPHONE ENCOUNTER
Informed pt.     Called [facility], they will cancel rx.    Aretha Jon, MARCION RN  [facility]

## 2022-06-08 DIAGNOSIS — F90.0 ATTENTION DEFICIT HYPERACTIVITY DISORDER (ADHD), PREDOMINANTLY INATTENTIVE TYPE: ICD-10-CM

## 2022-06-08 RX ORDER — DEXTROAMPHETAMINE SACCHARATE, AMPHETAMINE ASPARTATE MONOHYDRATE, DEXTROAMPHETAMINE SULFATE AND AMPHETAMINE SULFATE 7.5; 7.5; 7.5; 7.5 MG/1; MG/1; MG/1; MG/1
30 CAPSULE, EXTENDED RELEASE ORAL DAILY
Qty: 90 CAPSULE | Refills: 0 | Status: SHIPPED | OUTPATIENT
Start: 2022-06-08

## 2022-06-08 NOTE — TELEPHONE ENCOUNTER
Rx signed for [facility]. Can RN please call [facility] to cancel prior script.    Thanks!

## 2022-06-08 NOTE — TELEPHONE ENCOUNTER
PT is having a terrible time filling the adderall 30 mg dose.  PT was able to fill the 10 mg dose at [facility].    [facility]  [facility]. Now they say that can't get the 30 mg dosing.  PT called [facility]. They say they can fill it.  Can this pended rx be sent to the [facility] pharmacy in [place]? Rx at [facility] may need to be cancelled.      Pt is apologetic for this extra work by clinic.  PT is out of meds as of today.      Sending to Dr. Beltran.  OK to leave a detailed message.  MARGARITA Chacon

## 2022-06-08 NOTE — TELEPHONE ENCOUNTER
Writer called patient and reviewed Adderall XR 30 mg was sent to [facility] in [place], [place].    Writer called [facility] and cancelled order for Adderall XR 30 mg.    MARCIO BarnesN, RN  [facility]

## 2022-09-03 ENCOUNTER — HEALTH MAINTENANCE LETTER (OUTPATIENT)
Age: 32
End: 2022-09-03

## 2022-10-11 NOTE — PATIENT INSTRUCTIONS
Advised to have apartment professionally cleaned to help get rid of the cat dander and hair   For information on Fall & Injury Prevention, visit: [email or web address] OR  [email or web address]/[email or web address]-tips-[email or web address]-injury OR  https://www.cdc.gov/steadi/patient.html

## 2023-04-23 ENCOUNTER — HEALTH MAINTENANCE LETTER (OUTPATIENT)
Age: 33
End: 2023-04-23

## 2024-04-27 ENCOUNTER — HEALTH MAINTENANCE LETTER (OUTPATIENT)
Age: 34
End: 2024-04-27

## 2025-05-11 ENCOUNTER — HEALTH MAINTENANCE LETTER (OUTPATIENT)
Age: 35
End: 2025-05-11
